# Patient Record
Sex: FEMALE | Race: BLACK OR AFRICAN AMERICAN | Employment: UNEMPLOYED | URBAN - METROPOLITAN AREA
[De-identification: names, ages, dates, MRNs, and addresses within clinical notes are randomized per-mention and may not be internally consistent; named-entity substitution may affect disease eponyms.]

---

## 2017-01-24 DIAGNOSIS — L30.9 DERMATITIS: ICD-10-CM

## 2017-01-24 DIAGNOSIS — E11.00 UNCONTROLLED TYPE 2 DIABETES MELLITUS WITH HYPEROSMOLARITY WITHOUT COMA, WITHOUT LONG-TERM CURRENT USE OF INSULIN (HCC): ICD-10-CM

## 2017-02-13 ENCOUNTER — OFFICE VISIT (OUTPATIENT)
Dept: ENDOCRINOLOGY | Age: 37
End: 2017-02-13

## 2017-02-13 VITALS
BODY MASS INDEX: 41.21 KG/M2 | TEMPERATURE: 97 F | SYSTOLIC BLOOD PRESSURE: 141 MMHG | WEIGHT: 241.4 LBS | RESPIRATION RATE: 16 BRPM | HEIGHT: 64 IN | HEART RATE: 74 BPM | DIASTOLIC BLOOD PRESSURE: 74 MMHG

## 2017-02-13 DIAGNOSIS — E66.01 MORBID OBESITY DUE TO EXCESS CALORIES (HCC): ICD-10-CM

## 2017-02-13 DIAGNOSIS — L68.0 HIRSUTISM: ICD-10-CM

## 2017-02-13 DIAGNOSIS — Z79.4 TYPE 2 DIABETES MELLITUS WITH HYPERGLYCEMIA, WITH LONG-TERM CURRENT USE OF INSULIN (HCC): Primary | ICD-10-CM

## 2017-02-13 DIAGNOSIS — E11.65 TYPE 2 DIABETES MELLITUS WITH HYPERGLYCEMIA, WITH LONG-TERM CURRENT USE OF INSULIN (HCC): Primary | ICD-10-CM

## 2017-02-13 DIAGNOSIS — I10 ESSENTIAL HYPERTENSION: Primary | ICD-10-CM

## 2017-02-13 RX ORDER — LABETALOL 100 MG/1
100 TABLET, FILM COATED ORAL 2 TIMES DAILY
Qty: 60 TAB | Refills: 5 | Status: SHIPPED | OUTPATIENT
Start: 2017-02-13 | End: 2017-05-16 | Stop reason: SDUPTHER

## 2017-02-13 NOTE — MR AVS SNAPSHOT
Visit Information Date & Time Provider Department Dept. Phone Encounter #  
 2/13/2017  1:15 PM Vaishali Patel MD [facility] [phone] [id number] Follow-up Instructions Return in about 3 months (around 5/13/2017). Upcoming Health Maintenance Date Due  
 FOOT EXAM Q1 5/25/1991 EYE EXAM RETINAL OR DILATED Q1 5/25/1991 Pneumococcal 19-64 Medium Risk (1 of 1 - PPSV23) 5/25/2000 DTaP/Tdap/Td series (1 - Tdap) 5/25/2002 PAP AKA CERVICAL CYTOLOGY 5/25/2002 INFLUENZA AGE 9 TO ADULT 8/1/2016 HEMOGLOBIN A1C Q6M 6/30/2017 MICROALBUMIN Q1 12/30/2017 LIPID PANEL Q1 12/30/2017 Allergies as of 2/13/2017  Review Complete On: 2/13/2017 By: Vaishali Patel MD  
 No Known Allergies Current Immunizations  Never Reviewed No immunizations on file. Not reviewed this visit You Were Diagnosed With   
  
 Codes Comments Type 2 diabetes mellitus with hyperglycemia, with long-term current use of insulin (HCC)    -  Primary ICD-10-CM: E11.65, Z79.4 ICD-9-CM: 250.00, 790.29, V58.67 Hirsutism     ICD-10-CM: L68.0 ICD-9-CM: 704.1 Vitals BP Pulse Temp Resp Height(growth percentile) Weight(growth percentile) 141/74 (BP 1 Location: Left arm, BP Patient Position: Sitting) 74 97 °F (36.1 °C) (Oral) 16 5' 4\" (1.626 m) 241 lb 6.4 oz (109.5 kg) LMP BMI OB Status Smoking Status 06/01/2016 41.44 kg/m2 Pregnant Former Smoker BMI and BSA Data Body Mass Index Body Surface Area  
 41.44 kg/m 2 2.22 m 2 Preferred Pharmacy Pharmacy Name Phone [facility] [address] Your Updated Medication List  
  
   
This list is accurate as of: 2/13/17  2:04 PM.  Always use your most recent med list.  
  
  
  
  
 aspirin delayed-release 81 mg tablet Take 81 mg by mouth daily. cholecalciferol 1,000 unit Cap Commonly known as:  VITAMIN D3  
 Take 1,000 Units by mouth three (3) times daily. CLARITIN 10 mg tablet Generic drug:  loratadine Take 10 mg by mouth. clotrimazole-betamethasone topical cream  
Commonly known as:  Rabia Bush Twice daily to hand  
  
 cyanocobalamin 500 mcg tablet Commonly known as:  VITAMIN B12 Take 500 mcg by mouth daily. glucose blood VI test strips strip Commonly known as:  ONETOUCH ULTRA TEST Test blood glucose 6 times daily Dx Code: O24.414  
  
 insulin detemir 100 unit/mL injection Commonly known as:  LEVEMIR  
20 units SC  at HS  
  
 insulin syringe-needle U-100 1 mL 31 gauge x 15/64\" Syrg Commonly known as:  BD INSULIN SYRINGE ULTRA-FINE  
1 Syringe by SubCUTAneous route nightly. Lancets Misc Commonly known as: One Touch Tara Sidney Test blood glucose 6 times daily Dx Code: O24.414  
  
 linagliptin 5 mg tablet Commonly known as:  Aurelia Stands Take 1 Tab by mouth daily. metFORMIN 1,000 mg tablet Commonly known as:  GLUCOPHAGE Take 1 Tab by mouth two (2) times daily (with meals). PRENATAL DHA+COMPLETE PRENATAL -300 mg-mcg-mg Cmpk Generic drug:  PNV no.24-iron-folic acid-dha Take  by mouth.  
  
 pyridoxine (vitamin B6) 100 mg tablet Commonly known as:  VITAMIN B-6 Take 100 mg by mouth daily. VITAMIN C PO Take 1,000 mg by mouth daily. Follow-up Instructions Return in about 3 months (around 5/13/2017). Introducing [facility]! [facility] introduces Codemedia patient portal. Now you can access parts of your medical record, email your doctor's office, and request medication refills online. 1. In your internet browser, go to [email or web address] 2. Click on the First Time User? Click Here link in the Sign In box. You will see the New Member Sign Up page. 3. Enter your Codemedia Access Code exactly as it appears below. You will not need to use this code after youve completed the sign-up process.  If you do not sign up before the expiration date, you must request a new code. · dVisit Access Code: [email or web address] Expires: 5/14/2017  2:02 PM 
 
4. Enter the last four digits of your Social Security Number (xxxx) and Date of Birth (mm/dd/yyyy) as indicated and click Submit. You will be taken to the next sign-up page. 5. Create a dVisit ID. This will be your dVisit login ID and cannot be changed, so think of one that is secure and easy to remember. 6. Create a dVisit password. You can change your password at any time. 7. Enter your Password Reset Question and Answer. This can be used at a later time if you forget your password. 8. Enter your e-mail address. You will receive e-mail notification when new information is available in [address] 9. Click Sign Up. You can now view and download portions of your medical record. 10. Click the Download Summary menu link to download a portable copy of your medical information. If you have questions, please visit the Frequently Asked Questions section of the dVisit website. Remember, dVisit is NOT to be used for urgent needs. For medical emergencies, dial 911. Now available from your iPhone and Android! Please provide this summary of care documentation to your next provider. If you have any questions after today's visit, please call [phone].

## 2017-02-13 NOTE — PROGRESS NOTES
[facility]               Jamey Rolle MD        [address] 78 [phone] Fax [phone]               Patient Information  Date:2/13/2017  Name : Vu Reaves 29 y.o.     YOB: 1980         Referred by: No primary care provider on file. Chief Complaint   Patient presents with    Diabetes     3 mo f/u       History of Present Illness: Vu Reaves is a 29 y.o. female here for fu of  Type 2 Diabetes Mellitus. She is off insulin   Doing well on oral DM meds  Denies missing   Checking glucose at home  Has not noticed significant hypoglycemia     Compliant with medications    No acute issues        She was diagnosed with diabetes in February 2016. She has a history of gestational diabetes with last  pregnancy requiring insulin. Wt Readings from Last 3 Encounters:   02/13/17 241 lb 6.4 oz (109.5 kg)   10/05/16 242 lb (109.8 kg)   08/23/16 244 lb (110.7 kg)       BP Readings from Last 3 Encounters:   02/13/17 141/74   10/05/16 141/84   08/23/16 139/89           Past Medical History   Diagnosis Date    Carpal tunnel syndrome     Diabetes ([facility] [address]      Current Outpatient Prescriptions   Medication Sig    linagliptin (TRADJENTA) 5 mg tablet Take 1 Tab by mouth daily.  metFORMIN (GLUCOPHAGE) 1,000 mg tablet Take 1 Tab by mouth two (2) times daily (with meals).  clotrimazole-betamethasone (LOTRISONE) topical cream Twice daily to hand    insulin syringe-needle U-100 (BD INSULIN SYRINGE ULTRA-FINE) 1 mL 31 gauge x 15/64\" syrg 1 Syringe by SubCUTAneous route nightly.  loratadine (CLARITIN) 10 mg tablet Take 10 mg by mouth.  PNV no.24-iron-folic acid-dha (PRENATAL DHA+COMPLETE PRENATAL) 30975-300 mg-mcg-mg cmpk Take  by mouth.     glucose blood VI test strips (ONETOUCH ULTRA TEST) strip Test blood glucose 6 times daily Dx Code: O24.414    Lancets (ONE TOUCH DELICA) misc Test blood glucose 6 times daily Dx Code: O24.414    cholecalciferol (VITAMIN D3) 1,000 unit cap Take 1,000 Units by mouth three (3) times daily.  pyridoxine, vitamin B6, (VITAMIN B-6) 100 mg tablet Take 100 mg by mouth daily.  cyanocobalamin (VITAMIN B12) 500 mcg tablet Take 500 mcg by mouth daily.  ASCORBATE CALCIUM (VITAMIN C PO) Take 1,000 mg by mouth daily.  insulin detemir (LEVEMIR) 100 unit/mL injection 20 units SC  at HS    aspirin delayed-release 81 mg tablet Take 81 mg by mouth daily. No current facility-administered medications for this visit. No Known Allergies      Review of Systems:  - Constitutional Symptoms: no fevers, no chills, no weight loss  - Eyes: no blurry vision no double vision  - Cardiovascular: no chest pain ,no palpitations  - Respiratory: no cough no shortness of breath  - Gastrointestinal: no dysphagia no  abdominal pain  - Psychiatric: no depression no  anxiety  - Endocrine: no heat or cold intolerance    Physical Examination:   Blood pressure 141/74, pulse 74, temperature 97 °F (36.1 °C), temperature source Oral, resp. rate 16, height 5' 4\" (1.626 m), weight 241 lb 6.4 oz (109.5 kg), last menstrual period 06/01/2016. Estimated body mass index is 41.44 kg/(m^2) as calculated from the following:    Height as of this encounter: 5' 4\" (1.626 m). -   Weight as of this encounter: 241 lb 6.4 oz (109.5 kg). General: pleasant, no distress,   HEENT: no pallor, no periorbital edema, EOMI  Neck: supple,   Cardiovascular: regular, normal rate, normal S1 and S2  Respiratory: clear to auscultation bilaterally  Neurological:alert and oriented  Psychiatric: normal mood and affect  - Skin: color, texture, turgor normal.       Data Reviewed:     [] Glucose records reviewed. [] See glucose records for details (to be scanned). [] A1C  [] Reviewed labs      Assessment/Plan:     1. Type 2 diabetes mellitus with hyperglycemia, with long-term current use of insulin ([address]    2. Hirsutism        1.  Type 2 Diabetes Mellitus with no nephropathy,neuropathy,retinopathy     Metformin   tradjenta   Off insulin   No pregnancy planned , pt to call if she plans to concieve  Discussed strict contraception     FLU annually ,Pneumovax ,aspirin daily,annual eye exam,microalbumin    2. Hirsutism - had oligomenorrhea , metformin has helped  LMP Jan 2017       4. Obesity:Body mass index is 41.44 kg/(m^2). Discussed about the importance of exercise and carbohydrate portion control. There are no Patient Instructions on file for this visit. Follow-up Disposition: Not on File    Thank you for allowing me to participate in the care of this patient.     Cristela Ambriz MD      Patient verbalized understanding

## 2017-02-13 NOTE — PROGRESS NOTES
Miguel Angel Davis is a 29 y.o. female here for   Chief Complaint   Patient presents with    Diabetes     3 mo f/u       Functional glucose monitor and record keeping system? - yes  Eye exam within last year? - yes  Foot exam within last year? - yes    Lab Results   Component Value Date/Time    Hemoglobin A1c 6.5 12/30/2016 01:51 PM    Hemoglobin A1c, External 12.6 02/03/2016       Wt Readings from Last 3 Encounters:   10/05/16 242 lb (109.8 kg)   08/23/16 244 lb (110.7 kg)   04/27/16 254 lb 6.4 oz (115.4 kg)     Temp Readings from Last 3 Encounters:   10/05/16 97.9 °F (36.6 °C) (Oral)   08/23/16 97.1 °F (36.2 °C) (Oral)   04/27/16 98.2 °F (36.8 °C) (Oral)     BP Readings from Last 3 Encounters:   10/05/16 141/84   08/23/16 139/89   04/27/16 130/81     Pulse Readings from Last 3 Encounters:   10/05/16 73   08/23/16 66   04/27/16 63

## 2017-02-13 NOTE — LETTER
NOTIFICATION RETURN TO WORK / SCHOOL 
 
2/13/2017 2:08 PM 
 
Ms. Abimbola Garcia [address] [address] To Whom It May Concern: 
 
Abimbola Garcia is currently under the care of [address] [address] [address]. She will return to work/school on: 2/14/2017. If there are questions or concerns please have the patient contact our office. Sincerely, Josi Richards MD

## 2017-05-09 ENCOUNTER — TELEPHONE (OUTPATIENT)
Dept: ENDOCRINOLOGY | Age: 37
End: 2017-05-09

## 2017-05-16 ENCOUNTER — OFFICE VISIT (OUTPATIENT)
Dept: ENDOCRINOLOGY | Age: 37
End: 2017-05-16

## 2017-05-16 VITALS
DIASTOLIC BLOOD PRESSURE: 64 MMHG | HEIGHT: 64 IN | TEMPERATURE: 97.4 F | BODY MASS INDEX: 41.21 KG/M2 | WEIGHT: 241.4 LBS | RESPIRATION RATE: 16 BRPM | HEART RATE: 76 BPM | SYSTOLIC BLOOD PRESSURE: 148 MMHG

## 2017-05-16 DIAGNOSIS — L68.0 HIRSUTISM: ICD-10-CM

## 2017-05-16 DIAGNOSIS — I10 ESSENTIAL HYPERTENSION: ICD-10-CM

## 2017-05-16 DIAGNOSIS — E11.65 TYPE 2 DIABETES MELLITUS WITH HYPERGLYCEMIA, WITHOUT LONG-TERM CURRENT USE OF INSULIN (HCC): Primary | ICD-10-CM

## 2017-05-16 RX ORDER — LABETALOL 100 MG/1
100 TABLET, FILM COATED ORAL 2 TIMES DAILY
Qty: 60 TAB | Refills: 5 | Status: SHIPPED | OUTPATIENT
Start: 2017-05-16 | End: 2017-09-27 | Stop reason: ALTCHOICE

## 2017-05-16 RX ORDER — BISMUTH SUBSALICYLATE 262 MG
1 TABLET,CHEWABLE ORAL DAILY
COMMUNITY
End: 2020-04-15

## 2017-05-16 NOTE — MR AVS SNAPSHOT
Visit Information Date & Time Provider Department Dept. Phone Encounter #  
 5/16/2017  9:00 AM Hannah Rajan MD [facility] [phone] [id number] Follow-up Instructions Return in about 4 months (around 9/16/2017). Upcoming Health Maintenance Date Due  
 FOOT EXAM Q1 5/25/1991 EYE EXAM RETINAL OR DILATED Q1 5/25/1991 Pneumococcal 19-64 Medium Risk (1 of 1 - PPSV23) 5/25/2000 DTaP/Tdap/Td series (1 - Tdap) 5/25/2002 PAP AKA CERVICAL CYTOLOGY 5/25/2002 INFLUENZA AGE 9 TO ADULT 8/1/2017 HEMOGLOBIN A1C Q6M 11/9/2017 MICROALBUMIN Q1 12/30/2017 LIPID PANEL Q1 12/30/2017 Allergies as of 5/16/2017  Review Complete On: 5/16/2017 By: Simi Huerta LPN No Known Allergies Current Immunizations  Never Reviewed No immunizations on file. Not reviewed this visit You Were Diagnosed With   
  
 Codes Comments Type 2 diabetes mellitus with hyperglycemia, without long-term current use of insulin (HCC)    -  Primary ICD-10-CM: E11.65 ICD-9-CM: 250.00, 790.29 Vitals BP Pulse Temp Resp Height(growth percentile) Weight(growth percentile) 148/64 (BP 1 Location: Right arm, BP Patient Position: Sitting) 76 97.4 °F (36.3 °C) (Oral) 16 5' 4\" (1.626 m) 241 lb 6.4 oz (109.5 kg) LMP BMI OB Status Smoking Status 06/01/2016 41.44 kg/m2 Pregnant Former Smoker Vitals History BMI and BSA Data Body Mass Index Body Surface Area  
 41.44 kg/m 2 2.22 m 2 Preferred Pharmacy Pharmacy Name Phone [facility], [address] [place] [address] Your Updated Medication List  
  
   
This list is accurate as of: 5/16/17  9:52 AM.  Always use your most recent med list.  
  
  
  
  
 cholecalciferol 1,000 unit Cap Commonly known as:  VITAMIN D3 Take 1,000 Units by mouth three (3) times daily. CLARITIN 10 mg tablet Generic drug:  loratadine Take 10 mg by mouth. clotrimazole-betamethasone topical cream  
Commonly known as:  Judye [place] Twice daily to hand  
  
 cyanocobalamin 500 mcg tablet Commonly known as:  VITAMIN B12 Take 500 mcg by mouth daily. glucose blood VI test strips strip Commonly known as:  ONETOUCH ULTRA TEST Test blood glucose 6 times daily Dx Code: O24.414  
  
 insulin syringe-needle U-100 1 mL 31 gauge x 15/64\" Syrg Commonly known as:  BD INSULIN SYRINGE ULTRA-FINE  
1 Syringe by SubCUTAneous route nightly. labetalol 100 mg tablet Commonly known as:  Scherry Francine Take 1 Tab by mouth two (2) times a day. Lancets Misc Commonly known as: One Touch Chrystine Cotta Test blood glucose 6 times daily Dx Code: O24.414  
  
 linagliptin 5 mg tablet Commonly known as:  Rebecca Forest Take 1 Tab by mouth daily. metFORMIN 1,000 mg tablet Commonly known as:  GLUCOPHAGE Take 1 Tab by mouth two (2) times daily (with meals). multivitamin tablet Commonly known as:  ONE A DAY Take 1 Tab by mouth daily. pyridoxine (vitamin B6) 100 mg tablet Commonly known as:  VITAMIN B-6 Take 100 mg by mouth daily. VITAMIN C PO Take 1,000 mg by mouth daily. Follow-up Instructions Return in about 4 months (around 9/16/2017). Introducing [facility]! Jessica Fern[facility] introduces Soapbox patient portal. Now you can access parts of your medical record, email your doctor's office, and request medication refills online. 1. In your internet browser, go to [email or web address] 2. Click on the First Time User? Click Here link in the Sign In box. You will see the New Member Sign Up page. 3. Enter your Soapbox Access Code exactly as it appears below. You will not need to use this code after youve completed the sign-up process. If you do not sign up before the expiration date, you must request a new code. · Soapbox Access Code: [email or web address] Expires: 8/14/2017  9:52 AM 
 
4. Enter the last four digits of your Social Security Number (xxxx) and Date of Birth (mm/dd/yyyy) as indicated and click Submit. You will be taken to the next sign-up page. 5. Create a Litigain ID. This will be your Litigain login ID and cannot be changed, so think of one that is secure and easy to remember. 6. Create a Litigain password. You can change your password at any time. 7. Enter your Password Reset Question and Answer. This can be used at a later time if you forget your password. 8. Enter your e-mail address. You will receive e-mail notification when new information is available in [address] 9. Click Sign Up. You can now view and download portions of your medical record. 10. Click the Download Summary menu link to download a portable copy of your medical information. If you have questions, please visit the Frequently Asked Questions section of the Litigain website. Remember, Litigain is NOT to be used for urgent needs. For medical emergencies, dial 911. Now available from your iPhone and Android! Please provide this summary of care documentation to your next provider. If you have any questions after today's visit, please call [phone].

## 2017-05-16 NOTE — PROGRESS NOTES
[facility]               Geraldo Colon MD        [address] 78 [phone] Fax [phone]               Patient Information  Date:5/16/2017  Name : Jaxson Tolliver 29 y.o.     YOB: 1980         Referred by: No primary care provider on file. Chief Complaint   Patient presents with    Diabetes       History of Present Illness: Jaxson Tolliver is a 29 y.o. female here for fu of  Type 2 Diabetes Mellitus. She is off insulin   Doing well on oral DM meds  Checking glucose at home  Has not noticed significant hypoglycemia     Compliant with medications    No acute issues                She was diagnosed with diabetes in February 2016. She has a history of gestational diabetes with last  pregnancy requiring insulin. Wt Readings from Last 3 Encounters:   05/16/17 241 lb 6.4 oz (109.5 kg)   02/13/17 241 lb 6.4 oz (109.5 kg)   10/05/16 242 lb (109.8 kg)       BP Readings from Last 3 Encounters:   05/16/17 148/64   02/13/17 141/74   10/05/16 141/84           Past Medical History:   Diagnosis Date    Carpal tunnel syndrome     Diabetes (HCC)      Current Outpatient Prescriptions   Medication Sig    multivitamin (ONE A DAY) tablet Take 1 Tab by mouth daily.  linagliptin (TRADJENTA) 5 mg tablet Take 1 Tab by mouth daily.  metFORMIN (GLUCOPHAGE) 1,000 mg tablet Take 1 Tab by mouth two (2) times daily (with meals).  clotrimazole-betamethasone (LOTRISONE) topical cream Twice daily to hand    insulin syringe-needle U-100 (BD INSULIN SYRINGE ULTRA-FINE) 1 mL 31 gauge x 15/64\" syrg 1 Syringe by SubCUTAneous route nightly.  loratadine (CLARITIN) 10 mg tablet Take 10 mg by mouth.     glucose blood VI test strips (ONETOUCH ULTRA TEST) strip Test blood glucose 6 times daily Dx Code: O24.414    Lancets (ONE TOUCH DELICA) misc Test blood glucose 6 times daily Dx Code: O24.414    cholecalciferol (VITAMIN D3) 1,000 unit cap Take 1,000 Units by mouth three (3) times daily.  pyridoxine, vitamin B6, (VITAMIN B-6) 100 mg tablet Take 100 mg by mouth daily.  cyanocobalamin (VITAMIN B12) 500 mcg tablet Take 500 mcg by mouth daily.  ASCORBATE CALCIUM (VITAMIN C PO) Take 1,000 mg by mouth daily.  labetalol (NORMODYNE) 100 mg tablet Take 1 Tab by mouth two (2) times a day.  insulin detemir (LEVEMIR) 100 unit/mL injection 20 units SC  at HS     No current facility-administered medications for this visit. No Known Allergies      Review of Systems:  - Constitutional Symptoms: no fevers, no chills, no weight loss  - Eyes: no blurry vision no double vision  - Cardiovascular: no chest pain ,no palpitations  - Respiratory: no cough no shortness of breath  - Gastrointestinal: no dysphagia no  abdominal pain  - Psychiatric: no depression no  anxiety  - Endocrine: no heat or cold intolerance    Physical Examination:   Blood pressure 148/64, pulse 76, temperature 97.4 °F (36.3 °C), temperature source Oral, resp. rate 16, height 5' 4\" (1.626 m), weight 241 lb 6.4 oz (109.5 kg), last menstrual period 06/01/2016. Estimated body mass index is 41.44 kg/(m^2) as calculated from the following:    Height as of this encounter: 5' 4\" (1.626 m). -   Weight as of this encounter: 241 lb 6.4 oz (109.5 kg). General: pleasant, no distress,   HEENT: no pallor, no periorbital edema, EOMI  Neck: supple,   Cardiovascular: regular, normal rate, normal S1 and S2  Respiratory: clear to auscultation bilaterally  Neurological:alert and oriented  Psychiatric: normal mood and affect  - Skin: color, texture, turgor normal.       Data Reviewed:     [] Glucose records reviewed. [] See glucose records for details (to be scanned). [] A1C  [] Reviewed labs      Assessment/Plan:     1. Type 2 diabetes mellitus with hyperglycemia, without long-term current use of insulin (HCC)        1.  Type 2 Diabetes Mellitus with no nephropathy,neuropathy,retinopathy  Lab Results Component Value Date/Time    Hemoglobin A1c 6.3 05/09/2017 10:05 AM    Hemoglobin A1c, External 12.6 02/03/2016        Metformin   tradjenta   Off insulin   No pregnancy planned , pt to call if she plans to concieve  Discussed strict contraception     FLU annually ,Pneumovax ,aspirin daily,annual eye exam,microalbumin    2. Hirsutism - had oligomenorrhea , metformin has helped _PCOS  LMP Jan 2017   Rule out excess cortisol    3 HTN - no lisinopril, restart labetolol    4. Obesity:Body mass index is 41.44 kg/(m^2). Discussed about the importance of exercise and carbohydrate portion control. There are no Patient Instructions on file for this visit. Follow-up Disposition: Not on File    Thank you for allowing me to participate in the care of this patient.     Joni Mckeon MD      Patient verbalized understanding

## 2017-05-16 NOTE — PROGRESS NOTES
Catalina Goff is a 29 y.o. female here for   Chief Complaint   Patient presents with    Diabetes       Functional glucose monitor and record keeping system? - no  Eye exam within last year? - yes Feb 2017  Foot exam within last year? - yes, last visit    Lab Results   Component Value Date/Time    Hemoglobin A1c 6.3 05/09/2017 10:05 AM    Hemoglobin A1c, External 12.6 02/03/2016       Wt Readings from Last 3 Encounters:   02/13/17 241 lb 6.4 oz (109.5 kg)   10/05/16 242 lb (109.8 kg)   08/23/16 244 lb (110.7 kg)     Temp Readings from Last 3 Encounters:   02/13/17 97 °F (36.1 °C) (Oral)   10/05/16 97.9 °F (36.6 °C) (Oral)   08/23/16 97.1 °F (36.2 °C) (Oral)     BP Readings from Last 3 Encounters:   02/13/17 141/74   10/05/16 141/84   08/23/16 139/89     Pulse Readings from Last 3 Encounters:   02/13/17 74   10/05/16 73   08/23/16 66

## 2017-06-19 ENCOUNTER — TELEPHONE (OUTPATIENT)
Dept: ENDOCRINOLOGY | Age: 37
End: 2017-06-19

## 2017-06-19 NOTE — TELEPHONE ENCOUNTER
Pt states she is currently pregnant and wants to know the dose of Levemir, if she needs to take per physician.

## 2017-06-19 NOTE — TELEPHONE ENCOUNTER
She has an appointment tomorrow    Based on the log insulin will be added and discussed during visit     Ask her to check sugars before breakfast and 2 hours after each meal     Stop Tradjenta     Continue Metformin

## 2017-06-20 ENCOUNTER — OFFICE VISIT (OUTPATIENT)
Dept: ENDOCRINOLOGY | Age: 37
End: 2017-06-20

## 2017-06-20 VITALS
SYSTOLIC BLOOD PRESSURE: 160 MMHG | OXYGEN SATURATION: 100 % | BODY MASS INDEX: 40.8 KG/M2 | TEMPERATURE: 98.2 F | DIASTOLIC BLOOD PRESSURE: 83 MMHG | HEIGHT: 64 IN | WEIGHT: 239 LBS | HEART RATE: 84 BPM | RESPIRATION RATE: 16 BRPM

## 2017-06-20 DIAGNOSIS — I10 ESSENTIAL HYPERTENSION: ICD-10-CM

## 2017-06-20 DIAGNOSIS — E66.01 MORBID OBESITY DUE TO EXCESS CALORIES (HCC): ICD-10-CM

## 2017-06-20 DIAGNOSIS — E11.65 TYPE 2 DIABETES MELLITUS WITH HYPERGLYCEMIA, UNSPECIFIED LONG TERM INSULIN USE STATUS: Primary | ICD-10-CM

## 2017-06-20 DIAGNOSIS — Z79.4 UNCONTROLLED TYPE 2 DIABETES MELLITUS WITH HYPERGLYCEMIA, WITH LONG-TERM CURRENT USE OF INSULIN (HCC): ICD-10-CM

## 2017-06-20 DIAGNOSIS — O24.414 INSULIN CONTROLLED GESTATIONAL DIABETES MELLITUS (GDM) IN FIRST TRIMESTER: ICD-10-CM

## 2017-06-20 DIAGNOSIS — E11.65 UNCONTROLLED TYPE 2 DIABETES MELLITUS WITH HYPERGLYCEMIA, WITH LONG-TERM CURRENT USE OF INSULIN (HCC): ICD-10-CM

## 2017-06-20 DIAGNOSIS — L68.0 HIRSUTISM: ICD-10-CM

## 2017-06-20 DIAGNOSIS — O24.019 PRE-EXISTING TYPE 1 DIABETES MELLITUS DURING PREGNANCY, ANTEPARTUM: ICD-10-CM

## 2017-06-20 RX ORDER — METHYLDOPA 250 MG/1
250 TABLET, FILM COATED ORAL 3 TIMES DAILY
Qty: 90 TAB | Refills: 2 | Status: SHIPPED | OUTPATIENT
Start: 2017-06-20 | End: 2017-06-30 | Stop reason: SDUPTHER

## 2017-06-20 RX ORDER — METFORMIN HYDROCHLORIDE 1000 MG/1
TABLET ORAL
Qty: 60 TAB | Refills: 6 | Status: SHIPPED | OUTPATIENT
Start: 2017-06-20 | End: 2018-01-15 | Stop reason: SDUPTHER

## 2017-06-20 RX ORDER — PEN NEEDLE, DIABETIC 31 GX3/16"
NEEDLE, DISPOSABLE MISCELLANEOUS
Qty: 100 PEN NEEDLE | Refills: 4 | Status: SHIPPED | OUTPATIENT
Start: 2017-06-20

## 2017-06-20 NOTE — PROGRESS NOTES
[facility]               Sandie Motta MD        [address] 78 [phone] Fax [phone]               Patient Information  Date:6/21/2017  Name : Rodrick Brink 40 y.o.     YOB: 1980         Referred by: No primary care provider on file. Chief Complaint   Patient presents with    Diabetes       History of Present Illness: Rodrick Brink is a 40 y.o. female here for fu of  Type 2 Diabetes Mellitus. She was off insulin, doing well on oral medications. Now she is 4 weeks pregnant. Asked her to stop Tradjenta, continue Metformin. She had Levemir which she had from last year, this was not refrigerated and she took 20 units this morning. She is checking blood glucose, fasting's are 120. No hypoglycemia. Checking glucose at home  Has not noticed significant hypoglycemia     Compliant with medications    No acute issues                She was diagnosed with diabetes in February 2016. She has a history of gestational diabetes with last  pregnancy requiring insulin. Wt Readings from Last 3 Encounters:   06/20/17 239 lb (108.4 kg)   05/16/17 241 lb 6.4 oz (109.5 kg)   02/13/17 241 lb 6.4 oz (109.5 kg)       BP Readings from Last 3 Encounters:   06/20/17 160/83   05/16/17 148/64   02/13/17 141/74           Past Medical History:   Diagnosis Date    Carpal tunnel syndrome     Diabetes (HCC)      Current Outpatient Prescriptions   Medication Sig    insulin NPH (HUMULIN N) 100 unit/mL (3 mL) inpn 25 units at bedtime SC    Insulin Needles, Disposable, (JOSE ALBERTO PEN NEEDLE) 32 gauge x 5/32\" ndle With insulin    methyldopa (ALDOMET) 250 mg tablet Take 1 Tab by mouth three (3) times daily.  multivitamin (ONE A DAY) tablet Take 1 Tab by mouth daily.  labetalol (NORMODYNE) 100 mg tablet Take 1 Tab by mouth two (2) times a day.     insulin syringe-needle U-100 (BD INSULIN SYRINGE ULTRA-FINE) 1 mL 31 gauge x 15/64\" syrg 1 Syringe by SubCUTAneous route nightly.  loratadine (CLARITIN) 10 mg tablet Take 10 mg by mouth.  glucose blood VI test strips (ONETOUCH ULTRA TEST) strip Test blood glucose 6 times daily Dx Code: O24.414    Lancets (ONE TOUCH DELICA) misc Test blood glucose 6 times daily Dx Code: O24.414    cholecalciferol (VITAMIN D3) 1,000 unit cap Take 1,000 Units by mouth three (3) times daily.  cyanocobalamin (VITAMIN B12) 500 mcg tablet Take 500 mcg by mouth daily.  metFORMIN (GLUCOPHAGE) 1,000 mg tablet TAKE ONE TABLET BY MOUTH TWICE DAILY WITH MEALS    clotrimazole-betamethasone (LOTRISONE) topical cream Twice daily to hand    pyridoxine, vitamin B6, (VITAMIN B-6) 100 mg tablet Take 100 mg by mouth daily.  ASCORBATE CALCIUM (VITAMIN C PO) Take 1,000 mg by mouth daily. No current facility-administered medications for this visit. No Known Allergies      Review of Systems:  - Constitutional Symptoms: no fevers, no chills, no weight loss  - Eyes: no blurry vision no double vision  - Cardiovascular: no chest pain ,no palpitations  - Respiratory: no cough no shortness of breath  - Gastrointestinal: no dysphagia no  abdominal pain  - Psychiatric: no depression no  anxiety  - Endocrine: no heat or cold intolerance    Physical Examination:   Blood pressure 160/83, pulse 84, temperature 98.2 °F (36.8 °C), temperature source Oral, resp. rate 16, height 5' 4\" (1.626 m), weight 239 lb (108.4 kg), last menstrual period 05/17/2016, SpO2 100 %. Estimated body mass index is 41.02 kg/(m^2) as calculated from the following:    Height as of this encounter: 5' 4\" (1.626 m). -   Weight as of this encounter: 239 lb (108.4 kg).   General: pleasant, no distress,   HEENT: no pallor, no periorbital edema, EOMI  Neck: supple,   Cardiovascular: regular, normal rate, normal S1 and S2  Respiratory: clear to auscultation bilaterally  Neurological:alert and oriented  Psychiatric: normal mood and affect  - Skin: color, texture, turgor normal.       Data Reviewed:     [] Glucose records reviewed. [] See glucose records for details (to be scanned). [] A1C  [] Reviewed labs      Assessment/Plan:     1. Type 2 diabetes mellitus with hyperglycemia, unspecified long term insulin use status    2. Pre-existing type 1 diabetes mellitus during pregnancy, antepartum    3. Essential hypertension    4. Morbid obesity due to excess calories ([address]        1. Type 2 Diabetes Mellitus with no nephropathy,neuropathy,retinopathy  Lab Results   Component Value Date/Time    Hemoglobin A1c 6.3 05/09/2017 10:05 AM    Hemoglobin A1c, External 12.6 02/03/2016     She is now pregnant, discussed the goals of blood glucose, complications of uncontrolled diabetes on pregnancy outcomes. Check blood glucose fasting and two hours after. NPH 25 units at bedtime. Continue Metformin. Has insulin resistance. She has stopped Tradjenta. Blood pressure is still high. She is on Labetalol, start Methyldopa. Follow up with OB. She has to see high risk OB.      FLU annually ,Pneumovax ,aspirin daily,annual eye exam,microalbumin    2. Hirsutism - had oligomenorrhea , metformin has helped _PCOS  LMP Jan 2017       3 HTN - no lisinopril, restart labetolol    4. Obesity:Body mass index is 41.02 kg/(m^2). Discussed about the importance of exercise and carbohydrate portion control. Need close monitoring       Patient Instructions   Goals for blood sugar:  - fasting: less than 90 - 95  - 1 hour after a meal : less than 130  - 2 hours after a meal: less than 120    Check blood sugars immediately before breakfast  , 2 hour after the meals     Levemir  insulin 25 units at bed time            Follow-up Disposition:  Return in about 1 week (around 6/27/2017). Thank you for allowing me to participate in the care of this patient.     Maged Blackman MD      Patient verbalized understanding

## 2017-06-20 NOTE — LETTER
NOTIFICATION RETURN TO WORK / SCHOOL 
 
6/20/2017 4:59 PM 
 
Ms. Suzanne Allen [address] [facility] [address] [address] [address] [address] To Whom It May Concern: 
 
Suzanne Allen is currently under the care of [address] [address]. She will return to work/school on: 6/20/17 If there are questions or concerns please have the patient contact our office. Sincerely, Sandra Hidalgo MD

## 2017-06-20 NOTE — PROGRESS NOTES
Eye exam: 2 months ago  Foot exam: within last year    Lab Results   Component Value Date/Time    Hemoglobin A1c 6.3 05/09/2017 10:05 AM    Hemoglobin A1c, External 12.6 02/03/2016     Wt Readings from Last 3 Encounters:   06/20/17 239 lb (108.4 kg)   05/16/17 241 lb 6.4 oz (109.5 kg)   02/13/17 241 lb 6.4 oz (109.5 kg)     Temp Readings from Last 3 Encounters:   06/20/17 98.2 °F (36.8 °C) (Oral)   05/16/17 97.4 °F (36.3 °C) (Oral)   02/13/17 97 °F (36.1 °C) (Oral)     BP Readings from Last 3 Encounters:   06/20/17 160/83   05/16/17 148/64   02/13/17 141/74     Pulse Readings from Last 3 Encounters:   06/20/17 84   05/16/17 76   02/13/17 74

## 2017-06-20 NOTE — MR AVS SNAPSHOT
Visit Information Date & Time Provider Department Dept. Phone Encounter #  
 6/20/2017  3:45 PM Chikis Castaneda MD [facility] [phone] [id number] Follow-up Instructions Return in about 1 week (around 6/27/2017). Your Appointments 9/18/2017  8:30 AM  
ROUTINE CARE with Chikis Castaneda MD  
[facility]) Appt Note: f/u 4 month  
 [address] [facility] [address]  
[phone]  
  
   
 [address] [place] [address] Upcoming Health Maintenance Date Due  
 FOOT EXAM Q1 5/25/1991 EYE EXAM RETINAL OR DILATED Q1 5/25/1991 Pneumococcal 19-64 Medium Risk (1 of 1 - PPSV23) 5/25/2000 DTaP/Tdap/Td series (1 - Tdap) 5/25/2002 PAP AKA CERVICAL CYTOLOGY 5/25/2002 INFLUENZA AGE 9 TO ADULT 8/1/2017 HEMOGLOBIN A1C Q6M 11/9/2017 MICROALBUMIN Q1 12/30/2017 LIPID PANEL Q1 12/30/2017 Allergies as of 6/20/2017  Review Complete On: 6/20/2017 By: Boneta Shock, LPN No Known Allergies Current Immunizations  Never Reviewed No immunizations on file. Not reviewed this visit Vitals BP Pulse Temp Resp Height(growth percentile) Weight(growth percentile) 160/83 (BP 1 Location: Left arm, BP Patient Position: Sitting) 84 98.2 °F (36.8 °C) (Oral) 16 5' 4\" (1.626 m) 239 lb (108.4 kg) LMP SpO2 BMI OB Status Smoking Status 05/17/2016 100% 41.02 kg/m2 Pregnant Former Smoker Vitals History BMI and BSA Data Body Mass Index Body Surface Area 41.02 kg/m 2 2.21 m 2 Preferred Pharmacy Pharmacy Name Phone [facility], [address] [place] [address] Your Updated Medication List  
  
   
This list is accurate as of: 6/20/17  4:52 PM.  Always use your most recent med list.  
  
  
  
  
 cholecalciferol 1,000 unit Cap Commonly known as:  VITAMIN D3  
 Take 1,000 Units by mouth three (3) times daily. CLARITIN 10 mg tablet Generic drug:  loratadine Take 10 mg by mouth. clotrimazole-betamethasone topical cream  
Commonly known as:  Duncan Ort Twice daily to hand  
  
 cyanocobalamin 500 mcg tablet Commonly known as:  VITAMIN B12 Take 500 mcg by mouth daily. glucose blood VI test strips strip Commonly known as:  ONETOUCH ULTRA TEST Test blood glucose 6 times daily Dx Code: O24.414  
  
 insulin syringe-needle U-100 1 mL 31 gauge x 15/64\" Syrg Commonly known as:  BD INSULIN SYRINGE ULTRA-FINE  
1 Syringe by SubCUTAneous route nightly. labetalol 100 mg tablet Commonly known as:  [place] Take 1 Tab by mouth two (2) times a day. Lancets Misc Commonly known as: One Touch [facility] Test blood glucose 6 times daily Dx Code: W27.070 LEVEMIR FLEXTOUCH 100 unit/mL (3 mL) Inpn Generic drug:  insulin detemir 20 Units by SubCUTAneous route daily. metFORMIN 1,000 mg tablet Commonly known as:  GLUCOPHAGE Take 1 Tab by mouth two (2) times daily (with meals). multivitamin tablet Commonly known as:  ONE A DAY Take 1 Tab by mouth daily. pyridoxine (vitamin B6) 100 mg tablet Commonly known as:  VITAMIN B-6 Take 100 mg by mouth daily. VITAMIN C PO Take 1,000 mg by mouth daily. Follow-up Instructions Return in about 1 week (around 6/27/2017). Patient Instructions Goals for blood sugar: 
- fasting: less than 90 - 95 
- 1 hour after a meal : less than 130 
- 2 hours after a meal: less than 120 Check blood sugars immediately before breakfast  , 2 hour after the meals Levemir  insulin 25 units at bed time Introducing [facility]! Valente Hernandez introduces [employer] patient portal. Now you can access parts of your medical record, email your doctor's office, and request medication refills online.    
 
1. In your internet browser, go to [email or web address] 2. Click on the First Time User? Click Here link in the Sign In box. You will see the New Member Sign Up page. 3. Enter your F[employer] Access Code exactly as it appears below. You will not need to use this code after youve completed the sign-up process. If you do not sign up before the expiration date, you must request a new code. · [employer] Access Code: [email or web address] Expires: 8/14/2017  9:52 AM 
 
4. Enter the last four digits of your Social Security Number (xxxx) and Date of Birth (mm/dd/yyyy) as indicated and click Submit. You will be taken to the next sign-up page. 5. Create a FolderBoy ID. This will be your F[employer]Boy login ID and cannot be changed, so think of one that is secure and easy to remember. 6. Create a [employer] password. You can change your password at any time. 7. Enter your Password Reset Question and Answer. This can be used at a later time if you forget your password. 8. Enter your e-mail address. You will receive e-mail notification when new information is available in [address] 9. Click Sign Up. You can now view and download portions of your medical record. 10. Click the Download Summary menu link to download a portable copy of your medical information. If you have questions, please visit the Frequently Asked Questions section of the [employer] website. Remember, [employer] is NOT to be used for urgent needs. For medical emergencies, dial 911. Now available from your iPhone and Android! Please provide this summary of care documentation to your next provider. If you have any questions after today's visit, please call [phone].

## 2017-06-21 RX ORDER — LANCETS
EACH MISCELLANEOUS
Qty: 200 EACH | Refills: 11 | Status: SHIPPED | OUTPATIENT
Start: 2017-06-21 | End: 2019-12-05 | Stop reason: SDUPTHER

## 2017-06-22 DIAGNOSIS — E11.65 TYPE 2 DIABETES MELLITUS WITH HYPERGLYCEMIA, WITH LONG-TERM CURRENT USE OF INSULIN (HCC): Primary | ICD-10-CM

## 2017-06-22 DIAGNOSIS — Z79.4 TYPE 2 DIABETES MELLITUS WITH HYPERGLYCEMIA, WITH LONG-TERM CURRENT USE OF INSULIN (HCC): Primary | ICD-10-CM

## 2017-06-22 NOTE — TELEPHONE ENCOUNTER
Informed pt that Dr Juanita Calderon is changing her insulin to Novolin N because Levemir and Humulin N are not covered. Asked pt to take 25 units at bedtime. Pt verbalized understanding.

## 2017-06-30 ENCOUNTER — OFFICE VISIT (OUTPATIENT)
Dept: ENDOCRINOLOGY | Age: 37
End: 2017-06-30

## 2017-06-30 VITALS
WEIGHT: 240 LBS | DIASTOLIC BLOOD PRESSURE: 69 MMHG | SYSTOLIC BLOOD PRESSURE: 150 MMHG | RESPIRATION RATE: 16 BRPM | HEART RATE: 70 BPM | TEMPERATURE: 98.3 F | OXYGEN SATURATION: 97 % | HEIGHT: 64 IN | BODY MASS INDEX: 40.97 KG/M2

## 2017-06-30 DIAGNOSIS — Z79.4 TYPE 2 DIABETES MELLITUS WITH HYPERGLYCEMIA, WITH LONG-TERM CURRENT USE OF INSULIN (HCC): Primary | ICD-10-CM

## 2017-06-30 DIAGNOSIS — E11.65 TYPE 2 DIABETES MELLITUS WITH HYPERGLYCEMIA, WITH LONG-TERM CURRENT USE OF INSULIN (HCC): Primary | ICD-10-CM

## 2017-06-30 DIAGNOSIS — O24.311 PRE-EXISTING DIABETES MELLITUS DURING PREGNANCY IN FIRST TRIMESTER: ICD-10-CM

## 2017-06-30 DIAGNOSIS — O24.311 PRE-EXISTING DIABETES MELLITUS IN PREGNANCY IN FIRST TRIMESTER: ICD-10-CM

## 2017-06-30 DIAGNOSIS — I10 ESSENTIAL HYPERTENSION: ICD-10-CM

## 2017-06-30 RX ORDER — INSULIN LISPRO 100 [IU]/ML
INJECTION, SOLUTION INTRAVENOUS; SUBCUTANEOUS
Qty: 30 ML | Refills: 5 | Status: SHIPPED | OUTPATIENT
Start: 2017-06-30 | End: 2017-06-30 | Stop reason: ALTCHOICE

## 2017-06-30 RX ORDER — INSULIN ASPART 100 [IU]/ML
INJECTION, SOLUTION INTRAVENOUS; SUBCUTANEOUS
Qty: 30 ML | Refills: 5 | Status: SHIPPED | OUTPATIENT
Start: 2017-06-30 | End: 2017-08-30 | Stop reason: SDUPTHER

## 2017-06-30 RX ORDER — METHYLDOPA 500 MG/1
500 TABLET, FILM COATED ORAL 3 TIMES DAILY
Qty: 90 TAB | Refills: 2 | Status: SHIPPED | OUTPATIENT
Start: 2017-06-30 | End: 2017-08-30 | Stop reason: SDUPTHER

## 2017-06-30 NOTE — PROGRESS NOTES
[facility]               Nick Smith MD        [address] [phone] Fax [phone]               Patient Information  Date:6/30/2017  Name : Perla Swan 40 y.o.     YOB: 1980           Chief Complaint   Patient presents with    Diabetes       History of Present Illness: Perla Swan is a 40 y.o. female here for fu of  Type 2 Diabetes Mellitus. She is 8 weeks pregnant   She is on NPH at night   fastings have improved and post prandial is high depending on what she eats      Checking glucose at home  Has not noticed significant hypoglycemia       She was diagnosed with diabetes in February 2016. She has a history of gestational diabetes with last  pregnancy requiring insulin. Wt Readings from Last 3 Encounters:   06/30/17 240 lb (108.9 kg)   06/20/17 239 lb (108.4 kg)   05/16/17 241 lb 6.4 oz (109.5 kg)       BP Readings from Last 3 Encounters:   06/30/17 150/69   06/20/17 160/83   05/16/17 148/64           Past Medical History:   Diagnosis Date    Carpal tunnel syndrome     Diabetes ([facility]      Current Outpatient Prescriptions   Medication Sig    methyldopa (ALDOMET) 500 mg tablet Take 1 Tab by mouth three (3) times daily.  insulin NPH (NOVOLIN N) 100 unit/mL injection Inject 25 units q HS Stop Humulin N & Levemir    glucose blood VI test strips (ONETOUCH ULTRA TEST) strip Test blood glucose 6 times daily Dx Code: O24.414    Lancets misc Test blood glucose 6 times daily Dx Code: O24.414    metFORMIN (GLUCOPHAGE) 1,000 mg tablet TAKE ONE TABLET BY MOUTH TWICE DAILY WITH MEALS    Insulin Needles, Disposable, (JOSE ALBERTO PEN NEEDLE) 32 gauge x 5/32\" ndle With insulin    multivitamin (ONE A DAY) tablet Take 1 Tab by mouth daily.  labetalol (NORMODYNE) 100 mg tablet Take 1 Tab by mouth two (2) times a day.     clotrimazole-betamethasone (LOTRISONE) topical cream Twice daily to hand    insulin syringe-needle U-100 (BD INSULIN SYRINGE ULTRA-FINE) 1 mL 31 gauge x 15/64\" syrg 1 Syringe by SubCUTAneous route nightly.  loratadine (CLARITIN) 10 mg tablet Take 10 mg by mouth.  cholecalciferol (VITAMIN D3) 1,000 unit cap Take 1,000 Units by mouth three (3) times daily.  pyridoxine, vitamin B6, (VITAMIN B-6) 100 mg tablet Take 100 mg by mouth daily.  cyanocobalamin (VITAMIN B12) 500 mcg tablet Take 500 mcg by mouth daily.  ASCORBATE CALCIUM (VITAMIN C PO) Take 1,000 mg by mouth daily.  insulin lispro (HUMALOG KWIKPEN) 100 unit/mL kwikpen Inject 8 units before each meal with SSI. Max units daily: 51     No current facility-administered medications for this visit. No Known Allergies      Review of Systems:  - Constitutional Symptoms: no fevers, no chills, no weight loss  - Eyes: no blurry vision no double vision  - Cardiovascular: no chest pain ,no palpitations  - Respiratory: no cough no shortness of breath  - Gastrointestinal: no dysphagia no  abdominal pain  - Psychiatric: no depression no  anxiety  - Endocrine: no heat or cold intolerance    Physical Examination:   Blood pressure 150/69, pulse 70, temperature 98.3 °F (36.8 °C), temperature source Oral, resp. rate 16, height 5' 4\" (1.626 m), weight 240 lb (108.9 kg), last menstrual period 05/17/2016, SpO2 97 %. Estimated body mass index is 41.2 kg/(m^2) as calculated from the following:    Height as of this encounter: 5' 4\" (1.626 m). -   Weight as of this encounter: 240 lb (108.9 kg). General: pleasant, no distress,   HEENT: no pallor, no periorbital edema, EOMI  Neck: supple,   Cardiovascular: regular, normal rate, normal S1 and S2  Respiratory: clear to auscultation bilaterally  Neurological:alert and oriented  Psychiatric: normal mood and affect  - Skin: color, texture, turgor normal.       Data Reviewed:     [] Glucose records reviewed. [] See glucose records for details (to be scanned). [] A1C  [] Reviewed labs      Assessment/Plan:     1.  Type 2 diabetes mellitus with hyperglycemia, with long-term current use of insulin ([address]    2. Essential hypertension    3. Pre-existing diabetes mellitus during pregnancy in first trimester        1. Type 2 Diabetes Mellitus with no nephropathy,neuropathy,retinopathy  Lab Results   Component Value Date/Time    Hemoglobin A1c 6.3 05/09/2017 10:05 AM    Hemoglobin A1c, External 12.6 02/03/2016     Discussed the goals of blood glucose, complications of uncontrolled diabetes on pregnancy outcomes. Check blood glucose fasting and two hours after. NPH 25 units at bedtime. Continue Metformin. Has insulin resistance. Humalog before meals     Blood pressure is still high. She is on Labetalol, increased  Methyldopa. Follow up with OB. She has to see high risk OB.      FLU annually ,Pneumovax ,aspirin daily,annual eye exam,microalbumin    2. Hirsutism - had oligomenorrhea , metformin has helped _PCOS  LMP Jan 2017       3 HTN -    4. Obesity:Body mass index is 41.2 kg/(m^2). Discussed about the importance of exercise and carbohydrate portion control. Need close monitoring       Patient Instructions   Goals for blood sugar:  - fasting: less than 90 - 95  - 1 hour after a meal : less than 130  - 2 hours after a meal: less than 120    Check blood sugars immediately before breakfast  , 2 hour after the meals     Novolin N  ( cloudy )  insulin 28 units at bed time    Humalog 8 units before each meal       Additional Humalog for high sugars    Blood sugar  Fast acting insulin Breakfast/Lunch/Dinner        120-150  Add 3 units       150-200  Add 6 Units       201-250  Add 9 Units                   Follow-up Disposition:  Return in about 4 weeks (around 7/28/2017). Thank you for allowing me to participate in the care of this patient.     Zac Neville MD      Patient verbalized understanding

## 2017-06-30 NOTE — MR AVS SNAPSHOT
Visit Information Date & Time Provider Department Dept. Phone Encounter #  
 6/30/2017  9:15 AM Hannah Rajan MD [facility] [phone] [id number] Follow-up Instructions Return in about 4 weeks (around 7/28/2017). Your Appointments 9/18/2017  8:30 AM  
ROUTINE CARE with Hannah Rajan MD  
[facility] Appt Note: f/u 4 month  
 [address] [facility] [address] [place] [address] [facility] [address]  
[phone]  
  
   
 [address] [place] [address] [address] [facility] [address] Upcoming Health Maintenance Date Due  
 FOOT EXAM Q1 5/25/1991 EYE EXAM RETINAL OR DILATED Q1 5/25/1991 Pneumococcal 19-64 Medium Risk (1 of 1 - PPSV23) 5/25/2000 DTaP/Tdap/Td series (1 - Tdap) 5/25/2002 PAP AKA CERVICAL CYTOLOGY 5/25/2002 INFLUENZA AGE 9 TO ADULT 8/1/2017 HEMOGLOBIN A1C Q6M 11/9/2017 MICROALBUMIN Q1 12/30/2017 LIPID PANEL Q1 12/30/2017 Allergies as of 6/30/2017  Review Complete On: 6/30/2017 By: Hannah Rajan MD  
 No Known Allergies Current Immunizations  Never Reviewed No immunizations on file. Not reviewed this visit You Were Diagnosed With   
  
 Codes Comments Type 2 diabetes mellitus with hyperglycemia, with long-term current use of insulin ([facility])    -  Primary ICD-10-CM: E11.65, Z79.4 ICD-9-CM: 250.00, 790.29, V58.67 Type 2 diabetes mellitus with hyperglycemia, unspecified long term insulin use status     ICD-10-CM: E11.65 ICD-9-CM: 250.00 Pre-existing type 1 diabetes mellitus during pregnancy, antepartum     ICD-10-CM: O24.019 
ICD-9-CM: 648.03, 250.01 Essential hypertension     ICD-10-CM: I10 
ICD-9-CM: 401.9 Vitals BP Pulse Temp Resp Height(growth percentile) Weight(growth percentile) 150/69 (BP 1 Location: Right arm, BP Patient Position: Sitting) 70 98.3 °F (36.8 °C) (Oral) 16 5' 4\" (1.626 m) 240 lb (108.9 kg) LMP SpO2 BMI OB Status Smoking Status 05/17/2016 97% 41.2 kg/m2 Pregnant Former Smoker Vitals History BMI and BSA Data Body Mass Index Body Surface Area  
 41.2 kg/m 2 2.22 m 2 Preferred Pharmacy Pharmacy Name Phone [facility] Geeta Boyle Your Updated Medication List  
  
   
This list is accurate as of: 6/30/17 10:00 AM.  Always use your most recent med list.  
  
  
  
  
 cholecalciferol 1,000 unit Cap Commonly known as:  VITAMIN D3 Take 1,000 Units by mouth three (3) times daily. CLARITIN 10 mg tablet Generic drug:  loratadine Take 10 mg by mouth. clotrimazole-betamethasone topical cream  
Commonly known as:  Brinad Fraction Twice daily to hand  
  
 cyanocobalamin 500 mcg tablet Commonly known as:  VITAMIN B12 Take 500 mcg by mouth daily. glucose blood VI test strips strip Commonly known as:  ONETOUCH ULTRA TEST Test blood glucose 6 times daily Dx Code: H56.843 Insulin Needles (Disposable) 32 gauge x 5/32\" Ndle Commonly known as:  Andree Pen Needle With insulin  
  
 insulin  unit/mL injection Commonly known as:  NovoLIN N Inject 25 units q HS Stop Humulin N & Levemir  
  
 insulin syringe-needle U-100 1 mL 31 gauge x 15/64\" Syrg Commonly known as:  BD INSULIN SYRINGE ULTRA-FINE  
1 Syringe by SubCUTAneous route nightly. labetalol 100 mg tablet Commonly known as:  Lubertha Ramsay Take 1 Tab by mouth two (2) times a day. Lancets Misc Test blood glucose 6 times daily Dx Code: O24.414  
  
 metFORMIN 1,000 mg tablet Commonly known as:  GLUCOPHAGE  
TAKE ONE TABLET BY MOUTH TWICE DAILY WITH MEALS  
  
 methyldopa 500 mg tablet Commonly known as:  ALDOMET Take 1 Tab by mouth three (3) times daily. multivitamin tablet Commonly known as:  ONE A DAY Take 1 Tab by mouth daily. pyridoxine (vitamin B6) 100 mg tablet Commonly known as:  VITAMIN B-6 Take 100 mg by mouth daily. VITAMIN C PO Take 1,000 mg by mouth daily. Prescriptions Sent to Pharmacy Refills  
 methyldopa (ALDOMET) 500 mg tablet 2 Sig: Take 1 Tab by mouth three (3) times daily. Class: Normal  
 Pharmacy: [facility] #: [phone] Route: Oral  
  
Follow-up Instructions Return in about 4 weeks (around 7/28/2017). Patient Instructions Goals for blood sugar: 
- fasting: less than 90 - 95 
- 1 hour after a meal : less than 130 
- 2 hours after a meal: less than 120 Check blood sugars immediately before breakfast  , 2 hour after the meals Novolin N  ( cloudy )  insulin 28 units at bed time Humalog 8 units before each meal  
 
 
Additional Humalog for high sugars Blood sugar  Fast acting insulin Breakfast/Lunch/Dinner 120-150  Add 3 units 150-200  Add 6 Units 201-250  Add 9 Units Introducing [facility] [place] & HEALTH SERVICES! [facility] introduces [employer] patient portal. Now you can access parts of your medical record, email your doctor's office, and request medication refills online. 1. In your internet browser, go to [email or web address] 2. Click on the First Time User? Click Here link in the Sign In box. You will see the New Member Sign Up page. 3. Enter your [employer] Access Code exactly as it appears below. You will not need to use this code after youve completed the sign-up process. If you do not sign up before the expiration date, you must request a new code. · [employer] Access Code: [email or web address] Expires: 8/14/2017  9:52 AM 
 
4. Enter the last four digits of your Social Security Number (xxxx) and Date of Birth (mm/dd/yyyy) as indicated and click Submit. You will be taken to the next sign-up page. 5. Create a [employer] ID.  This will be your [employer] login ID and cannot be changed, so think of one that is secure and easy to remember. 6. Create a [employer] password. You can change your password at any time. 7. Enter your Password Reset Question and Answer. This can be used at a later time if you forget your password. 8. Enter your e-mail address. You will receive e-mail notification when new information is available in [address] 9. Click Sign Up. You can now view and download portions of your medical record. 10. Click the Download Summary menu link to download a portable copy of your medical information. If you have questions, please visit the Frequently Asked Questions section of the [employer] website. Remember, [employer] is NOT to be used for urgent needs. For medical emergencies, dial 911. Now available from your iPhone and Android! Please provide this summary of care documentation to your next provider. Your primary care clinician is listed as Carlos Stringer. If you have any questions after today's visit, please call [phone].

## 2017-06-30 NOTE — PROGRESS NOTES
Lab Results   Component Value Date/Time    Hemoglobin A1c 6.3 05/09/2017 10:05 AM    Hemoglobin A1c, External 12.6 02/03/2016     Wt Readings from Last 3 Encounters:   06/30/17 240 lb (108.9 kg)   06/20/17 239 lb (108.4 kg)   05/16/17 241 lb 6.4 oz (109.5 kg)     Temp Readings from Last 3 Encounters:   06/30/17 98.3 °F (36.8 °C) (Oral)   06/20/17 98.2 °F (36.8 °C) (Oral)   05/16/17 97.4 °F (36.3 °C) (Oral)     BP Readings from Last 3 Encounters:   06/30/17 150/69   06/20/17 160/83   05/16/17 148/64     Pulse Readings from Last 3 Encounters:   06/30/17 70   06/20/17 84   05/16/17 76

## 2017-06-30 NOTE — PATIENT INSTRUCTIONS
Goals for blood sugar:  - fasting: less than 90 - 95  - 1 hour after a meal : less than 130  - 2 hours after a meal: less than 120    Check blood sugars immediately before breakfast  , 2 hour after the meals     Novolin N  ( cloudy )  insulin 28 units at bed time    Humalog 8 units before each meal       Additional Humalog for high sugars    Blood sugar  Fast acting insulin Breakfast/Lunch/Dinner        120-150  Add 3 units       150-200  Add 6 Units       201-250  Add 9 Units

## 2017-07-28 ENCOUNTER — OFFICE VISIT (OUTPATIENT)
Dept: ENDOCRINOLOGY | Age: 37
End: 2017-07-28

## 2017-07-28 VITALS
OXYGEN SATURATION: 100 % | BODY MASS INDEX: 38.36 KG/M2 | DIASTOLIC BLOOD PRESSURE: 70 MMHG | HEIGHT: 64 IN | TEMPERATURE: 97.7 F | HEART RATE: 66 BPM | RESPIRATION RATE: 16 BRPM | WEIGHT: 224.7 LBS | SYSTOLIC BLOOD PRESSURE: 135 MMHG

## 2017-07-28 DIAGNOSIS — I10 ESSENTIAL HYPERTENSION: ICD-10-CM

## 2017-07-28 DIAGNOSIS — O24.311 PRE-EXISTING DIABETES MELLITUS IN PREGNANCY IN FIRST TRIMESTER: ICD-10-CM

## 2017-07-28 DIAGNOSIS — O24.311 PRE-EXISTING DIABETES MELLITUS DURING PREGNANCY IN FIRST TRIMESTER: Primary | ICD-10-CM

## 2017-07-28 DIAGNOSIS — Z79.4 TYPE 2 DIABETES MELLITUS WITH HYPERGLYCEMIA, WITH LONG-TERM CURRENT USE OF INSULIN (HCC): Primary | ICD-10-CM

## 2017-07-28 DIAGNOSIS — E11.65 TYPE 2 DIABETES MELLITUS WITH HYPERGLYCEMIA, WITH LONG-TERM CURRENT USE OF INSULIN (HCC): Primary | ICD-10-CM

## 2017-07-28 DIAGNOSIS — Z79.4 TYPE 2 DIABETES MELLITUS WITH HYPERGLYCEMIA, WITH LONG-TERM CURRENT USE OF INSULIN (HCC): ICD-10-CM

## 2017-07-28 DIAGNOSIS — E11.65 TYPE 2 DIABETES MELLITUS WITH HYPERGLYCEMIA, WITH LONG-TERM CURRENT USE OF INSULIN (HCC): ICD-10-CM

## 2017-07-28 RX ORDER — SYRINGE AND NEEDLE,INSULIN,1ML 31GX15/64"
1 SYRINGE, EMPTY DISPOSABLE MISCELLANEOUS
Qty: 50 PEN NEEDLE | Refills: 11 | Status: SHIPPED | OUTPATIENT
Start: 2017-07-28 | End: 2017-08-16 | Stop reason: SDUPTHER

## 2017-07-28 NOTE — PROGRESS NOTES
[facility]               Ruthie Flores MD        [address] 78 [phone] Fax [phone]               Patient Information  Date:7/29/2017  Name : Paula Strong 40 y.o.     YOB: 1980           Chief Complaint   Patient presents with    Diabetes       History of Present Illness: Paula Strong is a 40 y.o. female here for fu of  Type 2 Diabetes Mellitus. She is on NPH at night   fastings have improved and post prandial is high depending on what she eats      Checking glucose at home  Has not noticed significant hypoglycemia       She was diagnosed with diabetes in February 2016. She has a history of gestational diabetes with last  pregnancy requiring insulin. Wt Readings from Last 3 Encounters:   07/28/17 224 lb 11.2 oz (101.9 kg)   06/30/17 240 lb (108.9 kg)   06/20/17 239 lb (108.4 kg)       BP Readings from Last 3 Encounters:   07/28/17 135/70   06/30/17 150/69   06/20/17 160/83           Past Medical History:   Diagnosis Date    Carpal tunnel syndrome     Diabetes (HCC)      Current Outpatient Prescriptions   Medication Sig    methyldopa (ALDOMET) 500 mg tablet Take 1 Tab by mouth three (3) times daily.  insulin aspart (NOVOLOG FLEXPEN) 100 unit/mL inpn Inject 8 units before each meal with SSI. Max units daily: 50 , stop Humalog    insulin NPH (NOVOLIN N) 100 unit/mL injection Inject 25 units q HS Stop Humulin N & Levemir    glucose blood VI test strips (ONETOUCH ULTRA TEST) strip Test blood glucose 6 times daily Dx Code: O24.414    Lancets misc Test blood glucose 6 times daily Dx Code: O24.414    metFORMIN (GLUCOPHAGE) 1,000 mg tablet TAKE ONE TABLET BY MOUTH TWICE DAILY WITH MEALS    Insulin Needles, Disposable, (JOSE ALBERTO PEN NEEDLE) 32 gauge x 5/32\" ndle With insulin    multivitamin (ONE A DAY) tablet Take 1 Tab by mouth daily.  labetalol (NORMODYNE) 100 mg tablet Take 1 Tab by mouth two (2) times a day.  clotrimazole-betamethasone (LOTRISONE) topical cream Twice daily to hand    loratadine (CLARITIN) 10 mg tablet Take 10 mg by mouth.  cholecalciferol (VITAMIN D3) 1,000 unit cap Take 1,000 Units by mouth three (3) times daily.  pyridoxine, vitamin B6, (VITAMIN B-6) 100 mg tablet Take 100 mg by mouth daily.  cyanocobalamin (VITAMIN B12) 500 mcg tablet Take 500 mcg by mouth daily.  ASCORBATE CALCIUM (VITAMIN C PO) Take 1,000 mg by mouth daily.  insulin syringe-needle U-100 (BD INSULIN SYRINGE ULTRA-FINE) 1 mL 31 gauge x 15/64\" syrg 1 Syringe by SubCUTAneous route nightly. DX code O24.311     No current facility-administered medications for this visit. No Known Allergies      Review of Systems:  - Constitutional Symptoms: no fevers, no chills, no weight loss  - Eyes: no blurry vision no double vision  - Cardiovascular: no chest pain ,no palpitations  - Respiratory: no cough no shortness of breath  - Gastrointestinal: no dysphagia no  abdominal pain  - Psychiatric: no depression no  anxiety  - Endocrine: no heat or cold intolerance    Physical Examination:   Blood pressure 135/70, pulse 66, temperature 97.7 °F (36.5 °C), temperature source Oral, resp. rate 16, height 5' 4\" (1.626 m), weight 224 lb 11.2 oz (101.9 kg), last menstrual period 05/17/2016, SpO2 100 %. Estimated body mass index is 38.57 kg/(m^2) as calculated from the following:    Height as of this encounter: 5' 4\" (1.626 m). -   Weight as of this encounter: 224 lb 11.2 oz (101.9 kg). General: pleasant, no distress,   HEENT: no pallor, no periorbital edema, EOMI  Neck: supple,   Cardiovascular: regular, normal rate, normal S1 and S2  Respiratory: clear to auscultation bilaterally  Neurological:alert and oriented  Psychiatric: normal mood and affect  - Skin: color, texture, turgor normal.       Data Reviewed:     [] Glucose records reviewed. [] See glucose records for details (to be scanned).   [] A1C  [] Reviewed labs      Assessment/Plan:     1. Pre-existing diabetes mellitus during pregnancy in first trimester    2. Type 2 diabetes mellitus with hyperglycemia, with long-term current use of insulin (HCC)    3. Essential hypertension        1. Type 2 Diabetes Mellitus with no nephropathy,neuropathy,retinopathy  Lab Results   Component Value Date/Time    Hemoglobin A1c 6.3 05/09/2017 10:05 AM    Hemoglobin A1c, External 12.6 02/03/2016       NPH 25 units at bedtime. Continue Metformin. Has insulin resistance. Humalog before meals       FLU annually ,Pneumovax ,aspirin daily,annual eye exam,microalbumin    2. Hirsutism - had oligomenorrhea , metformin has helped _PCOS  LMP Jan 2017       3 HTN -    4. Obesity:Body mass index is 38.57 kg/(m^2). Discussed about the importance of exercise and carbohydrate portion control. Patient Instructions   Goals for blood sugar:  - fasting: less than 90 - 95  - 1 hour after a meal : less than 130  - 2 hours after a meal: less than 120    Check blood sugars immediately before breakfast  , 2 hour after the meals     Novolin N  ( cloudy )  insulin 28 units at bed time    Humalog 6 units before each meal       Additional Humalog for high sugars    Blood sugar  insulin        120-150  Add 3 units       150-200  Add 6 Units       201-250  Add 9 Units                   Follow-up Disposition:  Return in about 4 weeks (around 8/25/2017). Thank you for allowing me to participate in the care of this patient.     Star Denney MD      Patient verbalized understanding

## 2017-07-28 NOTE — PROGRESS NOTES
Joi Spence is a 40 y.o. female here for   Chief Complaint   Patient presents with    Diabetes    A1C     Functional glucose monitor and record keeping system? - yes  Eye exam within last year? - yes  Foot exam within last year? - none    1. Have you been to the ER, urgent care clinic since your last visit? Hospitalized since your last visit? - no    2. Have you seen or consulted any other health care providers outside of the [address] [place] [address] since your last visit?   Include any pap smears or colon screening.- Dr Hale Brunner [facility]W on July 15, 2017 Prenatal visit      Lab Results   Component Value Date/Time    Hemoglobin A1c 6.3 05/09/2017 10:05 AM    Hemoglobin A1c, External 12.6 02/03/2016       Wt Readings from Last 3 Encounters:   07/28/17 224 lb 11.2 oz (101.9 kg)   06/30/17 240 lb (108.9 kg)   06/20/17 239 lb (108.4 kg)     Temp Readings from Last 3 Encounters:   07/28/17 97.7 °F (36.5 °C) (Oral)   06/30/17 98.3 °F (36.8 °C) (Oral)   06/20/17 98.2 °F (36.8 °C) (Oral)     BP Readings from Last 3 Encounters:   07/28/17 135/70   06/30/17 150/69   06/20/17 160/83     Pulse Readings from Last 3 Encounters:   07/28/17 66   06/30/17 70   06/20/17 84

## 2017-07-28 NOTE — PATIENT INSTRUCTIONS
Goals for blood sugar:  - fasting: less than 90 - 95  - 1 hour after a meal : less than 130  - 2 hours after a meal: less than 120    Check blood sugars immediately before breakfast  , 2 hour after the meals     Novolin N  ( cloudy )  insulin 28 units at bed time    Humalog 6 units before each meal       Additional Humalog for high sugars    Blood sugar  insulin        120-150  Add 3 units       150-200  Add 6 Units       201-250  Add 9 Units

## 2017-07-28 NOTE — MR AVS SNAPSHOT
Visit Information Date & Time Provider Department Dept. Phone Encounter #  
 7/28/2017  9:15 AM Kianna Bradford MD [facility] [phone] [id number] Follow-up Instructions Return in about 4 weeks (around 8/25/2017). Your Appointments 9/18/2017  8:30 AM  
ROUTINE CARE with Kianna Bradford MD  
[facility] [address]) Appt Note: f/u 4 month  
 [address] [facility] [address]  
[phone]  
  
   
 Natacha Sadler [address] [place] [address] Upcoming Health Maintenance Date Due  
 FOOT EXAM Q1 5/25/1991 EYE EXAM RETINAL OR DILATED Q1 5/25/1991 Pneumococcal 19-64 Medium Risk (1 of 1 - PPSV23) 5/25/2000 DTaP/Tdap/Td series (1 - Tdap) 5/25/2002 PAP AKA CERVICAL CYTOLOGY 5/25/2002 INFLUENZA AGE 9 TO ADULT 8/1/2017 HEMOGLOBIN A1C Q6M 11/9/2017 MICROALBUMIN Q1 12/30/2017 LIPID PANEL Q1 12/30/2017 Allergies as of 7/28/2017  Review Complete On: 7/28/2017 By: Baljeet Hanies LPN No Known Allergies Current Immunizations  Never Reviewed No immunizations on file. Not reviewed this visit You Were Diagnosed With   
  
 Codes Comments Pre-existing diabetes mellitus during pregnancy in first trimester    -  Primary ICD-10-CM: O24.311 ICD-9-CM: 648.03, 250.00 Vitals BP Pulse Temp Resp Height(growth percentile) Weight(growth percentile) 135/70 (BP 1 Location: Left arm, BP Patient Position: Sitting) 66 97.7 °F (36.5 °C) (Oral) 16 5' 4\" (1.626 m) 224 lb 11.2 oz (101.9 kg) LMP SpO2 BMI OB Status Smoking Status 05/17/2016 100% 38.57 kg/m2 Pregnant Former Smoker Vitals History BMI and BSA Data Body Mass Index Body Surface Area 38.57 kg/m 2 2.15 m 2 Preferred Pharmacy Pharmacy Name Phone [facility]", [address] [place] [address] Your Updated Medication List  
  
   
 This list is accurate as of: 7/28/17 10:13 AM.  Always use your most recent med list.  
  
  
  
  
 cholecalciferol 1,000 unit Cap Commonly known as:  VITAMIN D3 Take 1,000 Units by mouth three (3) times daily. CLARITIN 10 mg tablet Generic drug:  loratadine Take 10 mg by mouth. clotrimazole-betamethasone topical cream  
Commonly known as:  Wellington Cheese Twice daily to hand  
  
 cyanocobalamin 500 mcg tablet Commonly known as:  VITAMIN B12 Take 500 mcg by mouth daily. glucose blood VI test strips strip Commonly known as:  ONETOUCH ULTRA TEST Test blood glucose 6 times daily Dx Code: O24.414  
  
 insulin aspart 100 unit/mL Inpn Commonly known as:  Wava Plant Inject 8 units before each meal with SSI. Max units daily: 50 , stop Humalog Insulin Needles (Disposable) 32 gauge x 5/32\" Ndle Commonly known as:  Andree Pen Needle With insulin  
  
 insulin  unit/mL injection Commonly known as:  NovoLIN N Inject 25 units q HS Stop Humulin N & Levemir  
  
 insulin syringe-needle U-100 1 mL 31 gauge x 15/64\" Syrg Commonly known as:  BD INSULIN SYRINGE ULTRA-FINE  
1 Syringe by SubCUTAneous route nightly. labetalol 100 mg tablet Commonly known as:  Aniceto Nila Take 1 Tab by mouth two (2) times a day. Lancets Misc Test blood glucose 6 times daily Dx Code: O24.414  
  
 metFORMIN 1,000 mg tablet Commonly known as:  GLUCOPHAGE  
TAKE ONE TABLET BY MOUTH TWICE DAILY WITH MEALS  
  
 methyldopa 500 mg tablet Commonly known as:  ALDOMET Take 1 Tab by mouth three (3) times daily. multivitamin tablet Commonly known as:  ONE A DAY Take 1 Tab by mouth daily. pyridoxine (vitamin B6) 100 mg tablet Commonly known as:  VITAMIN B-6 Take 100 mg by mouth daily. VITAMIN C PO Take 1,000 mg by mouth daily. Follow-up Instructions Return in about 4 weeks (around 8/25/2017). Patient Instructions Goals for blood sugar: 
- fasting: less than 90 - 95 
- 1 hour after a meal : less than 130 
- 2 hours after a meal: less than 120 Check blood sugars immediately before breakfast  , 2 hour after the meals Novolin N  ( cloudy )  insulin 28 units at bed time Humalog 6 units before each meal  
 
 
Additional Humalog for high sugars Blood sugar  insulin 120-150  Add 3 units 150-200  Add 6 Units 201-250  Add 9 Units Introducing \[facility] [place] [facility]\"" & HEALTH SERVICES! Lu Ibrahim introduces [employer] patient portal. Now you can access parts of your medical record, email your doctor's office, and request medication refills online. 1. In your internet browser, go to [email or web address] 2. Click on the First Time User? Click Here link in the Sign In box. You will see the New Member Sign Up page. 3. Enter your [employer] Access Code exactly as it appears below. You will not need to use this code after youve completed the sign-up process. If you do not sign up before the expiration date, you must request a new code. · [employer] Access Code: [email or web address] Expires: 8/14/2017  9:52 AM 
 
4. Enter the last four digits of your Social Security Number (xxxx) and Date of Birth (mm/dd/yyyy) as indicated and click Submit. You will be taken to the next sign-up page. 5. Create a [employer] ID. This will be your [employer] login ID and cannot be changed, so think of one that is secure and easy to remember. 6. Create a [employer] password. You can change your password at any time. 7. Enter your Password Reset Question and Answer. This can be used at a later time if you forget your password. 8. Enter your e-mail address. You will receive e-mail notification when new information is available in [address] 9. Click Sign Up. You can now view and download portions of your medical record. 10. Click the Download Summary menu link to download a portable copy of your medical information. If you have questions, please visit the Frequently Asked Questions section of the [employer] website. Remember, [employer] is NOT to be used for urgent needs. For medical emergencies, dial 911. Now available from your iPhone and Android! Please provide this summary of care documentation to your next provider. Your primary care clinician is listed as Joice Kocher. If you have any questions after today's visit, please call [phone].

## 2017-08-16 ENCOUNTER — TELEPHONE (OUTPATIENT)
Dept: ENDOCRINOLOGY | Age: 37
End: 2017-08-16

## 2017-08-16 DIAGNOSIS — Z79.4 TYPE 2 DIABETES MELLITUS WITH HYPERGLYCEMIA, WITH LONG-TERM CURRENT USE OF INSULIN (HCC): ICD-10-CM

## 2017-08-16 DIAGNOSIS — E11.65 TYPE 2 DIABETES MELLITUS WITH HYPERGLYCEMIA, WITH LONG-TERM CURRENT USE OF INSULIN (HCC): ICD-10-CM

## 2017-08-16 DIAGNOSIS — O24.311 PRE-EXISTING DIABETES MELLITUS IN PREGNANCY IN FIRST TRIMESTER: ICD-10-CM

## 2017-08-16 RX ORDER — SYRINGE AND NEEDLE,INSULIN,1ML 31GX15/64"
1 SYRINGE, EMPTY DISPOSABLE MISCELLANEOUS 2 TIMES DAILY
Qty: 100 PEN NEEDLE | Refills: 11 | Status: SHIPPED | OUTPATIENT
Start: 2017-08-16 | End: 2017-09-05 | Stop reason: SDUPTHER

## 2017-08-16 NOTE — TELEPHONE ENCOUNTER
Patient says Dr. Lo [place] increase testing times to twice daily. Patient says a new prescription for syringes is needed to [facility].

## 2017-08-30 ENCOUNTER — OFFICE VISIT (OUTPATIENT)
Dept: ENDOCRINOLOGY | Age: 37
End: 2017-08-30

## 2017-08-30 VITALS
HEIGHT: 64 IN | WEIGHT: 248 LBS | HEART RATE: 80 BPM | SYSTOLIC BLOOD PRESSURE: 111 MMHG | DIASTOLIC BLOOD PRESSURE: 51 MMHG | OXYGEN SATURATION: 100 % | RESPIRATION RATE: 14 BRPM | TEMPERATURE: 98 F | BODY MASS INDEX: 42.34 KG/M2

## 2017-08-30 DIAGNOSIS — I10 ESSENTIAL HYPERTENSION: ICD-10-CM

## 2017-08-30 DIAGNOSIS — E11.65 TYPE 2 DIABETES MELLITUS WITH HYPERGLYCEMIA, WITH LONG-TERM CURRENT USE OF INSULIN (HCC): ICD-10-CM

## 2017-08-30 DIAGNOSIS — Z79.4 TYPE 2 DIABETES MELLITUS WITH HYPERGLYCEMIA, WITH LONG-TERM CURRENT USE OF INSULIN (HCC): ICD-10-CM

## 2017-08-30 DIAGNOSIS — O24.311 PRE-EXISTING DIABETES MELLITUS DURING PREGNANCY IN FIRST TRIMESTER: Primary | ICD-10-CM

## 2017-08-30 DIAGNOSIS — O24.311 PRE-EXISTING DIABETES MELLITUS IN PREGNANCY IN FIRST TRIMESTER: ICD-10-CM

## 2017-08-30 LAB
GLUCOSE POC: 188 MG/DL
HBA1C MFR BLD HPLC: 5.4 %

## 2017-08-30 RX ORDER — INSULIN ASPART 100 [IU]/ML
INJECTION, SOLUTION INTRAVENOUS; SUBCUTANEOUS
Qty: 30 ML | Refills: 5 | Status: SHIPPED | OUTPATIENT
Start: 2017-08-30 | End: 2017-09-05 | Stop reason: ALTCHOICE

## 2017-08-30 RX ORDER — METHYLDOPA 500 MG/1
500 TABLET, FILM COATED ORAL 3 TIMES DAILY
Qty: 90 TAB | Refills: 2 | Status: SHIPPED | OUTPATIENT
Start: 2017-08-30 | End: 2017-09-27 | Stop reason: SDUPTHER

## 2017-08-30 RX ORDER — GUAIFENESIN 100 MG/5ML
81 LIQUID (ML) ORAL DAILY
COMMUNITY
End: 2020-04-15

## 2017-08-30 NOTE — PATIENT INSTRUCTIONS
Goals for blood sugar:  - fasting: less than 90 - 95  - 1 hour after a meal : less than 130  - 2 hours after a meal: less than 120    Check blood sugars immediately before breakfast  , 2 hour after the meals     Novolin N  ( cloudy )  insulin 30 units at bed time    Humalog 10 units before each meal       Additional Humalog for high sugars    Blood sugar  insulin        120-150  Add 3 units       150-200  Add 6 Units       201-250  Add 9 Units

## 2017-08-30 NOTE — PROGRESS NOTES
Samuel Tanner is a 40 y.o. female here for   Chief Complaint   Patient presents with    Diabetes       Functional glucose monitor and record keeping system? - yes  Eye exam within last year? - Feb 2017  Foot exam within last year? - Feb 2017    1. Have you been to the ER, urgent care clinic since your last visit? Hospitalized since your last visit? -no    2. Have you seen or consulted any other health care providers outside of the [address] [place] [address] since your last visit? Include any pap smears or colon screening. -PCP      Lab Results   Component Value Date/Time    Hemoglobin A1c 6.3 05/09/2017 10:05 AM    Hemoglobin A1c, External 12.6 02/03/2016       Wt Readings from Last 3 Encounters:   07/28/17 224 lb 11.2 oz (101.9 kg)   06/30/17 240 lb (108.9 kg)   06/20/17 239 lb (108.4 kg)     Temp Readings from Last 3 Encounters:   07/28/17 97.7 °F (36.5 °C) (Oral)   06/30/17 98.3 °F (36.8 °C) (Oral)   06/20/17 98.2 °F (36.8 °C) (Oral)     BP Readings from Last 3 Encounters:   07/28/17 135/70   06/30/17 150/69   06/20/17 160/83     Pulse Readings from Last 3 Encounters:   07/28/17 66   06/30/17 70   06/20/17 84

## 2017-08-30 NOTE — PROGRESS NOTES
[facility] DIABETES AND ENDOCRINOLOGY               Richa Pizarro MD        [address] 78 [phone] Fax [phone]               Patient Information  Date:8/30/2017  Name : Yvrose Sandoval 40 y.o.     YOB: 1980           Chief Complaint   Patient presents with    Diabetes       History of Present Illness: Yvrose Sandoval is a 40 y.o. female here for fu of  Type 2 Diabetes Mellitus. She is on NPH at night   Did not send the logs weekly   Most of the BG are  At goal , few higher BG is due to more carbs       Checking glucose at home  Has not noticed significant hypoglycemia       She was diagnosed with diabetes in February 2016. She has a history of gestational diabetes with last  pregnancy requiring insulin. Wt Readings from Last 3 Encounters:   08/30/17 248 lb (112.5 kg)   07/28/17 224 lb 11.2 oz (101.9 kg)   06/30/17 240 lb (108.9 kg)       BP Readings from Last 3 Encounters:   08/30/17 111/51   07/28/17 135/70   06/30/17 150/69           Past Medical History:   Diagnosis Date    Carpal tunnel syndrome     Diabetes ([facility] [address]      Current Outpatient Prescriptions   Medication Sig    PNV38-Iron Cbn&Gluc-FA-DSS-DHA 35-1- mg cmpk Take  by mouth.  aspirin 81 mg chewable tablet Take 81 mg by mouth daily.  insulin syringe-needle U-100 (BD INSULIN SYRINGE ULTRA-FINE) 1 mL 31 gauge x 15/64\" syrg 1 Syringe by SubCUTAneous route two (2) times a day. DX code O24.311    methyldopa (ALDOMET) 500 mg tablet Take 1 Tab by mouth three (3) times daily.  insulin aspart (NOVOLOG FLEXPEN) 100 unit/mL inpn Inject 8 units before each meal with SSI.  Max units daily: 50 , stop Humalog    insulin NPH (NOVOLIN N) 100 unit/mL injection Inject 25 units q HS Stop Humulin N & Levemir    glucose blood VI test strips (ONETOUCH ULTRA TEST) strip Test blood glucose 6 times daily Dx Code: O24.414    Lancets misc Test blood glucose 6 times daily Dx Code: C98.402    metFORMIN (GLUCOPHAGE) 1,000 mg tablet TAKE ONE TABLET BY MOUTH TWICE DAILY WITH MEALS    Insulin Needles, Disposable, (JOSE ALBERTO PEN NEEDLE) 32 gauge x 5/32\" ndle With insulin    labetalol (NORMODYNE) 100 mg tablet Take 1 Tab by mouth two (2) times a day.  clotrimazole-betamethasone (LOTRISONE) topical cream Twice daily to hand    loratadine (CLARITIN) 10 mg tablet Take 10 mg by mouth.  multivitamin (ONE A DAY) tablet Take 1 Tab by mouth daily.  cholecalciferol (VITAMIN D3) 1,000 unit cap Take 1,000 Units by mouth three (3) times daily.  pyridoxine, vitamin B6, (VITAMIN B-6) 100 mg tablet Take 100 mg by mouth daily.  cyanocobalamin (VITAMIN B12) 500 mcg tablet Take 500 mcg by mouth daily.  ASCORBATE CALCIUM (VITAMIN C PO) Take 1,000 mg by mouth daily. No current facility-administered medications for this visit. No Known Allergies      Review of Systems:  - Constitutional Symptoms: no fevers, no chills, no weight loss  - Eyes: no blurry vision no double vision  - Cardiovascular: no chest pain ,no palpitations  - Respiratory: no cough no shortness of breath  - Gastrointestinal: no dysphagia no  abdominal pain  - Psychiatric: no depression no  anxiety  - Endocrine: no heat or cold intolerance    Physical Examination:   Blood pressure 111/51, pulse 80, temperature 98 °F (36.7 °C), temperature source Oral, resp. rate 14, height 5' 4\" (1.626 m), weight 248 lb (112.5 kg), last menstrual period 05/17/2016, SpO2 100 %. Estimated body mass index is 42.57 kg/(m^2) as calculated from the following:    Height as of this encounter: 5' 4\" (1.626 m). -   Weight as of this encounter: 248 lb (112.5 kg).   General: pleasant, no distress,   HEENT: no pallor, no periorbital edema, EOMI  Neck: supple,   Cardiovascular: regular, normal rate, normal S1 and S2  Respiratory: clear to auscultation bilaterally  Neurological:alert and oriented  Psychiatric: normal mood and affect  - Skin: color, texture, turgor normal. Data Reviewed:     [] Glucose records reviewed. [] See glucose records for details (to be scanned). [] A1C  [] Reviewed labs      Assessment/Plan:     1. Pre-existing diabetes mellitus during pregnancy in first trimester        1. Type 2 Diabetes Mellitus with no nephropathy,neuropathy,retinopathy  Lab Results   Component Value Date/Time    Hemoglobin A1c 6.3 05/09/2017 10:05 AM    Hemoglobin A1c (POC) 5.4 08/30/2017 09:05 AM    Hemoglobin A1c, External 12.6 02/03/2016       NPH 30 units at bedtime. Continue Metformin. Has insulin resistance. Humalog 10 units before meals       FLU annually ,Pneumovax ,aspirin daily,annual eye exam,microalbumin    2. Hirsutism - had oligomenorrhea , metformin has helped _PCOS  LMP Jan 2017       3 HTN -    4. Obesity:Body mass index is 42.57 kg/(m^2). Discussed about the importance of exercise and carbohydrate portion control. There are no Patient Instructions on file for this visit. Follow-up Disposition: Not on File    Thank you for allowing me to participate in the care of this patient.     Randall Stiles MD      Patient verbalized understanding

## 2017-08-30 NOTE — MR AVS SNAPSHOT
Visit Information Date & Time Provider Department Dept. Phone Encounter #  
 8/30/2017  8:45 AM Rosio Love MD [facility] [phone] [id number] Follow-up Instructions Return in about 4 weeks (around 9/27/2017). Your Appointments 9/18/2017  8:30 AM  
ROUTINE CARE with Rosio Love MD  
[facility] Diabetes & Endocrinology [place]) Appt Note: f/u 4 month  
 [address] [facility] [address]  
[phone]  
  
   
 [address] [place] [address] Upcoming Health Maintenance Date Due  
 FOOT EXAM Q1 5/25/1991 EYE EXAM RETINAL OR DILATED Q1 5/25/1991 Pneumococcal 19-64 Medium Risk (1 of 1 - PPSV23) 5/25/2000 DTaP/Tdap/Td series (1 - Tdap) 5/25/2002 PAP AKA CERVICAL CYTOLOGY 5/25/2002 INFLUENZA AGE 9 TO ADULT 8/1/2017 HEMOGLOBIN A1C Q6M 11/9/2017 MICROALBUMIN Q1 12/30/2017 LIPID PANEL Q1 12/30/2017 Allergies as of 8/30/2017  Review Complete On: 8/30/2017 By: Anisha Feliciano LPN No Known Allergies Current Immunizations  Never Reviewed No immunizations on file. Not reviewed this visit You Were Diagnosed With   
  
 Codes Comments Pre-existing diabetes mellitus during pregnancy in first trimester    -  Primary ICD-10-CM: O24.311 ICD-9-CM: 648.03, 250.00 Vitals BP Pulse Temp Resp Height(growth percentile) Weight(growth percentile) 111/51 (BP 1 Location: Left arm, BP Patient Position: Sitting) 80 98 °F (36.7 °C) (Oral) 14 5' 4\" (1.626 m) 248 lb (112.5 kg) LMP SpO2 BMI OB Status Smoking Status 05/17/2016 100% 42.57 kg/m2 Pregnant Former Smoker BMI and BSA Data Body Mass Index Body Surface Area 42.57 kg/m 2 2.25 m 2 Preferred Pharmacy Pharmacy Name Phone [facility] [address] Your Updated Medication List  
  
   
 This list is accurate as of: 8/30/17  9:32 AM.  Always use your most recent med list.  
  
  
  
  
 aspirin 81 mg chewable tablet Take 81 mg by mouth daily. cholecalciferol 1,000 unit Cap Commonly known as:  VITAMIN D3 Take 1,000 Units by mouth three (3) times daily. CLARITIN 10 mg tablet Generic drug:  loratadine Take 10 mg by mouth. clotrimazole-betamethasone topical cream  
Commonly known as:  Cyndra [place] Twice daily to hand  
  
 cyanocobalamin 500 mcg tablet Commonly known as:  VITAMIN B12 Take 500 mcg by mouth daily. glucose blood VI test strips strip Commonly known as:  ONETOUCH ULTRA TEST Test blood glucose 6 times daily Dx Code: O24.414  
  
 insulin aspart 100 unit/mL Inpn Commonly known as:  Luis Fernando Rower Inject 8 units before each meal with SSI. Max units daily: 50 , stop Humalog Insulin Needles (Disposable) 32 gauge x 5/32\" Ndle Commonly known as:  Andree Pen Needle With insulin  
  
 insulin  unit/mL injection Commonly known as:  NovoLIN N Inject 25 units q HS Stop Humulin N & Levemir  
  
 insulin syringe-needle U-100 1 mL 31 gauge x 15/64\" Syrg Commonly known as:  BD INSULIN SYRINGE ULTRA-FINE  
1 Syringe by SubCUTAneous route two (2) times a day. DX code O24.311  
  
 labetalol 100 mg tablet Commonly known as:  Senthil Eliana Take 1 Tab by mouth two (2) times a day. Lancets Misc Test blood glucose 6 times daily Dx Code: O24.414  
  
 metFORMIN 1,000 mg tablet Commonly known as:  GLUCOPHAGE  
TAKE ONE TABLET BY MOUTH TWICE DAILY WITH MEALS  
  
 methyldopa 500 mg tablet Commonly known as:  ALDOMET Take 1 Tab by mouth three (3) times daily. multivitamin tablet Commonly known as:  ONE A DAY Take 1 Tab by mouth daily. PNV38-Iron Cbn&Gluc-FA-DSS-DHA 35-1- mg Cmpk Take  by mouth.  
  
 pyridoxine (vitamin B6) 100 mg tablet Commonly known as:  VITAMIN B-6 Take 100 mg by mouth daily. VITAMIN C PO Take 1,000 mg by mouth daily. We Performed the Following AMB POC GLUCOSE, QUANTITATIVE, BLOOD [53563 CPT(R)] AMB POC HEMOGLOBIN A1C [39165 CPT(R)] Follow-up Instructions Return in about 4 weeks (around 9/27/2017). Patient Instructions Goals for blood sugar: 
- fasting: less than 90 - 95 
- 1 hour after a meal : less than 130 
- 2 hours after a meal: less than 120 Check blood sugars immediately before breakfast  , 2 hour after the meals Novolin N  ( cloudy )  insulin 30 units at bed time Humalog 10 units before each meal  
 
 
Additional Humalog for high sugars Blood sugar  insulin 120-150  Add 3 units 150-200  Add 6 Units 201-250  Add 9 Units Introducing [facility] & HEALTH SERVICES! Avinash Granado introduces [employer] patient portal. Now you can access parts of your medical record, email your doctor's office, and request medication refills online. 1. In your internet browser, go to [email or web address] 2. Click on the First Time User? Click Here link in the Sign In box. You will see the New Member Sign Up page. 3. Enter your [employer] Access Code exactly as it appears below. You will not need to use this code after youve completed the sign-up process. If you do not sign up before the expiration date, you must request a new code. · [employer] Access Code: [email or web address] Expires: 11/28/2017  9:32 AM 
 
4. Enter the last four digits of your Social Security Number (xxxx) and Date of Birth (mm/dd/yyyy) as indicated and click Submit. You will be taken to the next sign-up page. 5. Create a [facility] ID. This will be your [employer] login ID and cannot be changed, so think of one that is secure and easy to remember. 6. Create a [facility] password. You can change your password at any time. 7. Enter your Password Reset Question and Answer. This can be used at a later time if you forget your password. 8. Enter your e-mail address. You will receive e-mail notification when new information is available in [address] 9. Click Sign Up. You can now view and download portions of your medical record. 10. Click the Download Summary menu link to download a portable copy of your medical information. If you have questions, please visit the Frequently Asked Questions section of the [employer] website. Remember, [employer] is NOT to be used for urgent needs. For medical emergencies, dial 911. Now available from your iPhone and Android! Please provide this summary of care documentation to your next provider. Your primary care clinician is listed as Ángel Gifford. If you have any questions after today's visit, please call [phone].

## 2017-08-31 ENCOUNTER — ED HISTORICAL/CONVERTED ENCOUNTER (OUTPATIENT)
Dept: OTHER | Age: 37
End: 2017-08-31

## 2017-09-05 DIAGNOSIS — Z79.4 TYPE 2 DIABETES MELLITUS WITH HYPERGLYCEMIA, WITH LONG-TERM CURRENT USE OF INSULIN (HCC): ICD-10-CM

## 2017-09-05 DIAGNOSIS — E11.65 TYPE 2 DIABETES MELLITUS WITH HYPERGLYCEMIA, WITH LONG-TERM CURRENT USE OF INSULIN (HCC): ICD-10-CM

## 2017-09-05 DIAGNOSIS — O24.311 PRE-EXISTING DIABETES MELLITUS IN PREGNANCY IN FIRST TRIMESTER: ICD-10-CM

## 2017-09-05 RX ORDER — SYRINGE AND NEEDLE,INSULIN,1ML 31GX15/64"
1 SYRINGE, EMPTY DISPOSABLE MISCELLANEOUS 4 TIMES DAILY
Qty: 200 PEN NEEDLE | Refills: 11 | Status: SHIPPED | OUTPATIENT
Start: 2017-09-05 | End: 2018-05-17

## 2017-09-27 ENCOUNTER — OFFICE VISIT (OUTPATIENT)
Dept: ENDOCRINOLOGY | Age: 37
End: 2017-09-27

## 2017-09-27 VITALS
BODY MASS INDEX: 42.94 KG/M2 | RESPIRATION RATE: 14 BRPM | WEIGHT: 251.5 LBS | HEART RATE: 78 BPM | OXYGEN SATURATION: 100 % | TEMPERATURE: 97.7 F | HEIGHT: 64 IN | DIASTOLIC BLOOD PRESSURE: 66 MMHG | SYSTOLIC BLOOD PRESSURE: 124 MMHG

## 2017-09-27 DIAGNOSIS — I10 ESSENTIAL HYPERTENSION: ICD-10-CM

## 2017-09-27 DIAGNOSIS — E11.65 TYPE 2 DIABETES MELLITUS WITH HYPERGLYCEMIA, WITH LONG-TERM CURRENT USE OF INSULIN (HCC): Primary | ICD-10-CM

## 2017-09-27 DIAGNOSIS — Z79.4 TYPE 2 DIABETES MELLITUS WITH HYPERGLYCEMIA, WITH LONG-TERM CURRENT USE OF INSULIN (HCC): Primary | ICD-10-CM

## 2017-09-27 DIAGNOSIS — O24.311 PRE-EXISTING DIABETES MELLITUS DURING PREGNANCY IN FIRST TRIMESTER: ICD-10-CM

## 2017-09-27 LAB
GLUCOSE POC: 112 MG/DL
HBA1C MFR BLD HPLC: 5.1 %

## 2017-09-27 RX ORDER — METHYLDOPA 500 MG/1
500 TABLET, FILM COATED ORAL 3 TIMES DAILY
Qty: 90 TAB | Refills: 5 | Status: SHIPPED | OUTPATIENT
Start: 2017-09-27 | End: 2017-12-19 | Stop reason: SDUPTHER

## 2017-09-27 NOTE — MR AVS SNAPSHOT
Visit Information Date & Time Provider Department Dept. Phone Encounter #  
 9/27/2017 10:15 AM Rosio Love MD [facility] Diabetes & Endocrinology [phone] [id number] Follow-up Instructions Return in about 4 weeks (around 10/25/2017). Upcoming Health Maintenance Date Due  
 FOOT EXAM Q1 5/25/1991 EYE EXAM RETINAL OR DILATED Q1 5/25/1991 Pneumococcal 19-64 Medium Risk (1 of 1 - PPSV23) 5/25/2000 DTaP/Tdap/Td series (1 - Tdap) 5/25/2002 PAP AKA CERVICAL CYTOLOGY 5/25/2002 INFLUENZA AGE 9 TO ADULT 8/1/2017 MICROALBUMIN Q1 12/30/2017 LIPID PANEL Q1 12/30/2017 HEMOGLOBIN A1C Q6M 2/28/2018 Allergies as of 9/27/2017  Review Complete On: 9/27/2017 By: Anisha Feliciano LPN No Known Allergies Current Immunizations  Never Reviewed No immunizations on file. Not reviewed this visit You Were Diagnosed With   
  
 Codes Comments Type 2 diabetes mellitus with hyperglycemia, with long-term current use of insulin (HCC)    -  Primary ICD-10-CM: E11.65, Z79.4 ICD-9-CM: 250.00, 790.29, V58.67 Pre-existing diabetes mellitus during pregnancy in first trimester     ICD-10-CM: O24.311 ICD-9-CM: 648.03, 250.00 Vitals BP Pulse Temp Resp Height(growth percentile) Weight(growth percentile) 124/66 (BP 1 Location: Right arm, BP Patient Position: Sitting) 78 97.7 °F (36.5 °C) (Oral) 14 5' 4\" (1.626 m) 251 lb 8 oz (114.1 kg) LMP SpO2 BMI OB Status Smoking Status 05/17/2016 100% 43.17 kg/m2 Pregnant Former Smoker Vitals History BMI and BSA Data Body Mass Index Body Surface Area  
 43.17 kg/m 2 2.27 m 2 Preferred Pharmacy Pharmacy Name Phone [facility] [address] Your Updated Medication List  
  
   
This list is accurate as of: 9/27/17 10:45 AM.  Always use your most recent med list.  
  
  
  
  
 aspirin 81 mg chewable tablet Take 81 mg by mouth daily. cholecalciferol 1,000 unit Cap Commonly known as:  VITAMIN D3 Take 1,000 Units by mouth three (3) times daily. CLARITIN 10 mg tablet Generic drug:  loratadine Take 10 mg by mouth. clotrimazole-betamethasone topical cream  
Commonly known as:  Andrés Leeroy Twice daily to hand  
  
 cyanocobalamin 500 mcg tablet Commonly known as:  VITAMIN B12 Take 500 mcg by mouth daily. glucose blood VI test strips strip Commonly known as:  ONETOUCH ULTRA TEST Test blood glucose 6 times daily Dx Code: V88.432 Insulin Needles (Disposable) 32 gauge x 5/32\" Ndle Commonly known as:  Andree Pen Needle With insulin  
  
 insulin  unit/mL injection Commonly known as:  NovoLIN N Inject 30 units q HS Stop Humulin N & Levemir  
  
 insulin regular 100 unit/mL injection Commonly known as:  NovoLIN R Inject 10 units before each meal with SSI. Max units daily: 50  
  
 insulin syringe-needle U-100 1 mL 31 gauge x 15/64\" Syrg Commonly known as:  BD INSULIN SYRINGE ULTRA-FINE  
1 Syringe by SubCUTAneous route four (4) times daily. DX code O24.311  
  
 labetalol 100 mg tablet Commonly known as:  Sintia Anibal Take 1 Tab by mouth two (2) times a day. Lancets Misc Test blood glucose 6 times daily Dx Code: O24.414  
  
 metFORMIN 1,000 mg tablet Commonly known as:  GLUCOPHAGE  
TAKE ONE TABLET BY MOUTH TWICE DAILY WITH MEALS  
  
 methyldopa 500 mg tablet Commonly known as:  ALDOMET Take 1 Tab by mouth three (3) times daily. multivitamin tablet Commonly known as:  ONE A DAY Take 1 Tab by mouth daily. PNV38-Iron Cbn&Gluc-FA-DSS-DHA 35-1- mg Cmpk Take  by mouth.  
  
 pyridoxine (vitamin B6) 100 mg tablet Commonly known as:  VITAMIN B-6 Take 100 mg by mouth daily. VITAMIN C PO Take 1,000 mg by mouth daily. We Performed the Following AMB POC GLUCOSE, QUANTITATIVE, BLOOD [85863 CPT(R)] AMB POC HEMOGLOBIN A1C [98863 CPT(R)] Follow-up Instructions Return in about 4 weeks (around 10/25/2017). Patient Instructions Goals for blood sugar: 
- fasting: less than 90 - 95 
- 1 hour after a meal : less than 130 
- 2 hours after a meal: less than 120 Check blood sugars immediately before breakfast  , 2 hour after the meals Novolin N  ( cloudy )  insulin 30 units at bed time Humalog 10 units before each meal  
 
 
Additional Humalog for high sugars Blood sugar  insulin 120-150  Add 3 units 150-200  Add 6 Units 201-250  Add 9 Units Introducing [facility] & HEALTH SERVICES! [facility] introduces [employer] patient portal. Now you can access parts of your medical record, email your doctor's office, and request medication refills online. 1. In your internet browser, go to [email or web address] 2. Click on the First Time User? Click Here link in the Sign In box. You will see the New Member Sign Up page. 3. Enter your [employer] Access Code exactly as it appears below. You will not need to use this code after youve completed the sign-up process. If you do not sign up before the expiration date, you must request a new code. · [employer] Access Code: [email or web address] Expires: 11/28/2017  9:32 AM 
 
4. Enter the last four digits of your Social Security Number (xxxx) and Date of Birth (mm/dd/yyyy) as indicated and click Submit. You will be taken to the next sign-up page. 5. Create a [employer] ID. This will be your [employer] login ID and cannot be changed, so think of one that is secure and easy to remember. 6. Create a [employer] password. You can change your password at any time. 7. Enter your Password Reset Question and Answer. This can be used at a later time if you forget your password. 8. Enter your e-mail address. You will receive e-mail notification when new information is available in [address] 9. Click Sign Up. You can now view and download portions of your medical record. 10. Click the Download Summary menu link to download a portable copy of your medical information. If you have questions, please visit the Frequently Asked Questions section of the [facility] website. Remember, [facility] is NOT to be used for urgent needs. For medical emergencies, dial 911. Now available from your iPhone and Android! Please provide this summary of care documentation to your next provider. Your primary care clinician is listed as Taz Norris. If you have any questions after today's visit, please call [phone].

## 2017-09-27 NOTE — PROGRESS NOTES
[facility] AND ENDOCRINOLOGY               George Macedo MD        [address] 78 [phone] Fax [phone]               Patient Information  Date:2017  Name : Rhett Barillas 40 y.o.     YOB: 1980           Chief Complaint   Patient presents with    Diabetes     GDM       History of Present Illness: Rhett Barillas is a 40 y.o. female here for fu of  Type 2 Diabetes Mellitus.  - Dr Vandana Lundberg, [facility]    She is on NPH at night     Most of the BG are  At goal , few higher BG is due to more carbs       Checking glucose 2- 4 /day not as recommended     Today morning BG was 58 , first episode       She was diagnosed with diabetes in 2016. She has a history of gestational diabetes with last  pregnancy requiring insulin. Wt Readings from Last 3 Encounters:   17 251 lb 8 oz (114.1 kg)   17 248 lb (112.5 kg)   17 224 lb 11.2 oz (101.9 kg)       BP Readings from Last 3 Encounters:   17 124/66   17 111/51   17 135/70           Past Medical History:   Diagnosis Date    Carpal tunnel syndrome     Diabetes ([address]      Current Outpatient Prescriptions   Medication Sig    insulin regular (NOVOLIN R) 100 unit/mL injection Inject 10 units before each meal with SSI. Max units daily: 50    insulin syringe-needle U-100 (BD INSULIN SYRINGE ULTRA-FINE) 1 mL 31 gauge x 15/64\" syrg 1 Syringe by SubCUTAneous route four (4) times daily. DX code O24.311    PNV38-Iron Cbn&Gluc-FA-DSS-DHA 35-1- mg cmpk Take  by mouth.  aspirin 81 mg chewable tablet Take 81 mg by mouth daily.     insulin NPH (NOVOLIN N) 100 unit/mL injection Inject 30 units q HS Stop Humulin N & Levemir    glucose blood VI test strips (ONETOUCH ULTRA TEST) strip Test blood glucose 6 times daily Dx Code: O24.414    Lancets misc Test blood glucose 6 times daily Dx Code: O24.414    metFORMIN (GLUCOPHAGE) 1,000 mg tablet TAKE ONE TABLET BY MOUTH TWICE DAILY WITH MEALS    Insulin Needles, Disposable, (JOSE ALBERTO PEN NEEDLE) 32 gauge x 5/32\" ndle With insulin    clotrimazole-betamethasone (LOTRISONE) topical cream Twice daily to hand    loratadine (CLARITIN) 10 mg tablet Take 10 mg by mouth.  methyldopa (ALDOMET) 500 mg tablet Take 1 Tab by mouth three (3) times daily.  multivitamin (ONE A DAY) tablet Take 1 Tab by mouth daily.  cholecalciferol (VITAMIN D3) 1,000 unit cap Take 1,000 Units by mouth three (3) times daily.  pyridoxine, vitamin B6, (VITAMIN B-6) 100 mg tablet Take 100 mg by mouth daily.  cyanocobalamin (VITAMIN B12) 500 mcg tablet Take 500 mcg by mouth daily.  ASCORBATE CALCIUM (VITAMIN C PO) Take 1,000 mg by mouth daily. No current facility-administered medications for this visit. No Known Allergies      Review of Systems:  - Constitutional Symptoms: no fevers, no chills, no weight loss  - Eyes: no blurry vision no double vision  - Cardiovascular: no chest pain ,no palpitations  - Respiratory: no cough no shortness of breath  - Gastrointestinal: no dysphagia no  abdominal pain  - Psychiatric: no depression no  anxiety  - Endocrine: no heat or cold intolerance    Physical Examination:   Blood pressure 124/66, pulse 78, temperature 97.7 °F (36.5 °C), temperature source Oral, resp. rate 14, height 5' 4\" (1.626 m), weight 251 lb 8 oz (114.1 kg), last menstrual period 05/17/2016, SpO2 100 %. Estimated body mass index is 43.17 kg/(m^2) as calculated from the following:    Height as of this encounter: 5' 4\" (1.626 m). -   Weight as of this encounter: 251 lb 8 oz (114.1 kg).   General: pleasant, no distress,   HEENT: no pallor, no periorbital edema, EOMI  Neck: supple,   Cardiovascular: regular, normal rate, normal S1 and S2  Respiratory: clear to auscultation bilaterally  Neurological:alert and oriented  Psychiatric: normal mood and affect  - Skin: color, texture, turgor normal.       Data Reviewed:     [] Glucose records reviewed. [] See glucose records for details (to be scanned). [] A1C  [] Reviewed labs      Assessment/Plan:     1. Type 2 diabetes mellitus with hyperglycemia, with long-term current use of insulin ([facility] [address]    2. Pre-existing diabetes mellitus during pregnancy in first trimester        1. Type 2 Diabetes Mellitus with no nephropathy,neuropathy,retinopathy  Lab Results   Component Value Date/Time    Hemoglobin A1c 6.3 05/09/2017 10:05 AM    Hemoglobin A1c (POC) 5.1 09/27/2017 10:31 AM    Hemoglobin A1c, External 12.6 02/03/2016     NPH 30 units at bedtime. Continue Metformin. Has insulin resistance. Humalog 10 units before meals   Eye exam     FLU annually ,Pneumovax ,aspirin daily,annual eye exam,microalbumin    2. Hirsutism - had oligomenorrhea , metformin has helped _PCOS  LMP Jan 2017       3 HTN -Methyldopa    4. Obesity:Body mass index is 43.17 kg/(m^2). Discussed about the importance of exercise and carbohydrate portion control. Patient Instructions   Goals for blood sugar:  - fasting: less than 90 - 95  - 1 hour after a meal : less than 130  - 2 hours after a meal: less than 120    Check blood sugars immediately before breakfast  , 2 hour after the meals     Novolin N  ( cloudy )  insulin 30 units at bed time    Humalog 10 units before each meal       Additional Humalog for high sugars    Blood sugar  insulin        120-150  Add 3 units       150-200  Add 6 Units       201-250  Add 9 Units                   Follow-up Disposition:  Return in about 4 weeks (around 10/25/2017). Thank you for allowing me to participate in the care of this patient.     Leroy Ruiz MD      Patient verbalized understanding

## 2017-09-27 NOTE — PROGRESS NOTES
Idania Salcido is a 40 y.o. female here for   Chief Complaint   Patient presents with    Diabetes     GDM       1. Have you been to the ER, urgent care clinic since your last visit? Hospitalized since your last visit? [facility] for L ankle about 3 weeks ago    2. Have you seen or consulted any other health care providers outside of the [address] [place] [address] since your last visit? Include any pap smears or colon screening. -PCP    Wt Readings from Last 3 Encounters:   08/30/17 248 lb (112.5 kg)   07/28/17 224 lb 11.2 oz (101.9 kg)   06/30/17 240 lb (108.9 kg)     Temp Readings from Last 3 Encounters:   08/30/17 98 °F (36.7 °C) (Oral)   07/28/17 97.7 °F (36.5 °C) (Oral)   06/30/17 98.3 °F (36.8 °C) (Oral)     BP Readings from Last 3 Encounters:   08/30/17 111/51   07/28/17 135/70   06/30/17 150/69     Pulse Readings from Last 3 Encounters:   08/30/17 80   07/28/17 66   06/30/17 70

## 2017-09-28 PROBLEM — I10 ESSENTIAL HYPERTENSION: Status: ACTIVE | Noted: 2017-09-28

## 2017-10-24 ENCOUNTER — OFFICE VISIT (OUTPATIENT)
Dept: ENDOCRINOLOGY | Age: 37
End: 2017-10-24

## 2017-10-24 VITALS
BODY MASS INDEX: 43.65 KG/M2 | SYSTOLIC BLOOD PRESSURE: 122 MMHG | TEMPERATURE: 97.2 F | HEIGHT: 64 IN | WEIGHT: 255.7 LBS | DIASTOLIC BLOOD PRESSURE: 63 MMHG | HEART RATE: 82 BPM | OXYGEN SATURATION: 98 % | RESPIRATION RATE: 16 BRPM

## 2017-10-24 DIAGNOSIS — Z79.4 TYPE 2 DIABETES MELLITUS WITH HYPERGLYCEMIA, WITH LONG-TERM CURRENT USE OF INSULIN (HCC): ICD-10-CM

## 2017-10-24 DIAGNOSIS — O24.312 PRE-EXISTING DIABETES MELLITUS AFFECTING PREGNANCY IN SECOND TRIMESTER, ANTEPARTUM: ICD-10-CM

## 2017-10-24 DIAGNOSIS — O24.311 PRE-EXISTING DIABETES MELLITUS IN PREGNANCY IN FIRST TRIMESTER: ICD-10-CM

## 2017-10-24 DIAGNOSIS — E11.65 TYPE 2 DIABETES MELLITUS WITH HYPERGLYCEMIA, WITH LONG-TERM CURRENT USE OF INSULIN (HCC): ICD-10-CM

## 2017-10-24 DIAGNOSIS — I10 ESSENTIAL HYPERTENSION: ICD-10-CM

## 2017-10-24 DIAGNOSIS — E11.65 TYPE 2 DIABETES MELLITUS WITH HYPERGLYCEMIA, WITH LONG-TERM CURRENT USE OF INSULIN (HCC): Primary | ICD-10-CM

## 2017-10-24 DIAGNOSIS — Z79.4 TYPE 2 DIABETES MELLITUS WITH HYPERGLYCEMIA, WITH LONG-TERM CURRENT USE OF INSULIN (HCC): Primary | ICD-10-CM

## 2017-10-24 LAB
GLUCOSE POC: 122 MG/DL
HBA1C MFR BLD HPLC: 5.2 %

## 2017-10-24 NOTE — PATIENT INSTRUCTIONS
Goals for blood sugar:  - fasting: less than 90 - 95  - 2 hours after a meal: less than 120    Check blood sugars immediately before breakfast  , 2 hour after the meals     Novolin N  ( cloudy )  insulin 32 units at bed time    Humalog 12 units before each meal       Additional Humalog for high sugars    Blood sugar  insulin        120-150  Add 3 units       150-200  Add 6 Units       201-250  Add 9 Units

## 2017-10-24 NOTE — LETTER
10/24/2017 9:21 AM 
 
Ms. Anastacio Shone [address] [address] [address] To Whom it may concern, 
 
Ms Anastacio Shone is currently under my care at [facility]. Ms Benny Ramirez has diabetes mellitus and is currently pregnant. She is on multiple insulin injections and hence needs breaks for snacks and to check her blood glucose levels every two (2) hours. If there are any questions, please contact the office at [phone]. Sincerely, Jorge Branham MD

## 2017-10-24 NOTE — PROGRESS NOTES
Olivia Lynn is a 40 y.o. female here for   Chief Complaint   Patient presents with    Diabetes       Functional glucose monitor and record keeping system? - yes  Eye exam within last year? - yes  Foot exam within last year? - none    1. Have you been to the ER, urgent care clinic since your last visit? Hospitalized since your last visit? - no  2. Have you seen or consulted any other health care providers outside of the [address] [place] [address] since your last visit?   Include any pap smears or colon screening.- prenatal care      Lab Results   Component Value Date/Time    Hemoglobin A1c 6.3 05/09/2017 10:05 AM    Hemoglobin A1c (POC) 5.1 09/27/2017 10:31 AM    Hemoglobin A1c, External 12.6 02/03/2016       Wt Readings from Last 3 Encounters:   10/24/17 255 lb 11.2 oz (116 kg)   09/27/17 251 lb 8 oz (114.1 kg)   08/30/17 248 lb (112.5 kg)     Temp Readings from Last 3 Encounters:   10/24/17 97.2 °F (36.2 °C) (Oral)   09/27/17 97.7 °F (36.5 °C) (Oral)   08/30/17 98 °F (36.7 °C) (Oral)     BP Readings from Last 3 Encounters:   10/24/17 122/63   09/27/17 124/66   08/30/17 111/51     Pulse Readings from Last 3 Encounters:   10/24/17 82   09/27/17 78   08/30/17 80

## 2017-10-24 NOTE — PROGRESS NOTES
[facility]               Lionel Castro MD        [address] 78 [phone] Fax [phone]               Patient Information  Date:10/24/2017  Name : Rigo Mobley 40 y.o.     YOB: 1980           Chief Complaint   Patient presents with    Diabetes       History of Present Illness: Rigo Mobley is a 40 y.o. female here for fu of  Type 2 Diabetes Mellitus.  - Dr Riley Wolff, VPW    She is on NPH at night     Reviewed the log - BG at goal       Checking glucose 2- 4 /day not as recommended     Had one episode of hypo since she forgot to eat       She was diagnosed with diabetes in 2016. She has a history of gestational diabetes with last  pregnancy requiring insulin. Wt Readings from Last 3 Encounters:   10/24/17 255 lb 11.2 oz (116 kg)   17 251 lb 8 oz (114.1 kg)   17 248 lb (112.5 kg)       BP Readings from Last 3 Encounters:   10/24/17 122/63   17 124/66   17 111/51           Past Medical History:   Diagnosis Date    Carpal tunnel syndrome     Diabetes ([address]      Current Outpatient Prescriptions   Medication Sig    methyldopa (ALDOMET) 500 mg tablet Take 1 Tab by mouth three (3) times daily.  insulin regular (NOVOLIN R) 100 unit/mL injection Inject 10 units before each meal with SSI. Max units daily: 50 (Patient taking differently: Inject 12 units before each meal with SSI. Max units daily: 50)    insulin syringe-needle U-100 (BD INSULIN SYRINGE ULTRA-FINE) 1 mL 31 gauge x 15/64\" syrg 1 Syringe by SubCUTAneous route four (4) times daily. DX code O24.311    PNV38-Iron Cbn&Gluc-FA-DSS-DHA 35-1- mg cmpk Take  by mouth.  aspirin 81 mg chewable tablet Take 81 mg by mouth daily.  insulin NPH (NOVOLIN N) 100 unit/mL injection Inject 30 units q HS Stop Humulin N & Levemir (Patient taking differently: 32 Units.  Inject 30 units q HS Stop Humulin N & Levemir)    glucose blood VI test strips (ONETOUCH ULTRA TEST) strip Test blood glucose 6 times daily Dx Code: O24.414    Lancets misc Test blood glucose 6 times daily Dx Code: O24.414    metFORMIN (GLUCOPHAGE) 1,000 mg tablet TAKE ONE TABLET BY MOUTH TWICE DAILY WITH MEALS    Insulin Needles, Disposable, (JOSE ALBERTO PEN NEEDLE) 32 gauge x 5/32\" ndle With insulin    clotrimazole-betamethasone (LOTRISONE) topical cream Twice daily to hand    loratadine (CLARITIN) 10 mg tablet Take 10 mg by mouth.  multivitamin (ONE A DAY) tablet Take 1 Tab by mouth daily.  cholecalciferol (VITAMIN D3) 1,000 unit cap Take 1,000 Units by mouth three (3) times daily.  pyridoxine, vitamin B6, (VITAMIN B-6) 100 mg tablet Take 100 mg by mouth daily.  cyanocobalamin (VITAMIN B12) 500 mcg tablet Take 500 mcg by mouth daily.  ASCORBATE CALCIUM (VITAMIN C PO) Take 1,000 mg by mouth daily. No current facility-administered medications for this visit. No Known Allergies      Review of Systems:  - Constitutional Symptoms: no fevers, no chills, no weight loss  - Eyes: no blurry vision no double vision  - Cardiovascular: no chest pain ,no palpitations  - Respiratory: no cough no shortness of breath  - Gastrointestinal: no dysphagia no  abdominal pain  - Psychiatric: no depression no  anxiety  - Endocrine: no heat or cold intolerance    Physical Examination:   Blood pressure 122/63, pulse 82, temperature 97.2 °F (36.2 °C), temperature source Oral, resp. rate 16, height 5' 4\" (1.626 m), weight 255 lb 11.2 oz (116 kg), last menstrual period 05/17/2016, SpO2 98 %. Estimated body mass index is 43.89 kg/(m^2) as calculated from the following:    Height as of this encounter: 5' 4\" (1.626 m). -   Weight as of this encounter: 255 lb 11.2 oz (116 kg).   General: pleasant, no distress,   HEENT: no pallor, no periorbital edema, EOMI  Neck: supple,   Cardiovascular: regular, normal rate, normal S1 and S2  Respiratory: clear to auscultation bilaterally  Neurological:alert and oriented  Psychiatric: normal mood and affect  - Skin: color, texture, turgor normal.       Data Reviewed:     [] Glucose records reviewed. [] See glucose records for details (to be scanned). [] A1C  [] Reviewed labs      Assessment/Plan:     1. Type 2 diabetes mellitus with hyperglycemia, with long-term current use of insulin ([facility]    2. Pre-existing diabetes mellitus affecting pregnancy in second trimester, antepartum        1. Type 2 Diabetes Mellitus with no nephropathy,neuropathy,retinopathy  Lab Results   Component Value Date/Time    Hemoglobin A1c 6.3 05/09/2017 10:05 AM    Hemoglobin A1c (POC) 5.2 10/24/2017 09:00 AM    Hemoglobin A1c, External 12.6 02/03/2016     NPH 32 units at bedtime. Continue Metformin. Has insulin resistance. Humalog 12 units before meals   Eye exam     FLU annually ,Pneumovax ,aspirin daily,annual eye exam,microalbumin    2. Hirsutism - had oligomenorrhea , metformin has helped _PCOS  LMP Jan 2017       3 HTN -Methyldopa    4. Obesity:Body mass index is 43.89 kg/(m^2). Discussed about the importance of exercise and carbohydrate portion control. There are no Patient Instructions on file for this visit. Follow-up Disposition: Not on File    Thank you for allowing me to participate in the care of this patient.     Aureliano Browne MD      Patient verbalized understanding

## 2017-10-24 NOTE — MR AVS SNAPSHOT
Visit Information Date & Time Provider Department Dept. Phone Encounter #  
 10/24/2017  8:45 AM Hayley Verdugo MD [facility] [phone] [id number] Follow-up Instructions Return in about 4 weeks (around 11/21/2017). Upcoming Health Maintenance Date Due  
 FOOT EXAM Q1 5/25/1991 EYE EXAM RETINAL OR DILATED Q1 5/25/1991 Pneumococcal 19-64 Medium Risk (1 of 1 - PPSV23) 5/25/2000 DTaP/Tdap/Td series (1 - Tdap) 5/25/2002 PAP AKA CERVICAL CYTOLOGY 5/25/2002 INFLUENZA AGE 9 TO ADULT 8/1/2017 MICROALBUMIN Q1 12/30/2017 LIPID PANEL Q1 12/30/2017 HEMOGLOBIN A1C Q6M 3/27/2018 Allergies as of 10/24/2017  Review Complete On: 10/24/2017 By: Hayley Verdugo MD  
 No Known Allergies Current Immunizations  Never Reviewed No immunizations on file. Not reviewed this visit You Were Diagnosed With   
  
 Codes Comments Type 2 diabetes mellitus with hyperglycemia, with long-term current use of insulin (HCC)    -  Primary ICD-10-CM: E11.65, Z79.4 ICD-9-CM: 250.00, 790.29, V58.67 Pre-existing diabetes mellitus affecting pregnancy in second trimester, antepartum     ICD-10-CM: O24.312 ICD-9-CM: 648.03, 250.00 Vitals BP Pulse Temp Resp Height(growth percentile) Weight(growth percentile) 122/63 (BP 1 Location: Right arm, BP Patient Position: Sitting) 82 97.2 °F (36.2 °C) (Oral) 16 5' 4\" (1.626 m) 255 lb 11.2 oz (116 kg) LMP SpO2 BMI OB Status Smoking Status 05/17/2016 98% 43.89 kg/m2 Pregnant Former Smoker Vitals History BMI and BSA Data Body Mass Index Body Surface Area  
 43.89 kg/m 2 2.29 m 2 Preferred Pharmacy Pharmacy Name Phone [facility] [place], [address] [place] [address] Your Updated Medication List  
  
   
This list is accurate as of: 10/24/17  9:24 AM.  Always use your most recent med list.  
  
  
  
  
 aspirin 81 mg chewable tablet Take 81 mg by mouth daily. cholecalciferol 1,000 unit Cap Commonly known as:  VITAMIN D3 Take 1,000 Units by mouth three (3) times daily. CLARITIN 10 mg tablet Generic drug:  loratadine Take 10 mg by mouth. clotrimazole-betamethasone topical cream  
Commonly known as:  Haig  Twice daily to hand  
  
 cyanocobalamin 500 mcg tablet Commonly known as:  VITAMIN B12 Take 500 mcg by mouth daily. glucose blood VI test strips strip Commonly known as:  ONETOUCH ULTRA TEST Test blood glucose 6 times daily Dx Code: X66.900 Insulin Needles (Disposable) 32 gauge x 5/32\" Ndle Commonly known as:  Andree Pen Needle With insulin  
  
 insulin  unit/mL injection Commonly known as:  NovoLIN N Inject 30 units q HS Stop Humulin N & Levemir  
  
 insulin regular 100 unit/mL injection Commonly known as:  NovoLIN R Inject 10 units before each meal with SSI. Max units daily: 50  
  
 insulin syringe-needle U-100 1 mL 31 gauge x 15/64\" Syrg Commonly known as:  BD INSULIN SYRINGE ULTRA-FINE  
1 Syringe by SubCUTAneous route four (4) times daily. DX code S90.007 Lancets Misc Test blood glucose 6 times daily Dx Code: O24.414  
  
 metFORMIN 1,000 mg tablet Commonly known as:  GLUCOPHAGE  
TAKE ONE TABLET BY MOUTH TWICE DAILY WITH MEALS  
  
 methyldopa 500 mg tablet Commonly known as:  ALDOMET Take 1 Tab by mouth three (3) times daily. multivitamin tablet Commonly known as:  ONE A DAY Take 1 Tab by mouth daily. PNV38-Iron Cbn&Gluc-FA-DSS-DHA 35-1- mg Cmpk Take  by mouth.  
  
 pyridoxine (vitamin B6) 100 mg tablet Commonly known as:  VITAMIN B-6 Take 100 mg by mouth daily. VITAMIN C PO Take 1,000 mg by mouth daily. We Performed the Following AMB POC GLUCOSE BLOOD, BY GLUCOSE MONITORING DEVICE [98808 CPT(R)] AMB POC HEMOGLOBIN A1C [25549 CPT(R)] Follow-up Instructions Return in about 4 weeks (around 11/21/2017). Patient Instructions Goals for blood sugar: 
- fasting: less than 90 - 95 
- 2 hours after a meal: less than 120 Check blood sugars immediately before breakfast  , 2 hour after the meals Novolin N  ( cloudy )  insulin 32 units at bed time Humalog 12 units before each meal  
 
 
Additional Humalog for high sugars Blood sugar  insulin 120-150  Add 3 units 150-200  Add 6 Units 201-250  Add 9 Units Introducing [facility]! Jamesjosesito Delaney introduces [employer] patient portal. Now you can access parts of your medical record, email your doctor's office, and request medication refills online. 1. In your internet browser, go to [email or web address] 2. Click on the First Time User? Click Here link in the Sign In box. You will see the New Member Sign Up page. 3. Enter your [employer] Access Code exactly as it appears below. You will not need to use this code after youve completed the sign-up process. If you do not sign up before the expiration date, you must request a new code. · [employer] Access Code: [email or web address] Expires: 11/28/2017  9:32 AM 
 
4. Enter the last four digits of your Social Security Number (xxxx) and Date of Birth (mm/dd/yyyy) as indicated and click Submit. You will be taken to the next sign-up page. 5. Create a [employer] ID. This will be your [employer] login ID and cannot be changed, so think of one that is secure and easy to remember. 6. Create a [employer] password. You can change your password at any time. 7. Enter your Password Reset Question and Answer. This can be used at a later time if you forget your password. 8. Enter your e-mail address. You will receive e-mail notification when new information is available in [address] 9. Click Sign Up. You can now view and download portions of your medical record. 10. Click the Download Summary menu link to download a portable copy of your medical information. If you have questions, please visit the Frequently Asked Questions section of the [employer] website. Remember, [employer] is NOT to be used for urgent needs. For medical emergencies, dial 911. Now available from your iPhone and Android! Please provide this summary of care documentation to your next provider. Your primary care clinician is listed as Siena Chirinos. If you have any questions after today's visit, please call [phone].

## 2017-10-28 ENCOUNTER — ED HISTORICAL/CONVERTED ENCOUNTER (OUTPATIENT)
Dept: OTHER | Age: 37
End: 2017-10-28

## 2017-11-21 ENCOUNTER — OFFICE VISIT (OUTPATIENT)
Dept: ENDOCRINOLOGY | Age: 37
End: 2017-11-21

## 2017-11-21 VITALS
HEART RATE: 79 BPM | WEIGHT: 262.7 LBS | RESPIRATION RATE: 18 BRPM | HEIGHT: 64 IN | BODY MASS INDEX: 44.85 KG/M2 | DIASTOLIC BLOOD PRESSURE: 49 MMHG | TEMPERATURE: 96 F | OXYGEN SATURATION: 99 % | SYSTOLIC BLOOD PRESSURE: 113 MMHG

## 2017-11-21 DIAGNOSIS — I10 ESSENTIAL HYPERTENSION: ICD-10-CM

## 2017-11-21 DIAGNOSIS — E11.65 TYPE 2 DIABETES MELLITUS WITH HYPERGLYCEMIA, WITH LONG-TERM CURRENT USE OF INSULIN (HCC): ICD-10-CM

## 2017-11-21 DIAGNOSIS — Z79.4 TYPE 2 DIABETES MELLITUS WITH HYPERGLYCEMIA, WITH LONG-TERM CURRENT USE OF INSULIN (HCC): ICD-10-CM

## 2017-11-21 DIAGNOSIS — O24.312 PRE-EXISTING DIABETES MELLITUS AFFECTING PREGNANCY IN SECOND TRIMESTER, ANTEPARTUM: Primary | ICD-10-CM

## 2017-11-21 LAB
GLUCOSE POC: 164 MG/DL
HBA1C MFR BLD HPLC: 5.2 %

## 2017-11-21 NOTE — PROGRESS NOTES
Karla Navarro is a 40 y.o. female here for   Chief Complaint   Patient presents with    Diabetes       Functional glucose monitor and record keeping system? - yes  Eye exam within last year? - Feb 2017   Foot exam within last year? - Feb 2017    1. Have you been to the ER, urgent care clinic since your last visit? Hospitalized since your last visit? - no    2. Have you seen or consulted any other health care providers outside of the [address] [place] [address] since your last visit? Include any pap smears or colon screening. -OB/GYN      Lab Results   Component Value Date/Time    Hemoglobin A1c 6.3 05/09/2017 10:05 AM    Hemoglobin A1c (POC) 5.2 10/24/2017 09:00 AM    Hemoglobin A1c, External 12.6 02/03/2016       Wt Readings from Last 3 Encounters:   10/24/17 255 lb 11.2 oz (116 kg)   09/27/17 251 lb 8 oz (114.1 kg)   08/30/17 248 lb (112.5 kg)     Temp Readings from Last 3 Encounters:   10/24/17 97.2 °F (36.2 °C) (Oral)   09/27/17 97.7 °F (36.5 °C) (Oral)   08/30/17 98 °F (36.7 °C) (Oral)     BP Readings from Last 3 Encounters:   10/24/17 122/63   09/27/17 124/66   08/30/17 111/51     Pulse Readings from Last 3 Encounters:   10/24/17 82   09/27/17 78   08/30/17 80

## 2017-11-21 NOTE — MR AVS SNAPSHOT
Visit Information Date & Time Provider Department Dept. Phone Encounter #  
 11/21/2017  8:45 AM Tianna Amaya MD [facility] [phone] [id number] Follow-up Instructions Return in about 4 weeks (around 12/19/2017). Upcoming Health Maintenance Date Due  
 FOOT EXAM Q1 5/25/1991 EYE EXAM RETINAL OR DILATED Q1 5/25/1991 Pneumococcal 19-64 Medium Risk (1 of 1 - PPSV23) 5/25/2000 DTaP/Tdap/Td series (1 - Tdap) 5/25/2002 PAP AKA CERVICAL CYTOLOGY 5/25/2002 Influenza Age 5 to Adult 8/1/2017 MICROALBUMIN Q1 12/30/2017 LIPID PANEL Q1 12/30/2017 HEMOGLOBIN A1C Q6M 4/24/2018 Allergies as of 11/21/2017  Review Complete On: 11/21/2017 By: Tianna Amaya MD  
 No Known Allergies Current Immunizations  Never Reviewed No immunizations on file. Not reviewed this visit You Were Diagnosed With   
  
 Codes Comments Pre-existing diabetes mellitus affecting pregnancy in second trimester, antepartum    -  Primary ICD-10-CM: O24.312 ICD-9-CM: 648.03, 250.00 Type 2 diabetes mellitus with hyperglycemia, with long-term current use of insulin (HCC)     ICD-10-CM: E11.65, Z79.4 ICD-9-CM: 250.00, 790.29, V58.67 Vitals BP Pulse Temp Resp Height(growth percentile) Weight(growth percentile) 113/49 (BP 1 Location: Right arm, BP Patient Position: Sitting) 79 96 °F (35.6 °C) (Oral) 18 5' 4\" (1.626 m) 262 lb 11.2 oz (119.2 kg) LMP SpO2 BMI OB Status Smoking Status 05/17/2016 99% 45.09 kg/m2 Pregnant Former Smoker Vitals History BMI and BSA Data Body Mass Index Body Surface Area 45.09 kg/m 2 2.32 m 2 Preferred Pharmacy Pharmacy Name Phone [facility], [address] [place] [address] Your Updated Medication List  
  
   
This list is accurate as of: 11/21/17  9:23 AM.  Always use your most recent med list.  
  
  
  
  
 aspirin 81 mg chewable tablet Take 81 mg by mouth daily. cholecalciferol 1,000 unit Cap Commonly known as:  VITAMIN D3 Take 1,000 Units by mouth three (3) times daily. CLARITIN 10 mg tablet Generic drug:  loratadine Take 10 mg by mouth. clotrimazole-betamethasone topical cream  
Commonly known as:  Karron Dev Twice daily to hand  
  
 cyanocobalamin 500 mcg tablet Commonly known as:  VITAMIN B12 Take 500 mcg by mouth daily. glucose blood VI test strips strip Commonly known as:  ONETOUCH ULTRA TEST Test blood glucose 6 times daily Dx Code: K95.519 Insulin Needles (Disposable) 32 gauge x 5/32\" Ndle Commonly known as:  Andree Pen Needle With insulin  
  
 insulin  unit/mL injection Commonly known as:  NovoLIN N Inject 34 units q HS. Stop Humulin N & Levemir  
  
 insulin regular 100 unit/mL injection Commonly known as:  NovoLIN R Inject 14 units before each meal with SSI. Max units daily: 69  
  
 insulin syringe-needle U-100 1 mL 31 gauge x 15/64\" Syrg Commonly known as:  BD INSULIN SYRINGE ULTRA-FINE  
1 Syringe by SubCUTAneous route four (4) times daily. DX code B02.275 Lancets Misc Test blood glucose 6 times daily Dx Code: O24.414  
  
 metFORMIN 1,000 mg tablet Commonly known as:  GLUCOPHAGE  
TAKE ONE TABLET BY MOUTH TWICE DAILY WITH MEALS  
  
 methyldopa 500 mg tablet Commonly known as:  ALDOMET Take 1 Tab by mouth three (3) times daily. multivitamin tablet Commonly known as:  ONE A DAY Take 1 Tab by mouth daily. PNV38-Iron Cbn&Gluc-FA-DSS-DHA 35-1- mg Cmpk Take  by mouth.  
  
 pyridoxine (vitamin B6) 100 mg tablet Commonly known as:  VITAMIN B-6 Take 100 mg by mouth daily. VITAMIN C PO Take 1,000 mg by mouth daily. We Performed the Following AMB POC GLUCOSE, QUANTITATIVE, BLOOD [90728 CPT(R)] AMB POC HEMOGLOBIN A1C [75302 CPT(R)] Follow-up Instructions Return in about 4 weeks (around 12/19/2017). Introducing [facility]! Thalia Wiley introduces [employer] patient portal. Now you can access parts of your medical record, email your doctor's office, and request medication refills online. 1. In your internet browser, go to [email or web address] 2. Click on the First Time User? Click Here link in the Sign In box. You will see the New Member Sign Up page. 3. Enter your [employer] Access Code exactly as it appears below. You will not need to use this code after youve completed the sign-up process. If you do not sign up before the expiration date, you must request a new code. · [employer] Access Code: [email or web address] Expires: 11/28/2017  8:32 AM 
 
4. Enter the last four digits of your Social Security Number (xxxx) and Date of Birth (mm/dd/yyyy) as indicated and click Submit. You will be taken to the next sign-up page. 5. Create a [employer] ID. This will be your [employer] login ID and cannot be changed, so think of one that is secure and easy to remember. 6. Create a [employer] password. You can change your password at any time. 7. Enter your Password Reset Question and Answer. This can be used at a later time if you forget your password. 8. Enter your e-mail address. You will receive e-mail notification when new information is available in [address] 9. Click Sign Up. You can now view and download portions of your medical record. 10. Click the Download Summary menu link to download a portable copy of your medical information. If you have questions, please visit the Frequently Asked Questions section of the [employer] website. Remember, [employer] is NOT to be used for urgent needs. For medical emergencies, dial 911. Now available from your iPhone and Android! Please provide this summary of care documentation to your next provider. Your primary care clinician is listed as John Pierre.  If you have any questions after today's visit, please call [phone].

## 2017-11-21 NOTE — PROGRESS NOTES
[facility]               Adonay uCrry MD        [address] 78 [phone] Fax [phone]               Patient Information  Date:2017  Name : Komal Escalona 40 y.o.     YOB: 1980           Chief Complaint   Patient presents with    Diabetes       History of Present Illness: Komal Escalona is a 40 y.o. female here for fu of  Type 2 Diabetes Mellitus.  - Dr Joshua Rivera, VPW    She is on NPH at night . Reviewed the log - BG are mostly at goal   Few hypoglycemic episodes when she eats late    She was diagnosed with diabetes in 2016. She has a history of gestational diabetes with last  pregnancy requiring insulin. Wt Readings from Last 3 Encounters:   17 262 lb 11.2 oz (119.2 kg)   10/24/17 255 lb 11.2 oz (116 kg)   17 251 lb 8 oz (114.1 kg)       BP Readings from Last 3 Encounters:   17 113/49   10/24/17 122/63   17 124/66           Past Medical History:   Diagnosis Date    Carpal tunnel syndrome     Diabetes ([facility] [address]      Current Outpatient Prescriptions   Medication Sig    insulin NPH (NOVOLIN N) 100 unit/mL injection Inject 34 units q HS. Stop Humulin N & Levemir (Patient taking differently: Inject 36 units q HS. Stop Humulin N & Levemir)    insulin regular (NOVOLIN R) 100 unit/mL injection Inject 14 units before each meal with SSI. Max units daily: 69 (Patient taking differently: Inject 16 units before each meal with SSI. Max units daily: 69)    methyldopa (ALDOMET) 500 mg tablet Take 1 Tab by mouth three (3) times daily.  insulin syringe-needle U-100 (BD INSULIN SYRINGE ULTRA-FINE) 1 mL 31 gauge x 15/64\" syrg 1 Syringe by SubCUTAneous route four (4) times daily. DX code O24.311    PNV38-Iron Cbn&Gluc-FA-DSS-DHA 35-1- mg cmpk Take  by mouth.  aspirin 81 mg chewable tablet Take 81 mg by mouth daily.     glucose blood VI test strips (ONETOUCH ULTRA TEST) strip Test blood glucose 6 times daily Dx Code: O24.414    Lancets misc Test blood glucose 6 times daily Dx Code: O24.414    metFORMIN (GLUCOPHAGE) 1,000 mg tablet TAKE ONE TABLET BY MOUTH TWICE DAILY WITH MEALS    Insulin Needles, Disposable, (JOSE LABERTO PEN NEEDLE) 32 gauge x 5/32\" ndle With insulin    loratadine (CLARITIN) 10 mg tablet Take 10 mg by mouth.  multivitamin (ONE A DAY) tablet Take 1 Tab by mouth daily.  clotrimazole-betamethasone (LOTRISONE) topical cream Twice daily to hand    cholecalciferol (VITAMIN D3) 1,000 unit cap Take 1,000 Units by mouth three (3) times daily.  pyridoxine, vitamin B6, (VITAMIN B-6) 100 mg tablet Take 100 mg by mouth daily.  cyanocobalamin (VITAMIN B12) 500 mcg tablet Take 500 mcg by mouth daily.  ASCORBATE CALCIUM (VITAMIN C PO) Take 1,000 mg by mouth daily. No current facility-administered medications for this visit. No Known Allergies      Review of Systems:  - Constitutional Symptoms: no fevers, no chills,   - Eyes: no blurry vision no double vision  - Cardiovascular: no chest pain ,no palpitations  - Respiratory: no cough no shortness of breath  - Gastrointestinal: no dysphagia no  abdominal pain  - Psychiatric: no depression no  anxiety  - Endocrine: no heat or cold intolerance    Physical Examination:   Blood pressure 113/49, pulse 79, temperature 96 °F (35.6 °C), temperature source Oral, resp. rate 18, height 5' 4\" (1.626 m), weight 262 lb 11.2 oz (119.2 kg), last menstrual period 05/17/2016, SpO2 99 %. Estimated body mass index is 45.09 kg/(m^2) as calculated from the following:    Height as of this encounter: 5' 4\" (1.626 m). -   Weight as of this encounter: 262 lb 11.2 oz (119.2 kg).   General: pleasant, no distress,   HEENT: no pallor, no periorbital edema, EOMI  Neck: supple,   Cardiovascular: regular, normal rate, normal S1 and S2  Respiratory: clear to auscultation bilaterally  Neurological:alert and oriented  Psychiatric: normal mood and affect  - Skin: color, texture, turgor normal.       Data Reviewed:     [] Glucose records reviewed. [] See glucose records for details (to be scanned). [] A1C  [] Reviewed labs      Assessment/Plan:     1. Pre-existing diabetes mellitus affecting pregnancy in second trimester, antepartum        1. Type 2 Diabetes Mellitus   Lab Results   Component Value Date/Time    Hemoglobin A1c 6.3 05/09/2017 10:05 AM    Hemoglobin A1c (POC) 5.2 10/24/2017 09:00 AM    Hemoglobin A1c, External 12.6 02/03/2016     NPH 36 units at bedtime. Continue Metformin. Has insulin resistance. Humalog 16 units before meals   Eye exam - October 2017 ,     FLU annually ,Pneumovax ,aspirin daily,annual eye exam,microalbumin    2. Hirsutism - had oligomenorrhea , metformin has helped _PCOS  LMP Jan 2017       3 HTN -Methyldopa    4. Obesity:Body mass index is 45.09 kg/(m^2). Discussed about the importance of exercise and carbohydrate portion control. There are no Patient Instructions on file for this visit. Follow-up Disposition: Not on File    Thank you for allowing me to participate in the care of this patient.     Shraddha Park MD      Patient verbalized understanding

## 2017-12-19 ENCOUNTER — OFFICE VISIT (OUTPATIENT)
Dept: ENDOCRINOLOGY | Age: 37
End: 2017-12-19

## 2017-12-19 VITALS
WEIGHT: 269.2 LBS | RESPIRATION RATE: 16 BRPM | OXYGEN SATURATION: 99 % | DIASTOLIC BLOOD PRESSURE: 54 MMHG | HEART RATE: 85 BPM | TEMPERATURE: 96.7 F | BODY MASS INDEX: 45.96 KG/M2 | HEIGHT: 64 IN | SYSTOLIC BLOOD PRESSURE: 104 MMHG

## 2017-12-19 DIAGNOSIS — I10 ESSENTIAL HYPERTENSION: ICD-10-CM

## 2017-12-19 DIAGNOSIS — E11.65 TYPE 2 DIABETES MELLITUS WITH HYPERGLYCEMIA, WITH LONG-TERM CURRENT USE OF INSULIN (HCC): ICD-10-CM

## 2017-12-19 DIAGNOSIS — E11.65 TYPE 2 DIABETES MELLITUS WITH HYPERGLYCEMIA, WITH LONG-TERM CURRENT USE OF INSULIN (HCC): Primary | ICD-10-CM

## 2017-12-19 DIAGNOSIS — O24.311 PRE-EXISTING DIABETES MELLITUS IN PREGNANCY IN FIRST TRIMESTER: ICD-10-CM

## 2017-12-19 DIAGNOSIS — Z79.4 TYPE 2 DIABETES MELLITUS WITH HYPERGLYCEMIA, WITH LONG-TERM CURRENT USE OF INSULIN (HCC): Primary | ICD-10-CM

## 2017-12-19 DIAGNOSIS — Z79.4 TYPE 2 DIABETES MELLITUS WITH HYPERGLYCEMIA, WITH LONG-TERM CURRENT USE OF INSULIN (HCC): ICD-10-CM

## 2017-12-19 DIAGNOSIS — O24.313 PREEXISTING DIABETES COMPLICATING PREGNANCY IN THIRD TRIMESTER, ANTEPARTUM: ICD-10-CM

## 2017-12-19 LAB
GLUCOSE POC: 110 MG/DL
HBA1C MFR BLD HPLC: 5.3 %

## 2017-12-19 RX ORDER — METHYLDOPA 500 MG/1
500 TABLET, FILM COATED ORAL 3 TIMES DAILY
Qty: 90 TAB | Refills: 5 | Status: SHIPPED | OUTPATIENT
Start: 2017-12-19 | End: 2018-05-17 | Stop reason: ALTCHOICE

## 2017-12-19 RX ORDER — LABETALOL 200 MG/1
200 TABLET, FILM COATED ORAL 2 TIMES DAILY
COMMUNITY
End: 2018-05-17 | Stop reason: SDUPTHER

## 2017-12-19 NOTE — PROGRESS NOTES
Ezequiel Lynn is a 40 y.o. female here for   Chief Complaint   Patient presents with    Diabetes       Functional glucose monitor and record keeping system? - yes  Eye exam within last year? - Feb 2017  Foot exam within last year? - Feb 2017    1. Have you been to the ER, urgent care clinic since your last visit? Hospitalized since your last visit? -no    2. Have you seen or consulted any other health care providers outside of the [address] [place] [address] since your last visit?   Include any pap smears or colon screening.-no      Lab Results   Component Value Date/Time    Hemoglobin A1c 6.3 05/09/2017 10:05 AM    Hemoglobin A1c (POC) 5.2 11/21/2017 09:10 AM    Hemoglobin A1c, External 12.6 02/03/2016       Wt Readings from Last 3 Encounters:   11/21/17 262 lb 11.2 oz (119.2 kg)   10/24/17 255 lb 11.2 oz (116 kg)   09/27/17 251 lb 8 oz (114.1 kg)     Temp Readings from Last 3 Encounters:   11/21/17 96 °F (35.6 °C) (Oral)   10/24/17 97.2 °F (36.2 °C) (Oral)   09/27/17 97.7 °F (36.5 °C) (Oral)     BP Readings from Last 3 Encounters:   11/21/17 113/49   10/24/17 122/63   09/27/17 124/66     Pulse Readings from Last 3 Encounters:   11/21/17 79   10/24/17 82   09/27/17 78

## 2017-12-19 NOTE — PROGRESS NOTES
[facility]               Shraddha Park MD        [address] 78 [phone] Fax [phone]               Patient Information  Date:12/19/2017  Name : Abimbola Garcia 40 y.o.     YOB: 1980           Chief Complaint   Patient presents with    Diabetes       History of Present Illness: Abimbola Garcia is a 40 y.o. female here for fu of  Type 2 Diabetes Mellitus. Perinatalogist- Dr Nuria Owen, W    She is on NPH at night . Reviewed the log - not checking as recommended  fastings 80 - 100    She was diagnosed with diabetes in February 2016. She has a history of gestational diabetes with last  pregnancy requiring insulin. Wt Readings from Last 3 Encounters:   12/19/17 269 lb 3.2 oz (122.1 kg)   11/21/17 262 lb 11.2 oz (119.2 kg)   10/24/17 255 lb 11.2 oz (116 kg)       BP Readings from Last 3 Encounters:   12/19/17 104/54   11/21/17 113/49   10/24/17 122/63           Past Medical History:   Diagnosis Date    Carpal tunnel syndrome     Diabetes (HCC)      Current Outpatient Prescriptions   Medication Sig    labetalol (NORMODYNE) 200 mg tablet Take 200 mg by mouth two (2) times a day.  insulin NPH (NOVOLIN N) 100 unit/mL injection Inject 34 units q HS. Stop Humulin N & Levemir (Patient taking differently: Inject 36 units q HS. Stop Humulin N & Levemir)    insulin regular (NOVOLIN R) 100 unit/mL injection Inject 14 units before each meal with SSI. Max units daily: 69 (Patient taking differently: Inject 16 units before each meal with SSI. Max units daily: 69)    methyldopa (ALDOMET) 500 mg tablet Take 1 Tab by mouth three (3) times daily.  insulin syringe-needle U-100 (BD INSULIN SYRINGE ULTRA-FINE) 1 mL 31 gauge x 15/64\" syrg 1 Syringe by SubCUTAneous route four (4) times daily. DX code O24.311    PNV38-Iron Cbn&Gluc-FA-DSS-DHA 35-1- mg cmpk Take  by mouth.     aspirin 81 mg chewable tablet Take 81 mg by mouth daily.  glucose blood VI test strips (ONETOUCH ULTRA TEST) strip Test blood glucose 6 times daily Dx Code: O24.414    Lancets misc Test blood glucose 6 times daily Dx Code: O24.414    metFORMIN (GLUCOPHAGE) 1,000 mg tablet TAKE ONE TABLET BY MOUTH TWICE DAILY WITH MEALS    Insulin Needles, Disposable, (JOSE ALBERTO PEN NEEDLE) 32 gauge x 5/32\" ndle With insulin    multivitamin (ONE A DAY) tablet Take 1 Tab by mouth daily.  loratadine (CLARITIN) 10 mg tablet Take 10 mg by mouth.  clotrimazole-betamethasone (LOTRISONE) topical cream Twice daily to hand    cholecalciferol (VITAMIN D3) 1,000 unit cap Take 1,000 Units by mouth three (3) times daily.  pyridoxine, vitamin B6, (VITAMIN B-6) 100 mg tablet Take 100 mg by mouth daily.  cyanocobalamin (VITAMIN B12) 500 mcg tablet Take 500 mcg by mouth daily.  ASCORBATE CALCIUM (VITAMIN C PO) Take 1,000 mg by mouth daily. No current facility-administered medications for this visit. No Known Allergies      Review of Systems:  - Constitutional Symptoms: no fevers, no chills,   - Eyes: no blurry vision no double vision  - Cardiovascular: no chest pain ,no palpitations  - Respiratory: no cough no shortness of breath  - Gastrointestinal: no dysphagia no  abdominal pain  - Psychiatric: no depression no  anxiety  - Endocrine: no heat or cold intolerance    Physical Examination:   Blood pressure 104/54, pulse 85, temperature 96.7 °F (35.9 °C), temperature source Oral, resp. rate 16, height 5' 4\" (1.626 m), weight 269 lb 3.2 oz (122.1 kg), last menstrual period 05/17/2016, SpO2 99 %. Estimated body mass index is 46.21 kg/(m^2) as calculated from the following:    Height as of this encounter: 5' 4\" (1.626 m). -   Weight as of this encounter: 269 lb 3.2 oz (122.1 kg).   General: pleasant, no distress,   HEENT: no pallor, no periorbital edema, EOMI  Neck: supple,   Cardiovascular: regular, normal rate, normal S1 and S2  Respiratory: clear to auscultation bilaterally  Neurological:alert and oriented  Psychiatric: normal mood and affect  - Skin: color, texture, turgor normal.       Data Reviewed:     [] Glucose records reviewed. [] See glucose records for details (to be scanned). [] A1C  [] Reviewed labs      Assessment/Plan:     1. Pre-existing diabetes mellitus affecting pregnancy in second trimester, antepartum    2. Type 2 diabetes mellitus with hyperglycemia, with long-term current use of insulin (HCC)    3. Essential hypertension    4. Hirsutism        1. Type 2 Diabetes Mellitus   Lab Results   Component Value Date/Time    Hemoglobin A1c 6.3 05/09/2017 10:05 AM    Hemoglobin A1c (POC) 5.2 11/21/2017 09:10 AM    Hemoglobin A1c, External 12.6 02/03/2016     NPH 40 units at bedtime. Continue Metformin. Has insulin resistance. Humalog 18 units before meals   Eye exam - October 2017 ,     FLU annually ,Pneumovax ,aspirin daily,annual eye exam,microalbumin    2. Hirsutism - had oligomenorrhea , metformin has helped _PCOS  LMP Jan 2017       3 HTN -Methyldopa    4. Obesity:Body mass index is 46.21 kg/(m^2). Discussed about the importance of exercise and carbohydrate portion control. There are no Patient Instructions on file for this visit. Follow-up Disposition: Not on File    Thank you for allowing me to participate in the care of this patient.     Medadro Rosas MD      Patient verbalized understanding

## 2017-12-19 NOTE — PATIENT INSTRUCTIONS
Goals for blood sugar:  - fasting: less than 90 - 95  - 2 hours after a meal: less than 120    Check blood sugars immediately before breakfast  , 2 hour after the meals     Novolin N  ( cloudy )  insulin 40 units at bed time    Humalog or Novolin R  18 units before each meal       Additional Humalog or Novolin R  for high sugars    Blood sugar  insulin        120-150  Add 3 units       150-200  Add 6 Units       201-250  Add 9 Units             The night before delivery - take the full dose of Novolin N even if you are not eating after midnight     After delivery continue metformin     Follow up in 6 weeks

## 2017-12-19 NOTE — MR AVS SNAPSHOT
Visit Information Date & Time Provider Department Dept. Phone Encounter #  
 12/19/2017  8:45 AM Hayley Verdugo MD [facility] [phone] [id number] Follow-up Instructions Return in about 4 weeks (around 1/16/2018). Upcoming Health Maintenance Date Due  
 FOOT EXAM Q1 5/25/1991 EYE EXAM RETINAL OR DILATED Q1 5/25/1991 Pneumococcal 19-64 Medium Risk (1 of 1 - PPSV23) 5/25/2000 DTaP/Tdap/Td series (1 - Tdap) 5/25/2002 PAP AKA CERVICAL CYTOLOGY 5/25/2002 Influenza Age 5 to Adult 8/1/2017 MICROALBUMIN Q1 12/30/2017 LIPID PANEL Q1 12/30/2017 HEMOGLOBIN A1C Q6M 5/21/2018 Allergies as of 12/19/2017  Review Complete On: 12/19/2017 By: Hayley Verdugo MD  
 No Known Allergies Current Immunizations  Never Reviewed No immunizations on file. Not reviewed this visit You Were Diagnosed With   
  
 Codes Comments Pre-existing diabetes mellitus affecting pregnancy in second trimester, antepartum    -  Primary ICD-10-CM: O24.312 ICD-9-CM: 648.03, 250.00 Type 2 diabetes mellitus with hyperglycemia, with long-term current use of insulin (HCC)     ICD-10-CM: E11.65, Z79.4 ICD-9-CM: 250.00, 790.29, V58.67 Essential hypertension     ICD-10-CM: I10 
ICD-9-CM: 401.9 Hirsutism     ICD-10-CM: L68.0 ICD-9-CM: 704.1 Vitals BP Pulse Temp Resp Height(growth percentile) Weight(growth percentile) 104/54 (BP 1 Location: Right arm, BP Patient Position: Sitting) 85 96.7 °F (35.9 °C) (Oral) 16 5' 4\" (1.626 m) 269 lb 3.2 oz (122.1 kg) LMP SpO2 BMI OB Status Smoking Status 05/17/2016 99% 46.21 kg/m2 Pregnant Former Smoker Vitals History BMI and BSA Data Body Mass Index Body Surface Area  
 46.21 kg/m 2 2.35 m 2 Preferred Pharmacy Pharmacy Name Phone [facility] [address] Your Updated Medication List  
  
   
 This list is accurate as of: 12/19/17  9:19 AM.  Always use your most recent med list.  
  
  
  
  
 aspirin 81 mg chewable tablet Take 81 mg by mouth daily. cholecalciferol 1,000 unit Cap Commonly known as:  VITAMIN D3 Take 1,000 Units by mouth three (3) times daily. CLARITIN 10 mg tablet Generic drug:  loratadine Take 10 mg by mouth. clotrimazole-betamethasone topical cream  
Commonly known as:  Monet Mole Twice daily to hand  
  
 cyanocobalamin 500 mcg tablet Commonly known as:  VITAMIN B12 Take 500 mcg by mouth daily. glucose blood VI test strips strip Commonly known as:  ONETOUCH ULTRA TEST Test blood glucose 6 times daily Dx Code: Z26.021 Insulin Needles (Disposable) 32 gauge x 5/32\" Ndle Commonly known as:  Andree Pen Needle With insulin  
  
 insulin  unit/mL injection Commonly known as:  NovoLIN N Inject 34 units q HS. Stop Humulin N & Levemir  
  
 insulin regular 100 unit/mL injection Commonly known as:  NovoLIN R Inject 14 units before each meal with SSI. Max units daily: 69  
  
 insulin syringe-needle U-100 1 mL 31 gauge x 15/64\" Syrg Commonly known as:  BD INSULIN SYRINGE ULTRA-FINE  
1 Syringe by SubCUTAneous route four (4) times daily. DX code O24.311  
  
 labetalol 200 mg tablet Commonly known as:  Keyana Bump Take 200 mg by mouth two (2) times a day. Lancets Misc Test blood glucose 6 times daily Dx Code: O24.414  
  
 metFORMIN 1,000 mg tablet Commonly known as:  GLUCOPHAGE  
TAKE ONE TABLET BY MOUTH TWICE DAILY WITH MEALS  
  
 methyldopa 500 mg tablet Commonly known as:  ALDOMET Take 1 Tab by mouth three (3) times daily. multivitamin tablet Commonly known as:  ONE A DAY Take 1 Tab by mouth daily. PNV38-Iron Cbn&Gluc-FA-DSS-DHA 35-1- mg Cmpk Take  by mouth.  
  
 pyridoxine (vitamin B6) 100 mg tablet Commonly known as:  VITAMIN B-6 Take 100 mg by mouth daily.   
  
 VITAMIN C PO  
 Take 1,000 mg by mouth daily. Follow-up Instructions Return in about 4 weeks (around 1/16/2018). Patient Instructions Goals for blood sugar: 
- fasting: less than 90 - 95 
- 2 hours after a meal: less than 120 Check blood sugars immediately before breakfast  , 2 hour after the meals Novolin N  ( cloudy )  insulin 40 units at bed time Humalog or Novolin R  18 units before each meal  
 
 
Additional Humalog or Novolin R  for high sugars Blood sugar  insulin 120-150  Add 3 units 150-200  Add 6 Units 201-250  Add 9 Units The night before delivery - take the full dose of Novolin N even if you are not eating after midnight After delivery continue metformin Follow up in 6 weeks Introducing [facility] SERVICES! [facility] introduces [employer] patient portal. Now you can access parts of your medical record, email your doctor's office, and request medication refills online. 1. In your internet browser, go to [email or web address] 2. Click on the First Time User? Click Here link in the Sign In box. You will see the New Member Sign Up page. 3. Enter your Gigantt Access Code exactly as it appears below. You will not need to use this code after youve completed the sign-up process. If you do not sign up before the expiration date, you must request a new code. · Gigantt Access Code: [email or web address] Expires: 3/19/2018  9:18 AM 
 
4. Enter the last four digits of your Social Security Number (xxxx) and Date of Birth (mm/dd/yyyy) as indicated and click Submit. You will be taken to the next sign-up page. 5. Create a LoopUpt ID. This will be your Gigantt login ID and cannot be changed, so think of one that is secure and easy to remember. 6. Create a Gigantt password. You can change your password at any time. 7. Enter your Password Reset Question and Answer. This can be used at a later time if you forget your password. 8. Enter your e-mail address. You will receive e-mail notification when new information is available in [address] 9. Click Sign Up. You can now view and download portions of your medical record. 10. Click the Download Summary menu link to download a portable copy of your medical information. If you have questions, please visit the Frequently Asked Questions section of the [employer] website. Remember, [employer] is NOT to be used for urgent needs. For medical emergencies, dial 911. Now available from your iPhone and Android! Please provide this summary of care documentation to your next provider. Your primary care clinician is listed as Walsh Peer. If you have any questions after today's visit, please call [phone].

## 2018-01-15 DIAGNOSIS — Z79.4 UNCONTROLLED TYPE 2 DIABETES MELLITUS WITH HYPERGLYCEMIA, WITH LONG-TERM CURRENT USE OF INSULIN (HCC): ICD-10-CM

## 2018-01-15 DIAGNOSIS — E11.65 UNCONTROLLED TYPE 2 DIABETES MELLITUS WITH HYPERGLYCEMIA, WITH LONG-TERM CURRENT USE OF INSULIN (HCC): ICD-10-CM

## 2018-01-15 DIAGNOSIS — L68.0 HIRSUTISM: ICD-10-CM

## 2018-01-15 RX ORDER — METFORMIN HYDROCHLORIDE 1000 MG/1
TABLET ORAL
Qty: 60 TAB | Refills: 6 | Status: SHIPPED | OUTPATIENT
Start: 2018-01-15 | End: 2018-05-09 | Stop reason: SDUPTHER

## 2018-01-16 ENCOUNTER — OFFICE VISIT (OUTPATIENT)
Dept: ENDOCRINOLOGY | Age: 38
End: 2018-01-16

## 2018-01-16 VITALS
HEART RATE: 75 BPM | RESPIRATION RATE: 16 BRPM | HEIGHT: 64 IN | BODY MASS INDEX: 48.4 KG/M2 | SYSTOLIC BLOOD PRESSURE: 113 MMHG | DIASTOLIC BLOOD PRESSURE: 49 MMHG | WEIGHT: 283.5 LBS | OXYGEN SATURATION: 98 % | TEMPERATURE: 97.7 F

## 2018-01-16 DIAGNOSIS — I10 ESSENTIAL HYPERTENSION: ICD-10-CM

## 2018-01-16 DIAGNOSIS — O24.313 PRE-EXISTING DIABETES MELLITUS AFFECTING PREGNANCY IN THIRD TRIMESTER, ANTEPARTUM: Primary | ICD-10-CM

## 2018-01-16 DIAGNOSIS — Z79.4 TYPE 2 DIABETES MELLITUS WITH HYPERGLYCEMIA, WITH LONG-TERM CURRENT USE OF INSULIN (HCC): ICD-10-CM

## 2018-01-16 DIAGNOSIS — E11.65 TYPE 2 DIABETES MELLITUS WITH HYPERGLYCEMIA, WITH LONG-TERM CURRENT USE OF INSULIN (HCC): ICD-10-CM

## 2018-01-16 LAB
GLUCOSE POC: 121 MG/DL
HBA1C MFR BLD HPLC: 5.2 %

## 2018-01-16 NOTE — PROGRESS NOTES
Dilcia Mathew is a 40 y.o. female here for   Chief Complaint   Patient presents with    Diabetes     pregnant       Functional glucose monitor and record keeping system? - yes  Eye exam within last year? - yes, on file    1. Have you been to the ER, urgent care clinic since your last visit? Hospitalized since your last visit? -no    2. Have you seen or consulted any other health care providers outside of the [address] [place] [address] since your last visit? Include any pap smears or colon screening. -OB GYN      Lab Results   Component Value Date/Time    Hemoglobin A1c 6.3 05/09/2017 10:05 AM    Hemoglobin A1c (POC) 5.3 12/19/2017 09:22 AM    Hemoglobin A1c, External 12.6 02/03/2016       Wt Readings from Last 3 Encounters:   12/19/17 269 lb 3.2 oz (122.1 kg)   11/21/17 262 lb 11.2 oz (119.2 kg)   10/24/17 255 lb 11.2 oz (116 kg)     Temp Readings from Last 3 Encounters:   12/19/17 96.7 °F (35.9 °C) (Oral)   11/21/17 96 °F (35.6 °C) (Oral)   10/24/17 97.2 °F (36.2 °C) (Oral)     BP Readings from Last 3 Encounters:   12/19/17 104/54   11/21/17 113/49   10/24/17 122/63     Pulse Readings from Last 3 Encounters:   12/19/17 85   11/21/17 79   10/24/17 82

## 2018-01-16 NOTE — PATIENT INSTRUCTIONS
Goals for blood sugar:  - fasting: less than 90 - 95  - 2 hours after a meal: less than 120    Check blood sugars immediately before breakfast  , 2 hour after the meals     Novolin N  ( cloudy )  insulin 40 units at bed time    Humalog or Novolin R  26  units before each meal       Additional Humalog or Novolin R  for high sugars    Blood sugar  insulin        120-150  Add 3 units       150-200  Add 6 Units       201-250  Add 9 Units             The night before delivery - take the full dose of Novolin N even if you are not eating after midnight     After delivery continue metformin     Follow up in 6 weeks

## 2018-01-16 NOTE — MR AVS SNAPSHOT
[address] [facility] [address] 
[phone] Patient: Kathi Roldan MRN: [id number] PGQ:3/45/4345 Visit Information Date & Time Provider Department Dept. Phone Encounter #  
 1/16/2018  8:45 AM Leslie Castro MD [facility] [phone] [id number] Follow-up Instructions Return in about 3 weeks (around 2/6/2018). Upcoming Health Maintenance Date Due  
 FOOT EXAM Q1 5/25/1991 Pneumococcal 19-64 Medium Risk (1 of 1 - PPSV23) 5/25/2000 DTaP/Tdap/Td series (1 - Tdap) 5/25/2002 PAP AKA CERVICAL CYTOLOGY 5/25/2002 Influenza Age 5 to Adult 8/1/2017 MICROALBUMIN Q1 12/30/2017 LIPID PANEL Q1 12/30/2017 HEMOGLOBIN A1C Q6M 6/19/2018 EYE EXAM RETINAL OR DILATED Q1 11/2/2018 Allergies as of 1/16/2018  Review Complete On: 1/16/2018 By: Leslie Castro MD  
 No Known Allergies Current Immunizations  Never Reviewed No immunizations on file. Not reviewed this visit You Were Diagnosed With   
  
 Codes Comments Pre-existing diabetes mellitus affecting pregnancy in third trimester, antepartum    -  Primary ICD-10-CM: O24.313 ICD-9-CM: 648.03, 250.00 Type 2 diabetes mellitus with hyperglycemia, with long-term current use of insulin (HCC)     ICD-10-CM: E11.65, Z79.4 ICD-9-CM: 250.00, 790.29, V58.67 Essential hypertension     ICD-10-CM: I10 
ICD-9-CM: 401.9 Vitals BP Pulse Temp Resp Height(growth percentile) Weight(growth percentile) 113/49 (BP 1 Location: Right arm, BP Patient Position: Sitting) 75 97.7 °F (36.5 °C) (Oral) 16 5' 4\" (1.626 m) 283 lb 8 oz (128.6 kg) SpO2 BMI OB Status Smoking Status 98% 48.66 kg/m2 Pregnant Former Smoker Vitals History BMI and BSA Data Body Mass Index Body Surface Area  
 48.66 kg/m 2 2.41 m 2 Preferred Pharmacy Pharmacy Name Phone [facility] [place], [address] [place] [address] [phone] Your Updated Medication List  
  
   
This list is accurate as of: 1/16/18  9:23 AM.  Always use your most recent med list.  
  
  
  
  
 aspirin 81 mg chewable tablet Take 81 mg by mouth daily. cholecalciferol 1,000 unit Cap Commonly known as:  VITAMIN D3 Take 1,000 Units by mouth three (3) times daily. CLARITIN 10 mg tablet Generic drug:  loratadine Take 10 mg by mouth. clotrimazole-betamethasone topical cream  
Commonly known as:  Ruiz Stake Twice daily to hand  
  
 cyanocobalamin 500 mcg tablet Commonly known as:  VITAMIN B12 Take 500 mcg by mouth daily. glucose blood VI test strips strip Commonly known as:  ONETOUCH ULTRA TEST Test blood glucose 6 times daily Dx Code: Y79.759 Insulin Needles (Disposable) 32 gauge x 5/32\" Ndle Commonly known as:  Andree Pen Needle With insulin  
  
 insulin  unit/mL injection Commonly known as:  NovoLIN N Inject 40 units q HS. Stop Humulin N & Levemir  
  
 insulin regular 100 unit/mL injection Commonly known as:  NovoLIN R Inject 18 units before each meal with SSI. Max units daily: 69  
  
 insulin syringe-needle U-100 1 mL 31 gauge x 15/64\" Syrg Commonly known as:  BD INSULIN SYRINGE ULTRA-FINE  
1 Syringe by SubCUTAneous route four (4) times daily. DX code O24.311  
  
 labetalol 200 mg tablet Commonly known as:  Willma Downy Take 200 mg by mouth two (2) times a day. Lancets Misc Test blood glucose 6 times daily Dx Code: O24.414  
  
 metFORMIN 1,000 mg tablet Commonly known as:  GLUCOPHAGE  
TAKE ONE TABLET BY MOUTH TWICE DAILY WITH MEALS  
  
 methyldopa 500 mg tablet Commonly known as:  ALDOMET Take 1 Tab by mouth three (3) times daily. multivitamin tablet Commonly known as:  ONE A DAY Take 1 Tab by mouth daily. PNV38-Iron Cbn&Gluc-FA-DSS-DHA 35-1- mg Cmpk Take  by mouth. pyridoxine (vitamin B6) 100 mg tablet Commonly known as:  VITAMIN B-6 Take 100 mg by mouth daily. VITAMIN C PO Take 1,000 mg by mouth daily. We Performed the Following AMB POC GLUCOSE, QUANTITATIVE, BLOOD [46203 CPT(R)] AMB POC HEMOGLOBIN A1C [51347 CPT(R)] Follow-up Instructions Return in about 3 weeks (around 2/6/2018). Patient Instructions Goals for blood sugar: 
- fasting: less than 90 - 95 
- 2 hours after a meal: less than 120 Check blood sugars immediately before breakfast  , 2 hour after the meals Novolin N  ( cloudy )  insulin 40 units at bed time Humalog or Novolin R  26  units before each meal  
 
 
Additional Humalog or Novolin R  for high sugars Blood sugar  insulin 120-150  Add 3 units 150-200  Add 6 Units 201-250  Add 9 Units The night before delivery - take the full dose of Novolin N even if you are not eating after midnight After delivery continue metformin Follow up in 6 weeks Introducing [facility]! [facility] introduces [employer] patient portal. Now you can access parts of your medical record, email your doctor's office, and request medication refills online. 1. In your internet browser, go to [email or web address] 2. Click on the First Time User? Click Here link in the Sign In box. You will see the New Member Sign Up page. 3. Enter your [employer] Access Code exactly as it appears below. You will not need to use this code after youve completed the sign-up process. If you do not sign up before the expiration date, you must request a new code. · [employer] Access Code: [email or web address] Expires: 3/19/2018  9:18 AM 
 
4. Enter the last four digits of your Social Security Number (xxxx) and Date of Birth (mm/dd/yyyy) as indicated and click Submit. You will be taken to the next sign-up page. 5. Create a PostBeyond ID. This will be your PostBeyond login ID and cannot be changed, so think of one that is secure and easy to remember. 6. Create a PostBeyond password. You can change your password at any time. 7. Enter your Password Reset Question and Answer. This can be used at a later time if you forget your password. 8. Enter your e-mail address. You will receive e-mail notification when new information is available in [address] 9. Click Sign Up. You can now view and download portions of your medical record. 10. Click the Download Summary menu link to download a portable copy of your medical information. If you have questions, please visit the Frequently Asked Questions section of the [employer] website. Remember, [employer] is NOT to be used for urgent needs. For medical emergencies, dial 911. Now available from your iPhone and Android! Please provide this summary of care documentation to your next provider. Your primary care clinician is listed as Dejan Frederick. If you have any questions after today's visit, please call [phone].

## 2018-01-16 NOTE — PROGRESS NOTES
[facility]               Karl Simpson MD        [address] 78 [phone] Fax [phone]               Patient Information  Date:1/16/2018  Name : Sundeep Sharp 40 y.o.     YOB: 1980           Chief Complaint   Patient presents with    Diabetes     pregnant       History of Present Illness: Sundeep Sharp is a 40 y.o. female here for fu of  Type 2 Diabetes Mellitus. Perinatalogist- Dr Shimon Tellez, VPW    Reviewed the log, she is having hypoglycemia. She titrated insulin from 18 units to 30 units of Humalog. Hypoglycemia mostly when she is working, not on nonworking days  Twin pregnancy , C -section scheduled on Feb 15 th   She is on NPH at night . Reviewed the log - not checking as recommended  fastings 80 - 100, last 1-2 weeks a few of the morning blood glucose in the 60s    She was diagnosed with diabetes in February 2016. Wt Readings from Last 3 Encounters:   12/19/17 269 lb 3.2 oz (122.1 kg)   11/21/17 262 lb 11.2 oz (119.2 kg)   10/24/17 255 lb 11.2 oz (116 kg)       BP Readings from Last 3 Encounters:   12/19/17 104/54   11/21/17 113/49   10/24/17 122/63           Past Medical History:   Diagnosis Date    Carpal tunnel syndrome     Diabetes (HCC)      Current Outpatient Prescriptions   Medication Sig    metFORMIN (GLUCOPHAGE) 1,000 mg tablet TAKE ONE TABLET BY MOUTH TWICE DAILY WITH MEALS    labetalol (NORMODYNE) 200 mg tablet Take 200 mg by mouth two (2) times a day.  methyldopa (ALDOMET) 500 mg tablet Take 1 Tab by mouth three (3) times daily.  insulin NPH (NOVOLIN N) 100 unit/mL injection Inject 40 units q HS. Stop Humulin N & Levemir    insulin regular (NOVOLIN R) 100 unit/mL injection Inject 18 units before each meal with SSI.  Max units daily: 69    insulin syringe-needle U-100 (BD INSULIN SYRINGE ULTRA-FINE) 1 mL 31 gauge x 15/64\" syrg 1 Syringe by SubCUTAneous route four (4) times daily. DX code O24.311    PNV38-Iron Cbn&Gluc-FA-DSS-DHA 35-1- mg cmpk Take  by mouth.  aspirin 81 mg chewable tablet Take 81 mg by mouth daily.  glucose blood VI test strips (ONETOUCH ULTRA TEST) strip Test blood glucose 6 times daily Dx Code: O24.414    Lancets misc Test blood glucose 6 times daily Dx Code: O24.414    Insulin Needles, Disposable, (JOSE ALBERTO PEN NEEDLE) 32 gauge x 5/32\" ndle With insulin    multivitamin (ONE A DAY) tablet Take 1 Tab by mouth daily.  clotrimazole-betamethasone (LOTRISONE) topical cream Twice daily to hand    loratadine (CLARITIN) 10 mg tablet Take 10 mg by mouth.  cholecalciferol (VITAMIN D3) 1,000 unit cap Take 1,000 Units by mouth three (3) times daily.  pyridoxine, vitamin B6, (VITAMIN B-6) 100 mg tablet Take 100 mg by mouth daily.  cyanocobalamin (VITAMIN B12) 500 mcg tablet Take 500 mcg by mouth daily.  ASCORBATE CALCIUM (VITAMIN C PO) Take 1,000 mg by mouth daily. No current facility-administered medications for this visit. No Known Allergies      Review of Systems:  - Constitutional Symptoms: no fevers, no chills,   - Eyes: no blurry vision no double vision  - Cardiovascular: no chest pain ,no palpitations  - Respiratory: no cough no shortness of breath  - Gastrointestinal: no dysphagia no  abdominal pain  - Psychiatric: no depression no  anxiety  - Endocrine: no heat or cold intolerance    Physical Examination:   Height 5' 4\" (1.626 m). Estimated body mass index is 46.21 kg/(m^2) as calculated from the following:    Height as of 12/19/17: 5' 4\" (1.626 m). -   Weight as of 12/19/17: 269 lb 3.2 oz (122.1 kg).   General: pleasant, no distress,   HEENT: no pallor, no periorbital edema, EOMI  Neck: supple,   Cardiovascular: regular, normal rate, normal S1 and S2  Respiratory: clear to auscultation bilaterally  Neurological:alert and oriented  Psychiatric: normal mood and affect  - Skin: color, texture, turgor normal.       Data Reviewed:     [] Glucose records reviewed. [] See glucose records for details (to be scanned). [] A1C  [] Reviewed labs      Assessment/Plan:     1. Pre-existing diabetes mellitus affecting pregnancy in third trimester, antepartum    2. Type 2 diabetes mellitus with hyperglycemia, with long-term current use of insulin (HCC)    3. Essential hypertension    4. Hirsutism        1. Type 2 Diabetes Mellitus   Lab Results   Component Value Date/Time    Hemoglobin A1c 6.3 05/09/2017 10:05 AM    Hemoglobin A1c (POC) 5.3 12/19/2017 09:22 AM    Hemoglobin A1c, External 12.6 02/03/2016     NPH 40 units at bedtime. Continue Metformin. Has insulin resistance. Humalog decreased to 26 units before meals   Eye exam - October 2017 ,   Weekly log to be sent, to call if she has more than 2 blood glucose less than 70    FLU annually ,Pneumovax ,aspirin daily,annual eye exam,microalbumin    2. Hirsutism - had oligomenorrhea , metformin has helped _PCOS  LMP Jan 2017       3 HTN -Methyldopa    4. Obesity:There is no height or weight on file to calculate BMI. Discussed about the importance of exercise and carbohydrate portion control. There are no Patient Instructions on file for this visit. Follow-up Disposition: Not on File    Thank you for allowing me to participate in the care of this patient.     Yg Cabello MD      Patient verbalized understanding

## 2018-01-17 DIAGNOSIS — E11.65 TYPE 2 DIABETES MELLITUS WITH HYPERGLYCEMIA, WITH LONG-TERM CURRENT USE OF INSULIN (HCC): ICD-10-CM

## 2018-01-17 DIAGNOSIS — O24.311 PRE-EXISTING DIABETES MELLITUS IN PREGNANCY IN FIRST TRIMESTER: ICD-10-CM

## 2018-01-17 DIAGNOSIS — Z79.4 TYPE 2 DIABETES MELLITUS WITH HYPERGLYCEMIA, WITH LONG-TERM CURRENT USE OF INSULIN (HCC): ICD-10-CM

## 2018-01-17 NOTE — TELEPHONE ENCOUNTER
----- Message from Vicky Gonsalves sent at 1/17/2018 12:30 PM EST -----  Regarding: Dr. Pedro Lucas is calling for clarification for pt's insulin. The best contact is [phone].

## 2018-01-25 ENCOUNTER — ED HISTORICAL/CONVERTED ENCOUNTER (OUTPATIENT)
Dept: OTHER | Age: 38
End: 2018-01-25

## 2018-02-01 ENCOUNTER — TELEPHONE (OUTPATIENT)
Dept: ENDOCRINOLOGY | Age: 38
End: 2018-02-01

## 2018-02-01 NOTE — TELEPHONE ENCOUNTER
Called the number - mother answered , Ms Alan Collins went to work after glucose was back to normal.   No ER visit     Will call Ms Alan Collins

## 2018-02-01 NOTE — TELEPHONE ENCOUNTER
Per Miri Jon LPN, pt's mother called stating pt's BG is dropping. Nurse advised pt's mother to call ambulance and have pt taken to the ER. Mother verbalized understanding.

## 2018-02-06 ENCOUNTER — OFFICE VISIT (OUTPATIENT)
Dept: ENDOCRINOLOGY | Age: 38
End: 2018-02-06

## 2018-02-06 VITALS
RESPIRATION RATE: 18 BRPM | DIASTOLIC BLOOD PRESSURE: 73 MMHG | BODY MASS INDEX: 49.32 KG/M2 | TEMPERATURE: 97 F | SYSTOLIC BLOOD PRESSURE: 142 MMHG | HEIGHT: 64 IN | HEART RATE: 73 BPM | OXYGEN SATURATION: 99 % | WEIGHT: 288.9 LBS

## 2018-02-06 DIAGNOSIS — E16.2 HYPOGLYCEMIA: ICD-10-CM

## 2018-02-06 DIAGNOSIS — O24.313 PRE-EXISTING DIABETES MELLITUS DURING PREGNANCY IN THIRD TRIMESTER: ICD-10-CM

## 2018-02-06 DIAGNOSIS — Z79.4 TYPE 2 DIABETES MELLITUS WITH HYPERGLYCEMIA, WITH LONG-TERM CURRENT USE OF INSULIN (HCC): Primary | ICD-10-CM

## 2018-02-06 DIAGNOSIS — E11.65 TYPE 2 DIABETES MELLITUS WITH HYPERGLYCEMIA, WITH LONG-TERM CURRENT USE OF INSULIN (HCC): Primary | ICD-10-CM

## 2018-02-06 NOTE — PROGRESS NOTES
Wt Readings from Last 3 Encounters:   02/06/18 288 lb 14.4 oz (131 kg)   01/16/18 283 lb 8 oz (128.6 kg)   12/19/17 269 lb 3.2 oz (122.1 kg)     Temp Readings from Last 3 Encounters:   02/06/18 97 °F (36.1 °C) (Oral)   01/16/18 97.7 °F (36.5 °C) (Oral)   12/19/17 96.7 °F (35.9 °C) (Oral)     BP Readings from Last 3 Encounters:   02/06/18 142/73   01/16/18 113/49   12/19/17 104/54     Pulse Readings from Last 3 Encounters:   02/06/18 73   01/16/18 75   12/19/17 85     Lab Results   Component Value Date/Time    Hemoglobin A1c 6.3 05/09/2017 10:05 AM    Hemoglobin A1c (POC) 5.2 01/16/2018 09:19 AM    Hemoglobin A1c, External 12.6 02/03/2016   1. Have you been to the ER, urgent care clinic since your last visit? Hospitalized since your last visit? No    2. Have you seen or consulted any other health care providers outside of the [address] [place] [address] since your last visit? Include any pap smears or colon screening.  No

## 2018-02-06 NOTE — PATIENT INSTRUCTIONS
Goals for blood sugar:  - fasting: less than 90 - 95  - 2 hours after a meal: less than 120    Check blood sugars immediately before breakfast  , 2 hour after the meals     Novolin N  ( cloudy )  insulin 30 units at bed time, if you have low sugars in the middle of the night then decrease Novolin N to 26 units     Humalog or Novolin R  18  units before each meal       Additional Humalog or Novolin R  for high sugars    Blood sugar  insulin        120-150  Add 2 units       150-200  Add 4 Units       201-250  Add 6 Units             The night before delivery - take the full dose of Novolin N even if you are not eating after midnight     After delivery continue metformin , the goals ate before meals < 120 , 2 hours after meals < 160    Follow up in 6 weeks

## 2018-02-06 NOTE — PROGRESS NOTES
[facility]               Aislinn Taylor MD        [address] 78 [phone] Fax [phone]               Patient Information  Date:2018  Name : Nj Mcgrath 40 y.o.     YOB: 1980           Chief Complaint   Patient presents with    Gestational Diabetes       History of Present Illness: Nj Mcgrath is a 40 y.o. female here for fu of  Type 2 Diabetes Mellitus. Perinatalogist- Dr Triston Delaney, VPW    Reviewed the log,   She is scheduled to have  next week. She is off work. 2 episodes of hypoglycemia on weekends. Eating less  Twin pregnancy , C -section scheduled on Feb 15 th   She is on NPH at night . She was diagnosed with diabetes in 2016. Wt Readings from Last 3 Encounters:   18 288 lb 14.4 oz (131 kg)   18 283 lb 8 oz (128.6 kg)   17 269 lb 3.2 oz (122.1 kg)       BP Readings from Last 3 Encounters:   18 142/73   18 113/49   17 104/54           Past Medical History:   Diagnosis Date    Carpal tunnel syndrome     Diabetes (HCC)      Current Outpatient Prescriptions   Medication Sig    insulin regular (NOVOLIN R) 100 unit/mL injection Inject 18 units before each meal with SSI. Max units daily: 105    metFORMIN (GLUCOPHAGE) 1,000 mg tablet TAKE ONE TABLET BY MOUTH TWICE DAILY WITH MEALS    labetalol (NORMODYNE) 200 mg tablet Take 200 mg by mouth two (2) times a day.  methyldopa (ALDOMET) 500 mg tablet Take 1 Tab by mouth three (3) times daily.  insulin NPH (NOVOLIN N) 100 unit/mL injection Inject 40 units q HS. Stop Humulin N & Levemir (Patient taking differently: Inject 32 units q HS. Stop Humulin N & Levemir)    insulin syringe-needle U-100 (BD INSULIN SYRINGE ULTRA-FINE) 1 mL 31 gauge x 15/64\" syrg 1 Syringe by SubCUTAneous route four (4) times daily.  DX code O24.311    PNV38-Iron Cbn&Gluc-FA-DSS-DHA 35-1- mg cmpk Take  by mouth.  aspirin 81 mg chewable tablet Take 81 mg by mouth daily.  glucose blood VI test strips (ONETOUCH ULTRA TEST) strip Test blood glucose 6 times daily Dx Code: O24.414    Lancets misc Test blood glucose 6 times daily Dx Code: O24.414    Insulin Needles, Disposable, (JOSE ALBERTO PEN NEEDLE) 32 gauge x 5/32\" ndle With insulin    multivitamin (ONE A DAY) tablet Take 1 Tab by mouth daily.  clotrimazole-betamethasone (LOTRISONE) topical cream Twice daily to hand    loratadine (CLARITIN) 10 mg tablet Take 10 mg by mouth.  cholecalciferol (VITAMIN D3) 1,000 unit cap Take 1,000 Units by mouth three (3) times daily.  pyridoxine, vitamin B6, (VITAMIN B-6) 100 mg tablet Take 100 mg by mouth daily.  cyanocobalamin (VITAMIN B12) 500 mcg tablet Take 500 mcg by mouth daily.  ASCORBATE CALCIUM (VITAMIN C PO) Take 1,000 mg by mouth daily. No current facility-administered medications for this visit. No Known Allergies      Review of Systems:  - Constitutional Symptoms: no fevers, no chills,   - Eyes: no blurry vision no double vision  - Cardiovascular: no chest pain ,no palpitations  - Respiratory: no cough no shortness of breath  - Gastrointestinal: no dysphagia no  abdominal pain  - Psychiatric: no depression no  anxiety  - Endocrine: no heat or cold intolerance    Physical Examination:   Blood pressure 142/73, pulse 73, temperature 97 °F (36.1 °C), temperature source Oral, resp. rate 18, height 5' 4\" (1.626 m), weight 288 lb 14.4 oz (131 kg), SpO2 99 %. Estimated body mass index is 49.59 kg/(m^2) as calculated from the following:    Height as of this encounter: 5' 4\" (1.626 m). -   Weight as of this encounter: 288 lb 14.4 oz (131 kg).   General: pleasant, no distress,   HEENT: no pallor, no periorbital edema, EOMI  Neck: supple,   Cardiovascular: regular, normal rate, normal S1 and S2  Respiratory: clear to auscultation bilaterally  Neurological:alert and oriented  Psychiatric: normal mood and affect  - Skin: color, texture, turgor normal.     Diabetic foot exam: February 2018    Left:     Vibratory sensation normal    Filament test normal sensation with micro filament   Pulse DP: 1+    Deformities: None  Right:    Vibratory sensation normal   Filament test normal sensation with micro filament   Pulse DP: 1+   Deformities: None      Data Reviewed:     [] Glucose records reviewed. [] See glucose records for details (to be scanned). [] A1C  [] Reviewed labs      Assessment/Plan:     1. Type 2 diabetes mellitus with hyperglycemia, with long-term current use of insulin ([address]    2. Pre-existing diabetes mellitus affecting pregnancy in second trimester, antepartum    3. Pre-existing diabetes mellitus in pregnancy in first trimester        1. Type 2 Diabetes Mellitus   Lab Results   Component Value Date/Time    Hemoglobin A1c 6.3 (H) 05/09/2017 10:05 AM    Hemoglobin A1c (POC) 5.2 01/16/2018 09:19 AM    Hemoglobin A1c, External 12.6 02/03/2016     NPH 30 units at bedtime. Continue Metformin. Has insulin resistance. Humalog decreased to 18 units before meals   Eye exam - October 2017 ,   Goals of the blood glucose during pregnancy discussed    FLU annually ,Pneumovax ,aspirin daily,annual eye exam,microalbumin    2. Hirsutism - had oligomenorrhea , metformin has helped _PCOS  LMP Jan 2017       3 HTN -Methyldopa    4. Obesity:Body mass index is 49.59 kg/(m^2). Discussed about the importance of exercise and carbohydrate portion control. Follow-up Disposition:  Return in about 3 months (around 5/6/2018). Thank you for allowing me to participate in the care of this patient.     Beto Truong MD      Patient verbalized understanding

## 2018-02-09 DIAGNOSIS — E11.65 TYPE 2 DIABETES MELLITUS WITH HYPERGLYCEMIA, WITH LONG-TERM CURRENT USE OF INSULIN (HCC): ICD-10-CM

## 2018-02-09 DIAGNOSIS — Z79.4 TYPE 2 DIABETES MELLITUS WITH HYPERGLYCEMIA, WITH LONG-TERM CURRENT USE OF INSULIN (HCC): ICD-10-CM

## 2018-02-09 RX ORDER — HUMAN INSULIN 100 [IU]/ML
INJECTION, SUSPENSION SUBCUTANEOUS
Qty: 10 ML | Refills: 1 | Status: SHIPPED | OUTPATIENT
Start: 2018-02-09 | End: 2018-05-17

## 2018-02-25 DIAGNOSIS — L30.9 DERMATITIS: ICD-10-CM

## 2018-02-25 DIAGNOSIS — E11.00 UNCONTROLLED TYPE 2 DIABETES MELLITUS WITH HYPEROSMOLARITY WITHOUT COMA, WITHOUT LONG-TERM CURRENT USE OF INSULIN (HCC): ICD-10-CM

## 2018-02-27 RX ORDER — LINAGLIPTIN 5 MG/1
TABLET, FILM COATED ORAL
Qty: 30 TAB | Refills: 11 | Status: SHIPPED | OUTPATIENT
Start: 2018-02-27 | End: 2018-05-17 | Stop reason: SDUPTHER

## 2018-04-12 ENCOUNTER — TELEPHONE (OUTPATIENT)
Dept: ENDOCRINOLOGY | Age: 38
End: 2018-04-12

## 2018-04-12 NOTE — TELEPHONE ENCOUNTER
Attempted to call patient to inform her PA was completed and approved on [employer]. Voice mail stated name. Left message and call back number with any further questions.

## 2018-05-09 DIAGNOSIS — E11.65 UNCONTROLLED TYPE 2 DIABETES MELLITUS WITH HYPERGLYCEMIA, WITH LONG-TERM CURRENT USE OF INSULIN (HCC): ICD-10-CM

## 2018-05-09 DIAGNOSIS — Z79.4 UNCONTROLLED TYPE 2 DIABETES MELLITUS WITH HYPERGLYCEMIA, WITH LONG-TERM CURRENT USE OF INSULIN (HCC): ICD-10-CM

## 2018-05-09 DIAGNOSIS — L68.0 HIRSUTISM: ICD-10-CM

## 2018-05-09 RX ORDER — METFORMIN HYDROCHLORIDE 1000 MG/1
TABLET ORAL
Qty: 180 TAB | Refills: 3 | Status: SHIPPED | OUTPATIENT
Start: 2018-05-09 | End: 2018-05-17 | Stop reason: SDUPTHER

## 2018-05-09 NOTE — TELEPHONE ENCOUNTER
----- Message from [address] [place] [address] sent at 5/9/2018 12:24 PM EDT -----  Regarding: Dr. Huertas Portal / refill   Patient is requesting refill for Rx\" Metformin\". Patient is completely out of medication. Send to [facility],  [phone].    Best contact [phone]

## 2018-05-17 ENCOUNTER — OFFICE VISIT (OUTPATIENT)
Dept: ENDOCRINOLOGY | Age: 38
End: 2018-05-17

## 2018-05-17 VITALS
DIASTOLIC BLOOD PRESSURE: 84 MMHG | TEMPERATURE: 97.1 F | SYSTOLIC BLOOD PRESSURE: 155 MMHG | WEIGHT: 261.1 LBS | HEIGHT: 64 IN | BODY MASS INDEX: 44.57 KG/M2 | OXYGEN SATURATION: 99 % | RESPIRATION RATE: 16 BRPM | HEART RATE: 70 BPM

## 2018-05-17 DIAGNOSIS — I10 ESSENTIAL HYPERTENSION: ICD-10-CM

## 2018-05-17 DIAGNOSIS — L30.9 DERMATITIS: ICD-10-CM

## 2018-05-17 DIAGNOSIS — L68.0 HIRSUTISM: ICD-10-CM

## 2018-05-17 DIAGNOSIS — E11.65 UNCONTROLLED TYPE 2 DIABETES MELLITUS WITH HYPERGLYCEMIA, WITH LONG-TERM CURRENT USE OF INSULIN (HCC): ICD-10-CM

## 2018-05-17 DIAGNOSIS — E11.00 UNCONTROLLED TYPE 2 DIABETES MELLITUS WITH HYPEROSMOLARITY WITHOUT COMA, WITHOUT LONG-TERM CURRENT USE OF INSULIN (HCC): ICD-10-CM

## 2018-05-17 DIAGNOSIS — E11.65 TYPE 2 DIABETES MELLITUS WITH HYPERGLYCEMIA, WITHOUT LONG-TERM CURRENT USE OF INSULIN (HCC): Primary | ICD-10-CM

## 2018-05-17 DIAGNOSIS — Z79.4 UNCONTROLLED TYPE 2 DIABETES MELLITUS WITH HYPERGLYCEMIA, WITH LONG-TERM CURRENT USE OF INSULIN (HCC): ICD-10-CM

## 2018-05-17 RX ORDER — METFORMIN HYDROCHLORIDE 1000 MG/1
TABLET ORAL
Qty: 180 TAB | Refills: 3 | Status: SHIPPED | OUTPATIENT
Start: 2018-05-17 | End: 2019-08-29 | Stop reason: SDUPTHER

## 2018-05-17 RX ORDER — LABETALOL 200 MG/1
200 TABLET, FILM COATED ORAL 2 TIMES DAILY
Qty: 180 TAB | Refills: 3 | Status: SHIPPED | OUTPATIENT
Start: 2018-05-17 | End: 2019-06-02 | Stop reason: SDUPTHER

## 2018-05-17 NOTE — LETTER
5/19/2018 11:29 AM 
 
Patient:  Anastasiia Pate YOB: 1980 Date of Visit: 5/17/2018 Dear HALEIGH [address] [address] VIA Facsimile: [phone] 
 : Thank you for referring Ms. Anastasiia Pate to me for evaluation/treatment. Below are the relevant portions of my assessment and plan of care. If you have questions, please do not hesitate to call me. I look forward to following Ms. Elida Gomez along with you. Sincerely, Sophia Camarillo MD

## 2018-05-17 NOTE — PROGRESS NOTES
[facility]               Casper Colvin MD        [address] 78 [phone] Fax [phone]               Patient Information  Date:5/19/2018  Name : Nikki Motley 40 y.o.     YOB: 1980           Chief Complaint   Patient presents with    Diabetes       History of Present Illness: Nikki Motley is a 40 y.o. female here for fu of  Type 2 Diabetes Mellitus. She delivered twins, babies are healthy  Off insulin  Not checking the blood glucose  No hypoglycemia  On oral medications      She was diagnosed with diabetes in February 2016. Wt Readings from Last 3 Encounters:   05/17/18 261 lb 1.6 oz (118.4 kg)   02/06/18 288 lb 14.4 oz (131 kg)   01/16/18 283 lb 8 oz (128.6 kg)       BP Readings from Last 3 Encounters:   05/17/18 155/84   02/06/18 142/73   01/16/18 113/49           Past Medical History:   Diagnosis Date    Carpal tunnel syndrome     Diabetes (HCC)      Current Outpatient Prescriptions   Medication Sig    aspirin 81 mg chewable tablet Take 81 mg by mouth daily.  glucose blood VI test strips (ONETOUCH ULTRA TEST) strip Test blood glucose 6 times daily Dx Code: O24.414    Lancets misc Test blood glucose 6 times daily Dx Code: O24.414    Insulin Needles, Disposable, (JOSE ALBERTO PEN NEEDLE) 32 gauge x 5/32\" ndle With insulin    multivitamin (ONE A DAY) tablet Take 1 Tab by mouth daily.  loratadine (CLARITIN) 10 mg tablet Take 10 mg by mouth.  metFORMIN (GLUCOPHAGE) 1,000 mg tablet TAKE ONE TABLET BY MOUTH TWICE DAILY WITH MEALS    linagliptin (TRADJENTA) 5 mg tablet TAKE ONE TABLET BY MOUTH ONCE DAILY    labetalol (NORMODYNE) 200 mg tablet Take 1 Tab by mouth two (2) times a day.  clotrimazole-betamethasone (LOTRISONE) topical cream Twice daily to hand    cholecalciferol (VITAMIN D3) 1,000 unit cap Take 1,000 Units by mouth three (3) times daily.     pyridoxine, vitamin B6, (VITAMIN B-6) 100 mg tablet Take 100 mg by mouth daily.  cyanocobalamin (VITAMIN B12) 500 mcg tablet Take 500 mcg by mouth daily.  ASCORBATE CALCIUM (VITAMIN C PO) Take 1,000 mg by mouth daily. No current facility-administered medications for this visit. No Known Allergies      Review of Systems:  - Constitutional Symptoms: no fevers, no chills,   - Eyes: no blurry vision no double vision  - Cardiovascular: no chest pain ,no palpitations  - Respiratory: no cough no shortness of breath  - Gastrointestinal: no dysphagia no  abdominal pain  - Psychiatric: no depression no  anxiety  - Endocrine: no heat or cold intolerance    Physical Examination:   Blood pressure 155/84, pulse 70, temperature 97.1 °F (36.2 °C), temperature source Oral, resp. rate 16, height 5' 4\" (1.626 m), weight 261 lb 1.6 oz (118.4 kg), SpO2 99 %, unknown if currently breastfeeding. Estimated body mass index is 44.82 kg/(m^2) as calculated from the following:    Height as of this encounter: 5' 4\" (1.626 m). -   Weight as of this encounter: 261 lb 1.6 oz (118.4 kg). General: pleasant, no distress,   HEENT: no pallor, no periorbital edema, EOMI  Neck: supple,   Cardiovascular: regular, normal rate, normal S1 and S2  Respiratory: clear to auscultation bilaterally  Neurological:alert and oriented  Psychiatric: normal mood and affect  - Skin: color, texture, turgor normal.     Diabetic foot exam: February 2018    Left:     Vibratory sensation normal    Filament test normal sensation with micro filament   Pulse DP: 1+    Deformities: None  Right:    Vibratory sensation normal   Filament test normal sensation with micro filament   Pulse DP: 1+   Deformities: None      Data Reviewed:     [] Glucose records reviewed. [] See glucose records for details (to be scanned). [] A1C  [] Reviewed labs      Assessment/Plan:     1. Type 2 diabetes mellitus with hyperglycemia, without long-term current use of insulin (HCC)        1.  Type 2 Diabetes Mellitus   Lab Results   Component Value Date/Time    Hemoglobin A1c 5.8 (H) 05/08/2018 09:53 AM    Hemoglobin A1c (POC) 5.2 01/16/2018 09:19 AM    Hemoglobin A1c, External 12.6 02/03/2016     Metformin, Tradjenta  Not breast-feeding    FLU annually ,Pneumovax ,aspirin daily,annual eye exam,microalbumin    2. Hirsutism - had oligomenorrhea , metformin has helped _PCOS  LMP Jan 2017       3 HTN -labetalol    4. Obesity:Body mass index is 44.82 kg/(m^2). Discussed about the importance of exercise and carbohydrate portion control. Follow-up Disposition:  Return in about 5 months (around 10/17/2018). Thank you for allowing me to participate in the care of this patient.     Tianna Kelly MD      Patient verbalized understanding

## 2018-05-17 NOTE — MR AVS SNAPSHOT
[address] [facility] [address] [place] [address] 
[phone] Patient: Samuel Tanner MRN: [id number] LPY:[birth year] Visit Information Date & Time Provider Department Dept. Phone Encounter #  
 5/17/2018 11:15 AM Brian Avalos MD [facility] [phone] [id number] Follow-up Instructions Return in about 5 months (around 10/17/2018). Upcoming Health Maintenance Date Due Pneumococcal 19-64 Medium Risk (1 of 1 - PPSV23) 5/25/2000 DTaP/Tdap/Td series (1 - Tdap) 5/25/2002 PAP AKA CERVICAL CYTOLOGY 5/25/2002 Influenza Age 5 to Adult 8/1/2018 EYE EXAM RETINAL OR DILATED Q1 11/2/2018 HEMOGLOBIN A1C Q6M 11/8/2018 FOOT EXAM Q1 2/6/2019 MICROALBUMIN Q1 5/8/2019 LIPID PANEL Q1 5/8/2019 Allergies as of 5/17/2018  Review Complete On: 2/6/2018 By: Brian Avalos MD  
 No Known Allergies Current Immunizations  Never Reviewed No immunizations on file. Not reviewed this visit You Were Diagnosed With   
  
 Codes Comments Type 2 diabetes mellitus with hyperglycemia, without long-term current use of insulin (HCC)    -  Primary ICD-10-CM: E11.65 ICD-9-CM: 250.00, 790.29 Vitals BP Pulse Temp Resp Height(growth percentile) Weight(growth percentile) 155/84 (BP 1 Location: Left arm, BP Patient Position: Sitting) 70 97.1 °F (36.2 °C) (Oral) 16 5' 4\" (1.626 m) 261 lb 1.6 oz (118.4 kg) SpO2 Breastfeeding? BMI OB Status Smoking Status 99% Unknown 44.82 kg/m2 Recent pregnancy Former Smoker BMI and BSA Data Body Mass Index Body Surface Area 44.82 kg/m 2 2.31 m 2 Preferred Pharmacy Pharmacy Name Phone [facility], [address] [place] [address] [phone] Your Updated Medication List  
  
   
This list is accurate as of 5/17/18 11:41 AM.  Always use your most recent med list.  
  
  
  
  
 aspirin 81 mg chewable tablet Take 81 mg by mouth daily. cholecalciferol 1,000 unit Cap Commonly known as:  VITAMIN D3 Take 1,000 Units by mouth three (3) times daily. CLARITIN 10 mg tablet Generic drug:  loratadine Take 10 mg by mouth. clotrimazole-betamethasone topical cream  
Commonly known as:  Leonardo Sand Twice daily to hand  
  
 cyanocobalamin 500 mcg tablet Commonly known as:  VITAMIN B12 Take 500 mcg by mouth daily. glucose blood VI test strips strip Commonly known as:  ONETOUCH ULTRA TEST Test blood glucose 6 times daily Dx Code: V88.912 Insulin Needles (Disposable) 32 gauge x 5/32\" Ndle Commonly known as:  Andree Pen Needle With insulin  
  
 labetalol 200 mg tablet Commonly known as:  Nomi Theron Take 200 mg by mouth two (2) times a day. Lancets Misc Test blood glucose 6 times daily Dx Code: O24.414  
  
 metFORMIN 1,000 mg tablet Commonly known as:  GLUCOPHAGE  
TAKE ONE TABLET BY MOUTH TWICE DAILY WITH MEALS  
  
 methyldopa 500 mg tablet Commonly known as:  ALDOMET Take 1 Tab by mouth three (3) times daily. multivitamin tablet Commonly known as:  ONE A DAY Take 1 Tab by mouth daily. pyridoxine (vitamin B6) 100 mg tablet Commonly known as:  VITAMIN B-6 Take 100 mg by mouth daily. TRADJENTA 5 mg tablet Generic drug:  linagliptin TAKE ONE TABLET BY MOUTH ONCE DAILY  
  
 VITAMIN C PO Take 1,000 mg by mouth daily. Follow-up Instructions Return in about 5 months (around 10/17/2018). Introducing [facility]! [facility] introduces [employer] patient portal. Now you can access parts of your medical record, email your doctor's office, and request medication refills online. 1. In your internet browser, go to [email or web address] "[email or web address] 2. Click on the First Time User? Click Here link in the Sign In box. You will see the New Member Sign Up page. 3. Enter your Familiar Access Code exactly as it appears below. You will not need to use this code after youve completed the sign-up process. If you do not sign up before the expiration date, you must request a new code. · Familiar Access Code: [email or web address] Expires: 8/15/2018 11:41 AM 
 
4. Enter the last four digits of your Social Security Number (xxxx) and Date of Birth (mm/dd/yyyy) as indicated and click Submit. You will be taken to the next sign-up page. 5. Create a Familiar ID. This will be your Familiar login ID and cannot be changed, so think of one that is secure and easy to remember. 6. Create a Familiar password. You can change your password at any time. 7. Enter your Password Reset Question and Answer. This can be used at a later time if you forget your password. 8. Enter your e-mail address. You will receive e-mail notification when new information is available in [address] 9. Click Sign Up. You can now view and download portions of your medical record. 10. Click the Download Summary menu link to download a portable copy of your medical information. If you have questions, please visit the Frequently Asked Questions section of the [employer]amiliar website. Remember, Familiar is NOT to be used for urgent needs. For medical emergencies, dial 911. Now available from your iPhone and Android! Please provide this summary of care documentation to your next provider. Your primary care clinician is listed as Onofre Jones. If you have any questions after today's visit, please call [phone].

## 2018-05-17 NOTE — PROGRESS NOTES
Idania Salcido is a 40 y.o. female here for   Chief Complaint   Patient presents with    Diabetes         Lab Results   Component Value Date/Time    Hemoglobin A1c 5.8 (H) 05/08/2018 09:53 AM    Hemoglobin A1c (POC) 5.2 01/16/2018 09:19 AM    Hemoglobin A1c, External 12.6 02/03/2016       Wt Readings from Last 3 Encounters:   05/17/18 261 lb 1.6 oz (118.4 kg)   02/06/18 288 lb 14.4 oz (131 kg)   01/16/18 283 lb 8 oz (128.6 kg)     Temp Readings from Last 3 Encounters:   05/17/18 97.1 °F (36.2 °C) (Oral)   02/06/18 97 °F (36.1 °C) (Oral)   01/16/18 97.7 °F (36.5 °C) (Oral)     BP Readings from Last 3 Encounters:   05/17/18 155/84   02/06/18 142/73   01/16/18 113/49     Pulse Readings from Last 3 Encounters:   05/17/18 70   02/06/18 73   01/16/18 75

## 2018-05-19 PROBLEM — O24.311 PRE-EXISTING DIABETES MELLITUS DURING PREGNANCY IN FIRST TRIMESTER: Status: RESOLVED | Noted: 2017-06-30 | Resolved: 2018-05-19

## 2018-05-19 PROBLEM — O24.312 PRE-EXISTING DIABETES MELLITUS AFFECTING PREGNANCY IN SECOND TRIMESTER, ANTEPARTUM: Status: RESOLVED | Noted: 2017-10-24 | Resolved: 2018-05-19

## 2018-05-19 PROBLEM — O24.313 PRE-EXISTING DIABETES MELLITUS DURING PREGNANCY IN THIRD TRIMESTER: Status: RESOLVED | Noted: 2018-02-06 | Resolved: 2018-05-19

## 2018-05-19 RX ORDER — LOSARTAN POTASSIUM 50 MG/1
50 TABLET ORAL DAILY
Qty: 30 TAB | Refills: 4 | Status: SHIPPED | OUTPATIENT
Start: 2018-05-19 | End: 2018-10-17 | Stop reason: SDUPTHER

## 2018-06-09 ENCOUNTER — ED HISTORICAL/CONVERTED ENCOUNTER (OUTPATIENT)
Dept: OTHER | Age: 38
End: 2018-06-09

## 2018-10-17 ENCOUNTER — OFFICE VISIT (OUTPATIENT)
Dept: ENDOCRINOLOGY | Age: 38
End: 2018-10-17

## 2018-10-17 VITALS
TEMPERATURE: 97.6 F | BODY MASS INDEX: 43.42 KG/M2 | RESPIRATION RATE: 18 BRPM | WEIGHT: 254.3 LBS | HEART RATE: 58 BPM | OXYGEN SATURATION: 99 % | DIASTOLIC BLOOD PRESSURE: 86 MMHG | HEIGHT: 64 IN | SYSTOLIC BLOOD PRESSURE: 147 MMHG

## 2018-10-17 DIAGNOSIS — E11.65 TYPE 2 DIABETES MELLITUS WITH HYPERGLYCEMIA, WITHOUT LONG-TERM CURRENT USE OF INSULIN (HCC): ICD-10-CM

## 2018-10-17 DIAGNOSIS — L68.0 HIRSUTISM: ICD-10-CM

## 2018-10-17 DIAGNOSIS — E11.65 TYPE 2 DIABETES MELLITUS WITH HYPERGLYCEMIA, WITHOUT LONG-TERM CURRENT USE OF INSULIN (HCC): Primary | ICD-10-CM

## 2018-10-17 DIAGNOSIS — I10 ESSENTIAL HYPERTENSION: ICD-10-CM

## 2018-10-17 RX ORDER — LOSARTAN POTASSIUM 100 MG/1
100 TABLET ORAL DAILY
Qty: 90 TAB | Refills: 3 | Status: SHIPPED | OUTPATIENT
Start: 2018-10-17 | End: 2019-04-16 | Stop reason: ALTCHOICE

## 2018-10-17 NOTE — PROGRESS NOTES
[facility] Dulce Cabrera MD 
 
    [address] [phone] Fax [phone] Patient Information Date:10/17/2018 Name : Nola Mckinley 41 y.o.    
YOB: 1980 Chief Complaint Patient presents with  Diabetes 5 mo, does not check BG at home History of Present Illness: Nola Mckinley is a 41 y.o. female here for fu of  Type 2 Diabetes Mellitus. She delivered twins, babies are healthy Off insulin Did not bring the meter of the logbook Blood pressure is elevated Compliant with medications No hypoglycemia On oral medications PNA - was on Abx She was diagnosed with diabetes in February 2016. Wt Readings from Last 3 Encounters:  
10/17/18 254 lb 4.8 oz (115.3 kg) 05/17/18 261 lb 1.6 oz (118.4 kg) 02/06/18 288 lb 14.4 oz (131 kg) BP Readings from Last 3 Encounters:  
10/17/18 147/86  
05/17/18 155/84  
02/06/18 142/73 Past Medical History:  
Diagnosis Date  Carpal tunnel syndrome  Diabetes ([facility] [address] Current Outpatient Medications Medication Sig  
 metFORMIN (GLUCOPHAGE) 1,000 mg tablet TAKE ONE TABLET BY MOUTH TWICE DAILY WITH MEALS  linagliptin (TRADJENTA) 5 mg tablet TAKE ONE TABLET BY MOUTH ONCE DAILY  labetalol (NORMODYNE) 200 mg tablet Take 1 Tab by mouth two (2) times a day.  loratadine (CLARITIN) 10 mg tablet Take 10 mg by mouth.  losartan (COZAAR) 50 mg tablet Take 1 Tab by mouth daily. Discontinue methyldopa  Indications: hypertension  aspirin 81 mg chewable tablet Take 81 mg by mouth daily.  glucose blood VI test strips (ONETOUCH ULTRA TEST) strip Test blood glucose 6 times daily Dx Code: X30.670  Lancets misc Test blood glucose 6 times daily Dx Code: O24.414  
 Insulin Needles, Disposable, (JOSE ALBERTO PEN NEEDLE) 32 gauge x 5/32\" ndle With insulin  multivitamin (ONE A DAY) tablet Take 1 Tab by mouth daily.  clotrimazole-betamethasone (LOTRISONE) topical cream Twice daily to hand  cholecalciferol (VITAMIN D3) 1,000 unit cap Take 1,000 Units by mouth three (3) times daily.  pyridoxine, vitamin B6, (VITAMIN B-6) 100 mg tablet Take 100 mg by mouth daily.  cyanocobalamin (VITAMIN B12) 500 mcg tablet Take 500 mcg by mouth daily.  ASCORBATE CALCIUM (VITAMIN C PO) Take 1,000 mg by mouth daily. No current facility-administered medications for this visit. No Known Allergies Review of Systems: 
- Constitutional Symptoms: no fevers, no chills,  
- Eyes: no blurry vision no double vision - Cardiovascular: no chest pain ,no palpitations - Respiratory: no cough no shortness of breath 
- Gastrointestinal: no dysphagia no  abdominal pain - Psychiatric: no depression no  anxiety - Endocrine: no heat or cold intolerance Physical Examination: 
 Blood pressure 147/86, pulse (!) 58, temperature 97.6 °F (36.4 °C), temperature source Oral, resp. rate 18, height 5' 4\" (1.626 m), weight 254 lb 4.8 oz (115.3 kg), last menstrual period 10/09/2018, SpO2 99 %, unknown if currently breastfeeding. Estimated body mass index is 43.65 kg/m² as calculated from the following: 
  Height as of this encounter: 5' 4\" (1.626 m). -   Weight as of this encounter: 254 lb 4.8 oz (115.3 kg). General: pleasant, no distress, HEENT: no pallor, no periorbital edema, EOMI Neck: supple,  
Cardiovascular: regular, normal rate, normal S1 and S2 Respiratory: clear to auscultation bilaterally Neurological:alert and oriented Psychiatric: normal mood and affect 
- Skin: color, texture, turgor normal.  
 
Diabetic foot exam: February 2018 Left:   
 Vibratory sensation normal  
 Filament test normal sensation with micro filament Pulse DP: 1+ Deformities: None Right:  
 Vibratory sensation normal 
 Filament test normal sensation with micro filament Pulse DP: 1+ Deformities: None Data Reviewed: [] Glucose records reviewed. [] See glucose records for details (to be scanned). [] A1C [] Reviewed labs Assessment/Plan:  
 
No diagnosis found. 1. Type 2 Diabetes Mellitus Lab Results Component Value Date/Time Hemoglobin A1c 6.0 (H) 10/10/2018 09:30 AM  
 Hemoglobin A1c (POC) 5.2 01/16/2018 09:19 AM  
 Hemoglobin A1c, External 12.6 02/03/2016 Controlled Metformin, Tradjenta Not breast-feeding FLU annually ,Pneumovax ,aspirin daily,annual eye exam,microalbumin 2. Hirsutism - had oligomenorrhea , metformin has helped _PCOS LMP Jan 2017  
 
 
3 HTN -labetalol, add losartan 4. Obesity:Body mass index is 43.65 kg/m². Discussed about the importance of exercise and carbohydrate portion control. Follow-up Disposition: Not on File Thank you for allowing me to participate in the care of this patient. Doug Monroy MD 
 
 
Patient verbalized understanding

## 2018-10-17 NOTE — PROGRESS NOTES
Chief Complaint Patient presents with  Diabetes 5 mo, does not check BG at home Coordination of Care Questions 1. Have you been to the ER, urgent care clinic since your last visit? No  
    Hospitalized since your last visit? No 
 
2. Have you seen or consulted any other health care providers outside of the [address] [place] [address] since your last visit? Include any pap smears or colon screening.  No

## 2018-10-25 ENCOUNTER — TELEPHONE (OUTPATIENT)
Dept: ENDOCRINOLOGY | Age: 38
End: 2018-10-25

## 2018-10-25 NOTE — TELEPHONE ENCOUNTER
Pt stated she was prescribed losartan 100mg and in the instructions it says to stop 50 mg. She stated she never had the 50 mg and the 100mg is making her dizzy. Informed her that in her records it says that she was on 50 mg and that's why it was increased at her last visit. She stated she was only on the labetalol 200 mg BID and never had the 50 mg. Advised pt to take half of the Losartan 100mg and if it still makes her dizzy d/c. I will make Dr. Nelida Dhaliwal aware and we'll call with further instructions. Pt verbalized understanding with no further questions or concerns at this time.

## 2018-10-26 NOTE — TELEPHONE ENCOUNTER
Informed pt of Dr. Rayne Villalba note. Pt verbalized understanding with no further questions or concerns at this time.

## 2019-03-04 NOTE — PATIENT INSTRUCTIONS
Goals for blood sugar:  - fasting: less than 90 - 95  - 1 hour after a meal : less than 130  - 2 hours after a meal: less than 120    Check blood sugars immediately before breakfast  , 2 hour after the meals     Humulin N   insulin 25 units at bed time Size Of Lesion In Cm (Optional): 0 Detail Level: Detailed

## 2019-04-16 ENCOUNTER — OFFICE VISIT (OUTPATIENT)
Dept: ENDOCRINOLOGY | Age: 39
End: 2019-04-16

## 2019-04-16 VITALS
HEART RATE: 60 BPM | BODY MASS INDEX: 44.73 KG/M2 | TEMPERATURE: 97.3 F | OXYGEN SATURATION: 99 % | RESPIRATION RATE: 14 BRPM | DIASTOLIC BLOOD PRESSURE: 76 MMHG | HEIGHT: 64 IN | SYSTOLIC BLOOD PRESSURE: 151 MMHG | WEIGHT: 262 LBS

## 2019-04-16 DIAGNOSIS — I10 ESSENTIAL HYPERTENSION: ICD-10-CM

## 2019-04-16 DIAGNOSIS — E11.65 TYPE 2 DIABETES MELLITUS WITH HYPERGLYCEMIA, WITH LONG-TERM CURRENT USE OF INSULIN (HCC): Primary | ICD-10-CM

## 2019-04-16 DIAGNOSIS — Z79.4 TYPE 2 DIABETES MELLITUS WITH HYPERGLYCEMIA, WITH LONG-TERM CURRENT USE OF INSULIN (HCC): ICD-10-CM

## 2019-04-16 DIAGNOSIS — E11.65 TYPE 2 DIABETES MELLITUS WITH HYPERGLYCEMIA, WITH LONG-TERM CURRENT USE OF INSULIN (HCC): ICD-10-CM

## 2019-04-16 DIAGNOSIS — L68.0 HIRSUTISM: ICD-10-CM

## 2019-04-16 DIAGNOSIS — I10 ESSENTIAL HYPERTENSION: Primary | ICD-10-CM

## 2019-04-16 DIAGNOSIS — Z79.4 TYPE 2 DIABETES MELLITUS WITH HYPERGLYCEMIA, WITH LONG-TERM CURRENT USE OF INSULIN (HCC): Primary | ICD-10-CM

## 2019-04-16 PROBLEM — G56.00 CARPAL TUNNEL SYNDROME: Status: ACTIVE | Noted: 2019-04-16

## 2019-04-16 LAB
GLUCOSE POC: 110 MG/DL
HBA1C MFR BLD HPLC: 5.8 %

## 2019-04-16 RX ORDER — LOSARTAN POTASSIUM AND HYDROCHLOROTHIAZIDE 25; 100 MG/1; MG/1
1 TABLET ORAL
Qty: 90 TAB | Refills: 3 | Status: SHIPPED | OUTPATIENT
Start: 2019-04-16 | End: 2019-11-05 | Stop reason: SDUPTHER

## 2019-04-16 NOTE — PROGRESS NOTES
[facility]OLOG[facility] Michele Ferrari MD 
 
    [address] [phone] Fax [phone] Patient Information Date:4/16/2019 Name : Joe Chavira 41 y.o.    
YOB: 1980 Chief Complaint Patient presents with  Diabetes  Diabetic Foot Exam  
 
 
History of Present Illness: Joe Chavira is a 41 y.o. female here for fu of  Type 2 Diabetes Mellitus. Gained weight Not checking Blood pressure is elevated Tubal ligation Did not bring the meter of the logbook Compliant with the medications No hypoglycemia PNA - was on Abx She was diagnosed with diabetes in February 2016. Wt Readings from Last 3 Encounters:  
04/16/19 262 lb (118.8 kg) 10/17/18 254 lb 4.8 oz (115.3 kg) 05/17/18 261 lb 1.6 oz (118.4 kg) BP Readings from Last 3 Encounters:  
04/16/19 151/76  
10/17/18 147/86  
05/17/18 155/84 Past Medical History:  
Diagnosis Date  Carpal tunnel syndrome  Diabetes ([address] Current Outpatient Medications Medication Sig  
 losartan (COZAAR) 100 mg tablet Take 1 Tab by mouth daily. Stop 50 mg  
 metFORMIN (GLUCOPHAGE) 1,000 mg tablet TAKE ONE TABLET BY MOUTH TWICE DAILY WITH MEALS  linagliptin (TRADJENTA) 5 mg tablet TAKE ONE TABLET BY MOUTH ONCE DAILY  labetalol (NORMODYNE) 200 mg tablet Take 1 Tab by mouth two (2) times a day.  aspirin 81 mg chewable tablet Take 81 mg by mouth daily.  glucose blood VI test strips (ONETOUCH ULTRA TEST) strip Test blood glucose 6 times daily Dx Code: V52.302  Lancets misc Test blood glucose 6 times daily Dx Code: O24.414  
 Insulin Needles, Disposable, (JOSE ALBERTO PEN NEEDLE) 32 gauge x 5/32\" ndle With insulin  loratadine (CLARITIN) 10 mg tablet Take 10 mg by mouth.  multivitamin (ONE A DAY) tablet Take 1 Tab by mouth daily. No current facility-administered medications for this visit. No Known Allergies Review of Systems: 
- Constitutional Symptoms: no fevers, no chills,  
- Eyes: no blurry vision no double vision - Cardiovascular: no chest pain ,no palpitations - Respiratory: no cough no shortness of breath 
- Gastrointestinal: no dysphagia no  abdominal pain - Psychiatric: no depression no  anxiety - Endocrine: no heat or cold intolerance Physical Examination: 
 Blood pressure 151/76, pulse 60, temperature 97.3 °F (36.3 °C), temperature source Oral, resp. rate 14, height 5' 4\" (1.626 m), weight 262 lb (118.8 kg), SpO2 99 %, unknown if currently breastfeeding. Estimated body mass index is 44.97 kg/m² as calculated from the following: 
  Height as of this encounter: 5' 4\" (1.626 m). -   Weight as of this encounter: 262 lb (118.8 kg). General: pleasant, no distress, HEENT: no pallor, no periorbital edema, EOMI Neck: supple,  
Cardiovascular: regular, normal rate, normal S1 and S2 Respiratory: clear to auscultation bilaterally Neurological:alert and oriented Psychiatric: normal mood and affect 
- Skin: color, texture, turgor normal.  
 
Diabetic foot exam: April 2019 Left:   
 Vibratory sensation normal  
 Filament test normal sensation with micro filament Pulse DP: 1+ Deformities: None Right:  
 Vibratory sensation normal 
 Filament test normal sensation with micro filament Pulse DP: 1+ Deformities: None Data Reviewed:  
 
 
 
 
Assessment/Plan: 1. Type 2 diabetes mellitus with hyperglycemia, with long-term current use of insulin (HCC) 2. Essential hypertension 3. Hirsutism 1. Type 2 Diabetes Mellitus Lab Results Component Value Date/Time Hemoglobin A1c 6.0 (H) 10/10/2018 09:30 AM  
 Hemoglobin A1c (POC) 5.8 04/16/2019 11:01 AM  
 Hemoglobin A1c, External 12.6 02/03/2016 Controlled Metformin, Tradjenta FLU annually ,Pneumovax ,aspirin daily,annual eye exam,microalbumin 2.  Hirsutism - had oligomenorrhea , metformin has helped _PCOS 
 
 
3 HTN -labetalol, losartan 4. Obesity:Body mass index is 44.97 kg/m². Discussed about the importance of exercise and carbohydrate portion control. Thank you for allowing me to participate in the care of this patient. Valentino Angeles MD 
 
 
Patient verbalized understanding

## 2019-04-16 NOTE — LETTER
4/18/19 Patient: Hilma Lesches YOB: 1980 Date of Visit: 4/16/2019 [employer], [address] [address] [address] VIA Facsimile: [phone] Dear Richard Jones, NP, Thank you for referring Ms. Hilma Lesches to [address] [place] [address] for evaluation. My notes for this consultation are attached. If you have questions, please do not hesitate to call me. I look forward to following your patient along with you. Sincerely, Deon Taveras MD

## 2019-04-16 NOTE — PROGRESS NOTES
Vu Reaves is a 41 y.o. female here for Chief Complaint Patient presents with  Diabetes  Diabetic Foot Exam  
 
 
Functional glucose monitor and record keeping system? -not checking Eye exam within last year? - on file Foot exam within last year? - due 1. Have you been to the ER, urgent care clinic since your last visit? Hospitalized since your last visit? -no 
 
2. Have you seen or consulted any other health care providers outside of the [address] [place] [address] since your last visit?   Include any pap smears or colon screening.-no

## 2019-06-02 DIAGNOSIS — L68.0 HIRSUTISM: ICD-10-CM

## 2019-06-02 DIAGNOSIS — E11.65 UNCONTROLLED TYPE 2 DIABETES MELLITUS WITH HYPERGLYCEMIA, WITH LONG-TERM CURRENT USE OF INSULIN (HCC): ICD-10-CM

## 2019-06-02 DIAGNOSIS — Z79.4 UNCONTROLLED TYPE 2 DIABETES MELLITUS WITH HYPERGLYCEMIA, WITH LONG-TERM CURRENT USE OF INSULIN (HCC): ICD-10-CM

## 2019-06-02 RX ORDER — METFORMIN HYDROCHLORIDE 1000 MG/1
TABLET ORAL
Qty: 180 TAB | Refills: 0 | Status: SHIPPED | OUTPATIENT
Start: 2019-06-02 | End: 2019-09-03 | Stop reason: SDUPTHER

## 2019-06-02 RX ORDER — LABETALOL 200 MG/1
TABLET, FILM COATED ORAL
Qty: 180 TAB | Refills: 0 | Status: SHIPPED | OUTPATIENT
Start: 2019-06-02 | End: 2019-09-13 | Stop reason: SDUPTHER

## 2019-08-29 ENCOUNTER — OFFICE VISIT (OUTPATIENT)
Dept: ENDOCRINOLOGY | Age: 39
End: 2019-08-29

## 2019-08-29 VITALS
HEART RATE: 60 BPM | DIASTOLIC BLOOD PRESSURE: 86 MMHG | RESPIRATION RATE: 14 BRPM | WEIGHT: 258 LBS | HEIGHT: 64 IN | OXYGEN SATURATION: 100 % | TEMPERATURE: 98 F | BODY MASS INDEX: 44.05 KG/M2 | SYSTOLIC BLOOD PRESSURE: 151 MMHG

## 2019-08-29 DIAGNOSIS — O24.414 INSULIN CONTROLLED GESTATIONAL DIABETES MELLITUS (GDM) IN FIRST TRIMESTER: ICD-10-CM

## 2019-08-29 DIAGNOSIS — L68.0 HIRSUTISM: ICD-10-CM

## 2019-08-29 DIAGNOSIS — I10 ESSENTIAL HYPERTENSION: ICD-10-CM

## 2019-08-29 DIAGNOSIS — E11.65 TYPE 2 DIABETES MELLITUS WITH HYPERGLYCEMIA, WITH LONG-TERM CURRENT USE OF INSULIN (HCC): Primary | ICD-10-CM

## 2019-08-29 DIAGNOSIS — Z79.4 TYPE 2 DIABETES MELLITUS WITH HYPERGLYCEMIA, WITH LONG-TERM CURRENT USE OF INSULIN (HCC): Primary | ICD-10-CM

## 2019-08-29 RX ORDER — AMLODIPINE BESYLATE 10 MG/1
10 TABLET ORAL
Qty: 90 TAB | Refills: 3 | Status: SHIPPED | OUTPATIENT
Start: 2019-08-29 | End: 2021-01-07

## 2019-08-29 NOTE — PROGRESS NOTES
[facility]               Du Aldrich MD      Patient Information  Date:8/29/2019  Name : Florian Samuel 46 y.o.     YOB: 1980           Chief Complaint   Patient presents with    Diabetes       History of Present Illness: Florian Samuel is a 46 y.o. female here for fu of  Type 2 Diabetes Mellitus. Compliant with her medications  Blood pressure is elevated  Tubal ligation   No hypoglycemia      PNA - was on Abx    She was diagnosed with diabetes in February 2016. Wt Readings from Last 3 Encounters:   08/29/19 258 lb (117 kg)   04/16/19 262 lb (118.8 kg)   10/17/18 254 lb 4.8 oz (115.3 kg)       BP Readings from Last 3 Encounters:   08/29/19 151/86   04/16/19 151/76   10/17/18 147/86           Past Medical History:   Diagnosis Date    Carpal tunnel syndrome     Diabetes (HCC)      Current Outpatient Medications   Medication Sig    metFORMIN (GLUCOPHAGE) 1,000 mg tablet TAKE 1 TABLET BY MOUTH TWICE DAILY WITH A MEAL    labetalol (NORMODYNE) 200 mg tablet TAKE 1 TABLET BY MOUTH TWICE DAILY    linagliptin (TRADJENTA) 5 mg tablet TAKE 1 TABLET BY MOUTH ONCE DAILY    losartan-hydroCHLOROthiazide (HYZAAR) 100-25 mg per tablet Take 1 Tab by mouth every morning.  glucose blood VI test strips (ONETOUCH ULTRA TEST) strip Test blood glucose 6 times daily Dx Code: O24.414    Lancets misc Test blood glucose 6 times daily Dx Code: O24.414    Insulin Needles, Disposable, (JOSE ALBERTO PEN NEEDLE) 32 gauge x 5/32\" ndle With insulin    loratadine (CLARITIN) 10 mg tablet Take 10 mg by mouth.  aspirin 81 mg chewable tablet Take 81 mg by mouth daily.  multivitamin (ONE A DAY) tablet Take 1 Tab by mouth daily. No current facility-administered medications for this visit.       No Known Allergies      Review of Systems:  - Constitutional Symptoms: no fevers, no chills,   - Eyes: no blurry vision no double vision  - Cardiovascular: no chest pain ,no palpitations  - Respiratory: no cough no shortness of breath  - Gastrointestinal: no dysphagia no  abdominal pain  - Psychiatric: no depression no  anxiety  - Endocrine: no heat or cold intolerance    Physical Examination:   Blood pressure 151/86, pulse 60, temperature 98 °F (36.7 °C), temperature source Oral, resp. rate 14, height 5' 4\" (1.626 m), weight 258 lb (117 kg), SpO2 100 %, unknown if currently breastfeeding. Estimated body mass index is 44.29 kg/m² as calculated from the following:    Height as of this encounter: 5' 4\" (1.626 m). -   Weight as of this encounter: 258 lb (117 kg). General: pleasant, no distress,   HEENT: no pallor, no periorbital edema, EOMI  Neck: supple,   Cardiovascular: regular, normal rate, normal S1 and S2  Respiratory: clear to auscultation bilaterally  Neurological:alert and oriented  Psychiatric: normal mood and affect  - Skin: color, texture, turgor normal.     Diabetic foot exam: April 2019     Left:     Vibratory sensation normal    Filament test normal sensation with micro filament   Pulse DP: 1+    Deformities: None  Right:    Vibratory sensation normal   Filament test normal sensation with micro filament   Pulse DP: 1+   Deformities: None      Data Reviewed:           Assessment/Plan:     1. Type 2 diabetes mellitus with hyperglycemia, with long-term current use of insulin ([address]    2. Essential hypertension    3. Hirsutism        1. Type 2 Diabetes Mellitus   Lab Results   Component Value Date/Time    Hemoglobin A1c 6.5 (H) 08/22/2019 09:11 AM    Hemoglobin A1c (POC) 5.8 04/16/2019 11:01 AM    Hemoglobin A1c, External 12.6 02/03/2016   Controlled  Metformin, Tradjenta    FLU annually ,Pneumovax ,aspirin daily,annual eye exam,microalbumin    2. Hirsutism - had oligomenorrhea , metformin has helped _PCOS      3 HTN -labetalol, losartan  Amlodipine added    4. Obesity:Body mass index is 44.29 kg/m².   Discussed about the importance of exercise and carbohydrate portion control. Thank you for allowing me to participate in the care of this patient.     Chris Mann MD      Patient verbalized understanding

## 2019-08-29 NOTE — PROGRESS NOTES
Kilo aMrtinez is a 46 y.o. female here for   Chief Complaint   Patient presents with    Diabetes       Functional glucose monitor and record keeping system? -hasn't been checking  Eye exam within last year? - on file  Foot exam within last year? - on file    1. Have you been to the ER, urgent care clinic since your last visit? Hospitalized since your last visit? -no    2. Have you seen or consulted any other health care providers outside of the [address] [place] [address] since your last visit?   Include any pap smears or colon screening.-no

## 2019-09-03 DIAGNOSIS — Z79.4 UNCONTROLLED TYPE 2 DIABETES MELLITUS WITH HYPERGLYCEMIA, WITH LONG-TERM CURRENT USE OF INSULIN (HCC): ICD-10-CM

## 2019-09-03 DIAGNOSIS — L68.0 HIRSUTISM: ICD-10-CM

## 2019-09-03 DIAGNOSIS — E11.65 UNCONTROLLED TYPE 2 DIABETES MELLITUS WITH HYPERGLYCEMIA, WITH LONG-TERM CURRENT USE OF INSULIN (HCC): ICD-10-CM

## 2019-09-03 RX ORDER — METFORMIN HYDROCHLORIDE 1000 MG/1
TABLET ORAL
Qty: 180 TAB | Refills: 0 | Status: SHIPPED | OUTPATIENT
Start: 2019-09-03 | End: 2020-01-03

## 2019-09-13 RX ORDER — LABETALOL 200 MG/1
TABLET, FILM COATED ORAL
Qty: 180 TAB | Refills: 0 | Status: SHIPPED | OUTPATIENT
Start: 2019-09-13 | End: 2019-12-29

## 2019-10-02 NOTE — MR AVS SNAPSHOT
[address] [facility] [address] 
[phone] Patient: Kathi Roldan MRN: [id number] GJE:[id number] Visit Information Date & Time Provider Department Dept. Phone Encounter #  
 2/6/2018  9:00 AM Leslie Castro MD [facility] [phone] [id number] Follow-up Instructions Return in about 3 months (around 5/6/2018). Upcoming Health Maintenance Date Due  
 FOOT EXAM Q1 5/25/1991 Pneumococcal 19-64 Medium Risk (1 of 1 - PPSV23) 5/25/2000 DTaP/Tdap/Td series (1 - Tdap) 5/25/2002 PAP AKA CERVICAL CYTOLOGY 5/25/2002 Influenza Age 5 to Adult 8/1/2017 MICROALBUMIN Q1 12/30/2017 LIPID PANEL Q1 12/30/2017 HEMOGLOBIN A1C Q6M 7/16/2018 EYE EXAM RETINAL OR DILATED Q1 11/2/2018 Allergies as of 2/6/2018  Review Complete On: 2/6/2018 By: Leslie Castro MD  
 No Known Allergies Current Immunizations  Never Reviewed No immunizations on file. Not reviewed this visit You Were Diagnosed With   
  
 Codes Comments Type 2 diabetes mellitus with hyperglycemia, with long-term current use of insulin (HCC)    -  Primary ICD-10-CM: E11.65, Z79.4 ICD-9-CM: 250.00, 790.29, V58.67 Pre-existing diabetes mellitus affecting pregnancy in second trimester, antepartum     ICD-10-CM: O24.312 ICD-9-CM: 648.03, 250.00 Pre-existing diabetes mellitus in pregnancy in first trimester     ICD-10-CM: O24.311 ICD-9-CM: 648.03, 250.00 Vitals BP Pulse Temp Resp Height(growth percentile) Weight(growth percentile) 142/73 (BP 1 Location: Right arm, BP Patient Position: Sitting) 73 97 °F (36.1 °C) (Oral) 18 5' 4\" (1.626 m) 288 lb 14.4 oz (131 kg) SpO2 BMI OB Status Smoking Status 99% 49.59 kg/m2 Pregnant Former Smoker BMI and BSA Data Body Mass Index Body Surface Area  
 49.59 kg/m 2 2.43 m 2 Preferred Pharmacy Pharmacy Name Phone Patient's blood pressure is currently well controlled on current regiment.  We will check CBC and CMP.  Will refill patient's lisinopril.   [facility], [address] [place] [address] [phone] Your Updated Medication List  
  
   
This list is accurate as of: 2/6/18  9:28 AM.  Always use your most recent med list.  
  
  
  
  
 aspirin 81 mg chewable tablet Take 81 mg by mouth daily. cholecalciferol 1,000 unit Cap Commonly known as:  VITAMIN D3 Take 1,000 Units by mouth three (3) times daily. CLARITIN 10 mg tablet Generic drug:  loratadine Take 10 mg by mouth. clotrimazole-betamethasone topical cream  
Commonly known as:  Gurjit Carrion Twice daily to hand  
  
 cyanocobalamin 500 mcg tablet Commonly known as:  VITAMIN B12 Take 500 mcg by mouth daily. glucose blood VI test strips strip Commonly known as:  ONETOUCH ULTRA TEST Test blood glucose 6 times daily Dx Code: C38.105 Insulin Needles (Disposable) 32 gauge x 5/32\" Ndle Commonly known as:  Andree Pen Needle With insulin  
  
 insulin  unit/mL injection Commonly known as:  NovoLIN N Inject 40 units q HS. Stop Humulin N & Levemir  
  
 insulin regular 100 unit/mL injection Commonly known as:  NovoLIN R Inject 18 units before each meal with SSI. Max units daily: 105  
  
 insulin syringe-needle U-100 1 mL 31 gauge x 15/64\" Syrg Commonly known as:  BD INSULIN SYRINGE ULTRA-FINE  
1 Syringe by SubCUTAneous route four (4) times daily. DX code O24.311  
  
 labetalol 200 mg tablet Commonly known as:  Gayl Kavya Take 200 mg by mouth two (2) times a day. Lancets Misc Test blood glucose 6 times daily Dx Code: O24.414  
  
 metFORMIN 1,000 mg tablet Commonly known as:  GLUCOPHAGE  
TAKE ONE TABLET BY MOUTH TWICE DAILY WITH MEALS  
  
 methyldopa 500 mg tablet Commonly known as:  ALDOMET Take 1 Tab by mouth three (3) times daily. multivitamin tablet Commonly known as:  ONE A DAY Take 1 Tab by mouth daily. PNV38-Iron Cbn&Gluc-FA-DSS-DHA 35-1- mg Cmpk Take  by mouth. pyridoxine (vitamin B6) 100 mg tablet Commonly known as:  VITAMIN B-6 Take 100 mg by mouth daily. VITAMIN C PO Take 1,000 mg by mouth daily. We Performed the Following  DIABETES FOOT EXAM [7 Custom] Follow-up Instructions Return in about 3 months (around 5/6/2018). Patient Instructions Goals for blood sugar: 
- fasting: less than 90 - 95 
- 2 hours after a meal: less than 120 Check blood sugars immediately before breakfast  , 2 hour after the meals Novolin N  ( cloudy )  insulin 30 units at bed time, if you have low sugars in the middle of the night then decrease Novolin N to 26 units Humalog or Novolin R  18  units before each meal  
 
 
Additional Humalog or Novolin R  for high sugars Blood sugar  insulin 120-150  Add 2 units 150-200  Add 4 Units 201-250  Add 6 Units The night before delivery - take the full dose of Novolin N even if you are not eating after midnight After delivery continue metformin , the goals ate before meals < 120 , 2 hours after meals < 160 Follow up in 6 weeks Introducing [facility]! Angela Fowler introduces M[employer] Pixel patient portal. Now you can access parts of your medical record, email your doctor's office, and request medication refills online. 1. In your internet browser, go to [email or web address] 2. Click on the First Time User? Click Here link in the Sign In box. You will see the New Member Sign Up page. 3. Enter your Mashed Pixel Access Code exactly as it appears below. You will not need to use this code after youve completed the sign-up process. If you do not sign up before the expiration date, you must request a new code. · Mashed Pixel Access Code: [email or web address] Expires: 3/19/2018  9:18 AM 
 
4. Enter the last four digits of your Social Security Number (xxxx) and Date of Birth (mm/dd/yyyy) as indicated and click Submit.  You will be taken to the next sign-up page. 5. Create a Clinithink ID. This will be your [employer]ithink login ID and cannot be changed, so think of one that is secure and easy to remember. 6. Create a [employer]ithink password. You can change your password at any time. 7. Enter your Password Reset Question and Answer. This can be used at a later time if you forget your password. 8. Enter your e-mail address. You will receive e-mail notification when new information is available in [address] 9. Click Sign Up. You can now view and download portions of your medical record. 10. Click the Download Summary menu link to download a portable copy of your medical information. If you have questions, please visit the Frequently Asked Questions section of the [employer] website. Remember, [employer]ithink is NOT to be used for urgent needs. For medical emergencies, dial 911. Now available from your iPhone and Android! Please provide this summary of care documentation to your next provider. Your primary care clinician is listed as Marilee Holter. If you have any questions after today's visit, please call [phone].

## 2019-11-04 DIAGNOSIS — E11.65 TYPE 2 DIABETES MELLITUS WITH HYPERGLYCEMIA, WITHOUT LONG-TERM CURRENT USE OF INSULIN (HCC): ICD-10-CM

## 2019-11-05 DIAGNOSIS — I10 ESSENTIAL HYPERTENSION: ICD-10-CM

## 2019-11-05 DIAGNOSIS — Z79.4 TYPE 2 DIABETES MELLITUS WITH HYPERGLYCEMIA, WITH LONG-TERM CURRENT USE OF INSULIN (HCC): ICD-10-CM

## 2019-11-05 DIAGNOSIS — E11.65 TYPE 2 DIABETES MELLITUS WITH HYPERGLYCEMIA, WITH LONG-TERM CURRENT USE OF INSULIN (HCC): ICD-10-CM

## 2019-11-05 RX ORDER — LOSARTAN POTASSIUM AND HYDROCHLOROTHIAZIDE 25; 100 MG/1; MG/1
1 TABLET ORAL
Qty: 90 TAB | Refills: 3 | Status: SHIPPED | OUTPATIENT
Start: 2019-11-05 | End: 2019-11-08 | Stop reason: ALTCHOICE

## 2019-11-05 RX ORDER — LOSARTAN POTASSIUM 100 MG/1
TABLET ORAL
Qty: 90 TAB | Refills: 0 | OUTPATIENT
Start: 2019-11-05

## 2019-11-08 RX ORDER — HYDROCHLOROTHIAZIDE 25 MG/1
25 TABLET ORAL DAILY
Qty: 90 TAB | Refills: 3 | Status: SHIPPED | OUTPATIENT
Start: 2019-11-08 | End: 2020-04-15

## 2019-11-08 RX ORDER — LOSARTAN POTASSIUM 100 MG/1
100 TABLET ORAL DAILY
Qty: 90 TAB | Refills: 3 | Status: SHIPPED | OUTPATIENT
Start: 2019-11-08 | End: 2020-12-24 | Stop reason: SDUPTHER

## 2019-12-04 NOTE — PROGRESS NOTES
Gene Minaya is a 46 y.o. female here for   Chief Complaint   Patient presents with    Diabetes       Functional glucose monitor and record keeping system? -not lately  Eye exam within last year? -due   Foot exam within last year? - on file    1. Have you been to the ER, urgent care clinic since your last visit? Hospitalized since your last visit? -no    2. Have you seen or consulted any other health care providers outside of the [address] [place] [address] since your last visit?   Include any pap smears or colon screening.-no

## 2019-12-05 ENCOUNTER — OFFICE VISIT (OUTPATIENT)
Dept: ENDOCRINOLOGY | Age: 39
End: 2019-12-05

## 2019-12-05 VITALS
DIASTOLIC BLOOD PRESSURE: 63 MMHG | RESPIRATION RATE: 14 BRPM | SYSTOLIC BLOOD PRESSURE: 134 MMHG | HEART RATE: 66 BPM | BODY MASS INDEX: 44.39 KG/M2 | TEMPERATURE: 97.1 F | WEIGHT: 260 LBS | HEIGHT: 64 IN | OXYGEN SATURATION: 100 %

## 2019-12-05 DIAGNOSIS — E11.65 TYPE 2 DIABETES MELLITUS WITH HYPERGLYCEMIA, WITH LONG-TERM CURRENT USE OF INSULIN (HCC): Primary | ICD-10-CM

## 2019-12-05 DIAGNOSIS — L68.0 HIRSUTISM: ICD-10-CM

## 2019-12-05 DIAGNOSIS — I10 ESSENTIAL HYPERTENSION: ICD-10-CM

## 2019-12-05 DIAGNOSIS — Z79.4 TYPE 2 DIABETES MELLITUS WITH HYPERGLYCEMIA, WITH LONG-TERM CURRENT USE OF INSULIN (HCC): Primary | ICD-10-CM

## 2019-12-05 LAB
GLUCOSE POC: 194 MG/DL
HBA1C MFR BLD HPLC: 5.8 %

## 2019-12-05 RX ORDER — LANCETS 33 GAUGE
EACH MISCELLANEOUS
Qty: 100 LANCET | Refills: 3 | Status: SHIPPED | OUTPATIENT
Start: 2019-12-05 | End: 2021-03-03 | Stop reason: SDUPTHER

## 2019-12-05 RX ORDER — SPIRONOLACTONE 100 MG/1
100 TABLET, FILM COATED ORAL
Qty: 90 TAB | Refills: 3 | Status: SHIPPED | OUTPATIENT
Start: 2019-12-05 | End: 2020-12-24 | Stop reason: SDUPTHER

## 2019-12-05 NOTE — LETTER
12/5/19 Patient: Luz Maria Mendosa YOB: 1980 Date of Visit: 12/5/2019 Annie Cruz, [address] [address] [address] [address] [address] VIA Facsimile: [phone] Dear Annie Cruz, NP, Thank you for referring Ms. Luz Maria Mendosa to [address] [place] [address] for evaluation. My notes for this consultation are attached. If you have questions, please do not hesitate to call me. I look forward to following your patient along with you. Sincerely, Connor Avalos MD

## 2019-12-05 NOTE — PROGRESS NOTES
Thierry Sims MD      Patient Information  Date:12/5/2019  Name : Miranda Stanley 46 y.o.     YOB: 1980           Chief Complaint   Patient presents with    Diabetes       History of Present Illness: Miranda Stanley is a 46 y.o. female here for fu of  Type 2 Diabetes Mellitus. Compliant with her medications  Blood pressure is better  Complains of excess hair growth  She is not on losartan or hydrochlorothiazide  Tubal ligation   No hypoglycemia      PNA - was on Abx    She was diagnosed with diabetes in February 2016. Wt Readings from Last 3 Encounters:   12/05/19 260 lb (117.9 kg)   08/29/19 258 lb (117 kg)   04/16/19 262 lb (118.8 kg)       BP Readings from Last 3 Encounters:   12/05/19 134/63   08/29/19 151/86   04/16/19 151/76           Past Medical History:   Diagnosis Date    Carpal tunnel syndrome     Diabetes (HCC)      Current Outpatient Medications   Medication Sig    labetalol (NORMODYNE) 200 mg tablet TAKE 1 TABLET BY MOUTH TWICE DAILY    metFORMIN (GLUCOPHAGE) 1,000 mg tablet TAKE 1 TABLET BY MOUTH TWICE DAILY WITH A MEAL    glucose blood VI test strips (ONETOUCH ULTRA TEST) strip Test blood glucose 6 times daily Dx Code: O24.414    amLODIPine (NORVASC) 10 mg tablet Take 1 Tab by mouth every morning.  linagliptin (TRADJENTA) 5 mg tablet TAKE 1 TABLET BY MOUTH ONCE DAILY    Lancets misc Test blood glucose 6 times daily Dx Code: O24.414    Insulin Needles, Disposable, (JOSE ALBERTO PEN NEEDLE) 32 gauge x 5/32\" ndle With insulin    loratadine (CLARITIN) 10 mg tablet Take 10 mg by mouth.  losartan (COZAAR) 100 mg tablet Take 1 Tab by mouth daily. Stop Hyzaar    hydroCHLOROthiazide (HYDRODIURIL) 25 mg tablet Take 1 Tab by mouth daily. Stop Hyzaar    aspirin 81 mg chewable tablet Take 81 mg by mouth daily.  multivitamin (ONE A DAY) tablet Take 1 Tab by mouth daily.      No current facility-administered medications for this visit. No Known Allergies      Review of Systems:  - Constitutional Symptoms: no fevers, no chills,   - Eyes: no blurry vision no double vision  - Cardiovascular: no chest pain ,no palpitations  - Respiratory: no cough no shortness of breath  - Gastrointestinal: no dysphagia no  abdominal pain  - Psychiatric: no depression no  anxiety  - Endocrine: no heat or cold intolerance    Physical Examination:   Blood pressure 134/63, pulse 66, temperature 97.1 °F (36.2 °C), temperature source Oral, resp. rate 14, height 5' 4\" (1.626 m), weight 260 lb (117.9 kg), SpO2 100 %, unknown if currently breastfeeding. Estimated body mass index is 44.63 kg/m² as calculated from the following:    Height as of this encounter: 5' 4\" (1.626 m). -   Weight as of this encounter: 260 lb (117.9 kg). General: pleasant, no distress,   HEENT: no pallor, no periorbital edema, EOMI  Neck: supple,   Cardiovascular: regular, normal rate, normal S1 and S2  Respiratory: clear to auscultation bilaterally  Neurological:alert and oriented  Psychiatric: normal mood and affect  - Skin: color, texture, turgor normal.     Diabetic foot exam: April 2019     Left:     Vibratory sensation normal    Filament test normal sensation with micro filament   Pulse DP: 1+    Deformities: None  Right:    Vibratory sensation normal   Filament test normal sensation with micro filament   Pulse DP: 1+   Deformities: None      Data Reviewed:           Assessment/Plan:     1. Type 2 diabetes mellitus with hyperglycemia, with long-term current use of insulin ([address]    2. Essential hypertension        1. Type 2 Diabetes Mellitus   Lab Results   Component Value Date/Time    Hemoglobin A1c 6.5 (H) 08/22/2019 09:11 AM    Hemoglobin A1c (POC) 5.8 04/16/2019 11:01 AM    Hemoglobin A1c, External 12.6 02/03/2016   Controlled  Metformin, Tradjenta    FLU annually ,Pneumovax ,aspirin daily,annual eye exam,microalbumin    2.   Hirsutism - had oligomenorrhea , metformin has helped _PCOS  Spironolactone, tubal ligation    3 HTN -labetalol, off losartan-HCTZ  Amlodipine delete that  Spironolactone    4. Obesity:Body mass index is 44.63 kg/m². Discussed about the importance of exercise and carbohydrate portion control. Thank you for allowing me to participate in the care of this patient.     Dalia Davis MD      Patient verbalized understanding

## 2019-12-29 RX ORDER — LABETALOL 200 MG/1
TABLET, FILM COATED ORAL
Qty: 180 TAB | Refills: 0 | Status: SHIPPED | OUTPATIENT
Start: 2019-12-29 | End: 2020-04-15

## 2020-03-31 ENCOUNTER — TELEPHONE (OUTPATIENT)
Dept: ENDOCRINOLOGY | Age: 40
End: 2020-03-31

## 2020-03-31 DIAGNOSIS — E11.65 UNCONTROLLED TYPE 2 DIABETES MELLITUS WITH HYPERGLYCEMIA, WITH LONG-TERM CURRENT USE OF INSULIN (HCC): Primary | ICD-10-CM

## 2020-03-31 DIAGNOSIS — Z79.4 UNCONTROLLED TYPE 2 DIABETES MELLITUS WITH HYPERGLYCEMIA, WITH LONG-TERM CURRENT USE OF INSULIN (HCC): Primary | ICD-10-CM

## 2020-04-11 LAB
ALBUMIN SERPL-MCNC: 4.6 G/DL (ref 3.8–4.8)
ALBUMIN/CREAT UR: 6 MG/G CREAT (ref 0–29)
ALBUMIN/GLOB SERPL: 1.5 {RATIO} (ref 1.2–2.2)
ALP SERPL-CCNC: 62 IU/L (ref 39–117)
ALT SERPL-CCNC: 13 IU/L (ref 0–32)
AST SERPL-CCNC: 14 IU/L (ref 0–40)
BILIRUB SERPL-MCNC: 0.3 MG/DL (ref 0–1.2)
BUN SERPL-MCNC: 10 MG/DL (ref 6–20)
BUN/CREAT SERPL: 13 (ref 9–23)
CALCIUM SERPL-MCNC: 9.7 MG/DL (ref 8.7–10.2)
CHLORIDE SERPL-SCNC: 100 MMOL/L (ref 96–106)
CO2 SERPL-SCNC: 21 MMOL/L (ref 20–29)
CREAT SERPL-MCNC: 0.75 MG/DL (ref 0.57–1)
CREAT UR-MCNC: 61.4 MG/DL
EST. AVERAGE GLUCOSE BLD GHB EST-MCNC: 154 MG/DL
GLOBULIN SER CALC-MCNC: 3.1 G/DL (ref 1.5–4.5)
GLUCOSE SERPL-MCNC: 97 MG/DL (ref 65–99)
HBA1C MFR BLD: 7 % (ref 4.8–5.6)
LDLC SERPL DIRECT ASSAY-MCNC: 51 MG/DL (ref 0–99)
MICROALBUMIN UR-MCNC: 3.4 UG/ML
POTASSIUM SERPL-SCNC: 5.1 MMOL/L (ref 3.5–5.2)
PROT SERPL-MCNC: 7.7 G/DL (ref 6–8.5)
SODIUM SERPL-SCNC: 136 MMOL/L (ref 134–144)

## 2020-04-15 ENCOUNTER — VIRTUAL VISIT (OUTPATIENT)
Dept: ENDOCRINOLOGY | Age: 40
End: 2020-04-15

## 2020-04-15 DIAGNOSIS — Z79.4 TYPE 2 DIABETES MELLITUS WITH HYPERGLYCEMIA, WITH LONG-TERM CURRENT USE OF INSULIN (HCC): Primary | ICD-10-CM

## 2020-04-15 DIAGNOSIS — L68.0 HIRSUTISM: ICD-10-CM

## 2020-04-15 DIAGNOSIS — E11.65 TYPE 2 DIABETES MELLITUS WITH HYPERGLYCEMIA, WITH LONG-TERM CURRENT USE OF INSULIN (HCC): Primary | ICD-10-CM

## 2020-04-15 DIAGNOSIS — E28.2 PCOS (POLYCYSTIC OVARIAN SYNDROME): ICD-10-CM

## 2020-04-15 DIAGNOSIS — I10 ESSENTIAL HYPERTENSION: ICD-10-CM

## 2020-04-15 NOTE — PROGRESS NOTES
Bo Singh MD      Patient Information  Date:4/15/2020  Name : Truong Peck 46 y.o.     YOB: 1980           Chief Complaint   Patient presents with    Diabetes       Pursuant to the emergency declaration under the [facility] [address] [facility] authority and the [employer] Act, this Virtual  Visit was conducted, with patient's consent, to reduce the patient's risk of exposure to COVID-19 and provide continuity of care for an established patient. Services were provided through a video synchronous discussion virtually to substitute for in-person clinic visit. History of Present Illness: Truong Peck is a 46 y.o. female here for fu of  Type 2 Diabetes Mellitus. Compliant with her medications  Blood pressure is better  Hirsutism: On spironolactone which is helping  History of tubal ligation  Potassium is good  No hypoglycemia  No fever, sore throat, cough          She was diagnosed with diabetes in February 2016. Wt Readings from Last 3 Encounters:   12/05/19 260 lb (117.9 kg)   08/29/19 258 lb (117 kg)   04/16/19 262 lb (118.8 kg)       BP Readings from Last 3 Encounters:   12/05/19 134/63   08/29/19 151/86   04/16/19 151/76           Past Medical History:   Diagnosis Date    Carpal tunnel syndrome     Diabetes (HCC)      Current Outpatient Medications   Medication Sig    metFORMIN (GLUCOPHAGE) 1,000 mg tablet TAKE 1 TABLET BY MOUTH TWICE DAILY WITH A MEAL    spironolactone (ALDACTONE) 100 mg tablet Take 1 Tab by mouth nightly.  lancets (ONETOUCH DELICA PLUS LANCET) 33 gauge misc Test once daily Dx Code: E11.65    glucose blood VI test strips (ONETOUCH VERIO) strip Test once daily Dx Code: E11.65    losartan (COZAAR) 100 mg tablet Take 1 Tab by mouth daily.  Stop Hyzaar    amLODIPine (NORVASC) 10 mg tablet Take 1 Tab by mouth every morning.  linagliptin (TRADJENTA) 5 mg tablet TAKE 1 TABLET BY MOUTH ONCE DAILY    Insulin Needles, Disposable, (JOSE ALBERTO PEN NEEDLE) 32 gauge x 5/32\" ndle With insulin    loratadine (CLARITIN) 10 mg tablet Take 10 mg by mouth.  labetalol (NORMODYNE) 200 mg tablet TAKE 1 TABLET BY MOUTH TWICE DAILY    hydroCHLOROthiazide (HYDRODIURIL) 25 mg tablet Take 1 Tab by mouth daily. Stop Hyzaar    aspirin 81 mg chewable tablet Take 81 mg by mouth daily.  multivitamin (ONE A DAY) tablet Take 1 Tab by mouth daily. No current facility-administered medications for this visit. No Known Allergies      Review of Systems:  - Constitutional Symptoms: no fevers, no chills,   - Eyes: no blurry vision no double vision  - Cardiovascular: no chest pain ,no palpitations  - Respiratory: no cough no shortness of breath  - Gastrointestinal: no dysphagia no  abdominal pain  - Psychiatric: no depression no  anxiety  - Endocrine: no heat or cold intolerance    Physical Examination:   unknown if currently breastfeeding. Estimated body mass index is 44.63 kg/m² as calculated from the following:    Height as of 12/5/19: 5' 4\" (1.626 m). -   Weight as of 12/5/19: 260 lb (117.9 kg). - General: pleasant, no distress, good eye contact  - HEENT: no exophthalmos, no periorbital edema, EOMI  - Neck: No visible thyromegaly  - RS: Normal respiratory effort  - Musculoskeletal: no tremors  - Neurological: alert and oriented  - Psychiatric: normal mood and affect  - Skin: Normal color    Diabetic foot exam: April 2019     Left:     Vibratory sensation normal    Filament test normal sensation with micro filament   Pulse DP: 1+    Deformities: None  Right:    Vibratory sensation normal   Filament test normal sensation with micro filament   Pulse DP: 1+   Deformities: None      Data Reviewed:           Assessment/Plan:     1. Type 2 diabetes mellitus with hyperglycemia, with long-term current use of insulin ([address]    2.  Essential hypertension 3. Hirsutism        1. Type 2 Diabetes Mellitus   Lab Results   Component Value Date/Time    Hemoglobin A1c 7.0 (H) 04/10/2020 01:52 PM    Hemoglobin A1c (POC) 5.8 12/05/2019 09:56 AM    Hemoglobin A1c, External 12.6 02/03/2016   Controlled  Metformin, Tradjenta    FLU annually ,Pneumovax ,aspirin daily,annual eye exam,microalbumin    2. PCOS/hirsutism - had oligomenorrhea , metformin has helped _PCOS  Spironolactone, tubal ligation    3 HTN -losartan, off labetalol    Spironolactone    4. Obesity:There is no height or weight on file to calculate BMI. Discussed about the importance of exercise and carbohydrate portion control. Thank you for allowing me to participate in the care of this patient.     Gemini Mejía MD      Patient verbalized understanding

## 2020-04-15 NOTE — PROGRESS NOTES
Gemini Damon is a 46 y.o. female here for   Chief Complaint   Patient presents with    Diabetes     Pt consented to virtual visit. 1. Have you been to the ER, urgent care clinic since your last visit? Hospitalized since your last visit? -no    2. Have you seen or consulted any other health care providers outside of the [address] [place] [address] since your last visit? Include any pap smears or colon screening. -PCP and GYN

## 2020-06-10 DIAGNOSIS — E11.00 UNCONTROLLED TYPE 2 DIABETES MELLITUS WITH HYPEROSMOLARITY WITHOUT COMA, WITHOUT LONG-TERM CURRENT USE OF INSULIN (HCC): ICD-10-CM

## 2020-06-10 DIAGNOSIS — L30.9 DERMATITIS: ICD-10-CM

## 2020-06-10 RX ORDER — LINAGLIPTIN 5 MG/1
TABLET, FILM COATED ORAL
Qty: 90 TAB | Refills: 4 | Status: SHIPPED | OUTPATIENT
Start: 2020-06-10 | End: 2021-01-07 | Stop reason: ALTCHOICE

## 2020-09-02 ENCOUNTER — OFFICE VISIT (OUTPATIENT)
Dept: ENDOCRINOLOGY | Age: 40
End: 2020-09-02
Payer: COMMERCIAL

## 2020-09-02 VITALS
RESPIRATION RATE: 16 BRPM | OXYGEN SATURATION: 100 % | WEIGHT: 258 LBS | TEMPERATURE: 97.5 F | HEIGHT: 64 IN | BODY MASS INDEX: 44.05 KG/M2 | DIASTOLIC BLOOD PRESSURE: 52 MMHG | HEART RATE: 70 BPM | SYSTOLIC BLOOD PRESSURE: 109 MMHG

## 2020-09-02 DIAGNOSIS — I10 ESSENTIAL HYPERTENSION: ICD-10-CM

## 2020-09-02 DIAGNOSIS — Z79.4 TYPE 2 DIABETES MELLITUS WITH HYPERGLYCEMIA, WITH LONG-TERM CURRENT USE OF INSULIN (HCC): Primary | ICD-10-CM

## 2020-09-02 DIAGNOSIS — E66.01 MORBID OBESITY (HCC): ICD-10-CM

## 2020-09-02 DIAGNOSIS — E11.65 TYPE 2 DIABETES MELLITUS WITH HYPERGLYCEMIA, WITH LONG-TERM CURRENT USE OF INSULIN (HCC): Primary | ICD-10-CM

## 2020-09-02 DIAGNOSIS — L68.0 HIRSUTISM: ICD-10-CM

## 2020-09-02 LAB — HBA1C MFR BLD HPLC: 6.5 %

## 2020-09-02 PROCEDURE — 83036 HEMOGLOBIN GLYCOSYLATED A1C: CPT | Performed by: INTERNAL MEDICINE

## 2020-09-02 PROCEDURE — 99214 OFFICE O/P EST MOD 30 MIN: CPT | Performed by: INTERNAL MEDICINE

## 2020-09-02 NOTE — PROGRESS NOTES
Dolores Bartholomew MD      Patient Information  Date:9/2/2020  Name : Raymona Claude 45 y.o.     YOB: 1980           Chief Complaint   Patient presents with    Diabetes         History of Present Illness: Raymona Claude is a 45 y.o. female here for fu of  Type 2 Diabetes Mellitus. Compliant with her medications  Blood pressure is better  Hirsutism: On spironolactone which is helping  History of tubal ligation  No recent labs  Not able to lose weight, has tried different diets  No hypoglycemia  No fever, sore throat, cough          She was diagnosed with diabetes in February 2016. Wt Readings from Last 3 Encounters:   09/02/20 258 lb (117 kg)   12/05/19 260 lb (117.9 kg)   08/29/19 258 lb (117 kg)       BP Readings from Last 3 Encounters:   09/02/20 109/52   12/05/19 134/63   08/29/19 151/86           Past Medical History:   Diagnosis Date    Carpal tunnel syndrome     Diabetes ([facility] [address]      Current Outpatient Medications   Medication Sig    linaGLIPtin (Tradjenta) 5 mg tablet TAKE 1 TABLET BY MOUTH EVERY DAY    metFORMIN (GLUCOPHAGE) 1,000 mg tablet TAKE 1 TABLET BY MOUTH TWICE DAILY WITH A MEAL    spironolactone (ALDACTONE) 100 mg tablet Take 1 Tab by mouth nightly.  lancets (ONETOUCH DELICA PLUS LANCET) 33 gauge misc Test once daily Dx Code: E11.65    glucose blood VI test strips (ONETOUCH VERIO) strip Test once daily Dx Code: E11.65    losartan (COZAAR) 100 mg tablet Take 1 Tab by mouth daily. Stop Hyzaar    Insulin Needles, Disposable, (JOSE ALBERTO PEN NEEDLE) 32 gauge x 5/32\" ndle With insulin    loratadine (CLARITIN) 10 mg tablet Take 10 mg by mouth daily as needed.  amLODIPine (NORVASC) 10 mg tablet Take 1 Tab by mouth every morning. No current facility-administered medications for this visit.       No Known Allergies      Review of Systems:  - Constitutional Symptoms: no fevers, no chills,   - Eyes: no blurry vision no double vision  - Cardiovascular: no chest pain ,no palpitations  - Respiratory: no cough no shortness of breath  - Gastrointestinal: no dysphagia no  abdominal pain  - Psychiatric: no depression no  anxiety  - Endocrine: no heat or cold intolerance    Physical Examination:   Blood pressure 109/52, pulse 70, temperature 97.5 °F (36.4 °C), temperature source Oral, resp. rate 16, height 5' 4\" (1.626 m), weight 258 lb (117 kg), SpO2 100 %, unknown if currently breastfeeding. Estimated body mass index is 44.29 kg/m² as calculated from the following:    Height as of this encounter: 5' 4\" (1.626 m). -   Weight as of this encounter: 258 lb (117 kg). - General: pleasant, no distress, good eye contact  - HEENT: no exophthalmos, no periorbital edema, EOMI  - Neck: No visible thyromegaly  - RS: Normal respiratory effort  - Musculoskeletal: no tremors  - Neurological: alert and oriented  - Psychiatric: normal mood and affect  - Skin: Normal color    Diabetic foot exam: Sept 2020     Left:     Vibratory sensation normal    Filament test normal sensation with micro filament   Pulse DP: 1+    Deformities: None  Right:    Vibratory sensation normal   Filament test normal sensation with micro filament   Pulse DP: 1+   Deformities: None      Data Reviewed:           Assessment/Plan:     1. Type 2 diabetes mellitus with hyperglycemia, with long-term current use of insulin ([address]    2. Essential hypertension    3. Hirsutism        1. Type 2 Diabetes Mellitus   Lab Results   Component Value Date/Time    Hemoglobin A1c 7.0 (H) 04/10/2020 01:52 PM    Hemoglobin A1c (POC) 5.8 12/05/2019 09:56 AM    Hemoglobin A1c, External 12.6 02/03/2016   Controlled  Metformin, Tradjenta    FLU annually ,Pneumovax ,aspirin daily,annual eye exam,microalbumin    2. PCOS/hirsutism - had oligomenorrhea , metformin has helped _PCOS  Spironolactone, tubal ligation    3 HTN -losartan, off labetalol    Spironolactone    4. Obesity:Body mass index is 44.29 kg/m². Discussed about the importance of exercise and carbohydrate portion control. Has tried Atkins diet ,ketodiet , Herbal life   She wants to pursue bariatric surgery      Thank you for allowing me to participate in the care of this patient.     Seth Goldberg MD      Patient verbalized understanding

## 2020-09-02 NOTE — PROGRESS NOTES
Edwina Comer is a 45 y.o. female here for   Chief Complaint   Patient presents with    Diabetes       1. Have you been to the ER, urgent care clinic since your last visit? Hospitalized since your last visit? -no    2. Have you seen or consulted any other health care providers outside of the [address] [place] [address] since your last visit? Include any pap smears or colon screening. -PCP

## 2020-09-02 NOTE — PATIENT INSTRUCTIONS
Phentermine - We will begin a trial of weight loss medication to help with metabolism. Please watch out for chest pain, heart racing, dizziness, or anxiety and let me know if you develop any of these symptoms. Please monitor your blood pressure 1-2 times a week while on this and let me know if you are having any readings over 140/90. You can not be pregnant or get pregnant while you take the medication. If there is any possibility of pregnancy, a pregnancy test should be done to rule out before starting medication. [facility] [place] locally , Dr Sangeetha Vaughan is at [facility]

## 2020-09-02 NOTE — LETTER
9/2/20 Patient: Yuval Good YOB: 1980 Date of Visit: 9/2/2020 Diane Melchor, [address] [address] [address] [address] VIA Facsimile: [phone] Dear Diane Melchor, NP, Thank you for referring Ms. Yuval Good to [address] [place] [address] for evaluation. My notes for this consultation are attached. If you have questions, please do not hesitate to call me. I look forward to following your patient along with you. Sincerely, Akira Russell MD

## 2020-11-30 DIAGNOSIS — I10 ESSENTIAL HYPERTENSION: Primary | ICD-10-CM

## 2020-11-30 RX ORDER — LABETALOL 200 MG/1
200 TABLET, FILM COATED ORAL 2 TIMES DAILY
Qty: 180 TAB | Refills: 3 | Status: SHIPPED | OUTPATIENT
Start: 2020-11-30 | End: 2020-12-23 | Stop reason: SDUPTHER

## 2020-12-23 DIAGNOSIS — I10 ESSENTIAL HYPERTENSION: ICD-10-CM

## 2020-12-24 DIAGNOSIS — Z79.4 TYPE 2 DIABETES MELLITUS WITH HYPERGLYCEMIA, WITH LONG-TERM CURRENT USE OF INSULIN (HCC): ICD-10-CM

## 2020-12-24 DIAGNOSIS — E11.65 TYPE 2 DIABETES MELLITUS WITH HYPERGLYCEMIA, WITH LONG-TERM CURRENT USE OF INSULIN (HCC): ICD-10-CM

## 2020-12-24 DIAGNOSIS — E11.65 TYPE 2 DIABETES MELLITUS WITH HYPERGLYCEMIA, WITHOUT LONG-TERM CURRENT USE OF INSULIN (HCC): ICD-10-CM

## 2020-12-24 RX ORDER — SPIRONOLACTONE 100 MG/1
100 TABLET, FILM COATED ORAL
Qty: 90 TAB | Refills: 3 | Status: SHIPPED | OUTPATIENT
Start: 2020-12-24 | End: 2021-12-13

## 2020-12-24 RX ORDER — LOSARTAN POTASSIUM 100 MG/1
100 TABLET ORAL DAILY
Qty: 90 TAB | Refills: 3 | Status: SHIPPED | OUTPATIENT
Start: 2020-12-24 | End: 2022-01-01

## 2020-12-24 RX ORDER — LABETALOL 200 MG/1
200 TABLET, FILM COATED ORAL 2 TIMES DAILY
Qty: 180 TAB | Refills: 3 | Status: SHIPPED | OUTPATIENT
Start: 2020-12-24 | End: 2021-01-07

## 2021-01-07 ENCOUNTER — OFFICE VISIT (OUTPATIENT)
Dept: ENDOCRINOLOGY | Age: 41
End: 2021-01-07
Payer: COMMERCIAL

## 2021-01-07 VITALS
BODY MASS INDEX: 42.3 KG/M2 | SYSTOLIC BLOOD PRESSURE: 138 MMHG | OXYGEN SATURATION: 100 % | HEART RATE: 76 BPM | RESPIRATION RATE: 14 BRPM | HEIGHT: 64 IN | TEMPERATURE: 97.2 F | WEIGHT: 247.8 LBS | DIASTOLIC BLOOD PRESSURE: 78 MMHG

## 2021-01-07 DIAGNOSIS — I10 ESSENTIAL HYPERTENSION: ICD-10-CM

## 2021-01-07 DIAGNOSIS — E11.65 TYPE 2 DIABETES MELLITUS WITH HYPERGLYCEMIA, WITH LONG-TERM CURRENT USE OF INSULIN (HCC): Primary | ICD-10-CM

## 2021-01-07 DIAGNOSIS — L68.0 HIRSUTISM: ICD-10-CM

## 2021-01-07 DIAGNOSIS — Z79.4 TYPE 2 DIABETES MELLITUS WITH HYPERGLYCEMIA, WITH LONG-TERM CURRENT USE OF INSULIN (HCC): Primary | ICD-10-CM

## 2021-01-07 LAB — HBA1C MFR BLD HPLC: 5.9 %

## 2021-01-07 PROCEDURE — 83036 HEMOGLOBIN GLYCOSYLATED A1C: CPT | Performed by: INTERNAL MEDICINE

## 2021-01-07 PROCEDURE — 99214 OFFICE O/P EST MOD 30 MIN: CPT | Performed by: INTERNAL MEDICINE

## 2021-01-07 RX ORDER — DULAGLUTIDE 0.75 MG/.5ML
0.75 INJECTION, SOLUTION SUBCUTANEOUS
Qty: 4 SYRINGE | Refills: 11 | Status: SHIPPED | OUTPATIENT
Start: 2021-01-07 | End: 2021-05-12 | Stop reason: DRUGHIGH

## 2021-01-07 NOTE — PROGRESS NOTES
Monica Pryor is a 45 y.o. female here for   Chief Complaint   Patient presents with    Diabetes       1. Have you been to the ER, urgent care clinic since your last visit? Hospitalized since your last visit? -no    2. Have you seen or consulted any other health care providers outside of the [address] [place] [address] since your last visit?   Include any pap smears or colon screening.-no

## 2021-01-07 NOTE — PROGRESS NOTES
Lawanda Valle MD      Patient Information  Date:1/7/2021  Name : Lillian Agarwal 45 y.o.     YOB: 1980           Chief Complaint   Patient presents with    Diabetes         History of Present Illness: Lillian Agarwal is a 45 y.o. female here for fu of  Type 2 Diabetes Mellitus. Compliant with her medications  Did not bring the meter  Wants to try GLP-1 agonist  Hirsutism: On spironolactone which is helping  History of tubal ligation  No recent labs  Not able to lose weight, has tried different diets  No hypoglycemia            She was diagnosed with diabetes in February 2016. Wt Readings from Last 3 Encounters:   01/07/21 247 lb 12.8 oz (112.4 kg)   09/02/20 258 lb (117 kg)   12/05/19 260 lb (117.9 kg)       BP Readings from Last 3 Encounters:   09/02/20 109/52   12/05/19 134/63   08/29/19 151/86           Past Medical History:   Diagnosis Date    Carpal tunnel syndrome     Diabetes ([facility])      Current Outpatient Medications   Medication Sig    spironolactone (ALDACTONE) 100 mg tablet Take 1 Tab by mouth nightly.  losartan (COZAAR) 100 mg tablet Take 1 Tab by mouth daily. Stop Hyzaar    linaGLIPtin (Tradjenta) 5 mg tablet TAKE 1 TABLET BY MOUTH EVERY DAY    metFORMIN (GLUCOPHAGE) 1,000 mg tablet TAKE 1 TABLET BY MOUTH TWICE DAILY WITH A MEAL    lancets (ONETOUCH DELICA PLUS LANCET) 33 gauge misc Test once daily Dx Code: E11.65    glucose blood VI test strips (ONETOUCH VERIO) strip Test once daily Dx Code: E11.65    Insulin Needles, Disposable, (JOSE ALBERTO PEN NEEDLE) 32 gauge x 5/32\" ndle With insulin    loratadine (CLARITIN) 10 mg tablet Take 10 mg by mouth daily as needed.  labetaloL (NORMODYNE) 200 mg tablet Take 1 Tab by mouth two (2) times a day.  amLODIPine (NORVASC) 10 mg tablet Take 1 Tab by mouth every morning. No current facility-administered medications for this visit.       No Known Allergies      Review of Systems:  - Per HPI    Physical Examination:   Resp. rate 14, height 5' 4\" (1.626 m), weight 247 lb 12.8 oz (112.4 kg), unknown if currently breastfeeding. Estimated body mass index is 42.53 kg/m² as calculated from the following:    Height as of this encounter: 5' 4\" (1.626 m). -   Weight as of this encounter: 247 lb 12.8 oz (112.4 kg). - General: pleasant, no distress, good eye contact  - HEENT: no exophthalmos, no periorbital edema, EOMI  - Neck: No visible thyromegaly  - RS: Normal respiratory effort  - Musculoskeletal: no tremors  - Neurological: alert and oriented  - Psychiatric: normal mood and affect  - Skin: Normal color    Diabetic foot exam: Sept 2020     Left:     Vibratory sensation normal    Filament test normal sensation with micro filament   Pulse DP: 1+    Deformities: None  Right:    Vibratory sensation normal   Filament test normal sensation with micro filament   Pulse DP: 1+   Deformities: None      Data Reviewed:           Assessment/Plan:     1. Type 2 diabetes mellitus with hyperglycemia, with long-term current use of insulin ([address]    2. Hirsutism    3. Essential hypertension        1. Type 2 Diabetes Mellitus   Lab Results   Component Value Date/Time    Hemoglobin A1c 7.0 (H) 04/10/2020 01:52 PM    Hemoglobin A1c (POC) 6.5 09/02/2020 09:22 AM    Hemoglobin A1c, External 12.6 02/03/2016   Controlled  Metformin, Trulicity, discontinue Tradjenta    FLU annually ,Pneumovax ,aspirin daily,annual eye exam,microalbumin    2. PCOS/hirsutism - had oligomenorrhea , metformin has helped _PCOS  Spironolactone, tubal ligation    3 HTN -losartan, off labetalol    Spironolactone    4. Obesity:Body mass index is 42.53 kg/m². Discussed about the importance of exercise and carbohydrate portion control. Has tried Atkins diet ,ketodiet , Herbal life   At one point she wanted to pursue bariatric surgery      Thank you for allowing me to participate in the care of this patient.     Wilfredo Smith MD      Patient verbalized understanding

## 2021-01-07 NOTE — PROGRESS NOTES
Timoteo Gutierrez is a 45 y.o. female here for   Chief Complaint   Patient presents with    Diabetes       1. Have you been to the ER, urgent care clinic since your last visit? Hospitalized since your last visit? -    2. Have you seen or consulted any other health care providers outside of the [address] [place] [address] since your last visit?   Include any pap smears or colon screening.-

## 2021-01-07 NOTE — LETTER
1/7/2021 Patient: Vero Jones YOB: 1980 Date of Visit: 1/7/2021 Elda Jacobson, [address] [address] [address] [address] Via Fax: [phone] Dear Elda Jacobson, NP, Thank you for referring Ms. Vero Jones to [address] [place] [address] for evaluation. My notes for this consultation are attached. If you have questions, please do not hesitate to call me. I look forward to following your patient along with you. Sincerely, Themla Fay MD

## 2021-02-12 DIAGNOSIS — Z79.4 TYPE 2 DIABETES MELLITUS WITH HYPERGLYCEMIA, WITH LONG-TERM CURRENT USE OF INSULIN (HCC): ICD-10-CM

## 2021-02-12 DIAGNOSIS — E11.65 TYPE 2 DIABETES MELLITUS WITH HYPERGLYCEMIA, WITH LONG-TERM CURRENT USE OF INSULIN (HCC): ICD-10-CM

## 2021-02-12 DIAGNOSIS — E11.65 UNCONTROLLED TYPE 2 DIABETES MELLITUS WITH HYPERGLYCEMIA, WITH LONG-TERM CURRENT USE OF INSULIN (HCC): ICD-10-CM

## 2021-02-12 DIAGNOSIS — Z79.4 UNCONTROLLED TYPE 2 DIABETES MELLITUS WITH HYPERGLYCEMIA, WITH LONG-TERM CURRENT USE OF INSULIN (HCC): ICD-10-CM

## 2021-02-12 DIAGNOSIS — L68.0 HIRSUTISM: ICD-10-CM

## 2021-02-12 RX ORDER — METFORMIN HYDROCHLORIDE 1000 MG/1
TABLET ORAL
Qty: 180 TAB | Refills: 5 | Status: SHIPPED | OUTPATIENT
Start: 2021-02-12 | End: 2022-02-19

## 2021-02-12 RX ORDER — BLOOD SUGAR DIAGNOSTIC
STRIP MISCELLANEOUS
Qty: 100 STRIP | Refills: 3 | Status: SHIPPED | OUTPATIENT
Start: 2021-02-12 | End: 2022-02-19

## 2021-03-03 DIAGNOSIS — Z79.4 TYPE 2 DIABETES MELLITUS WITH HYPERGLYCEMIA, WITH LONG-TERM CURRENT USE OF INSULIN (HCC): ICD-10-CM

## 2021-03-03 DIAGNOSIS — E11.65 TYPE 2 DIABETES MELLITUS WITH HYPERGLYCEMIA, WITH LONG-TERM CURRENT USE OF INSULIN (HCC): ICD-10-CM

## 2021-03-03 RX ORDER — LANCETS 33 GAUGE
EACH MISCELLANEOUS
Qty: 100 LANCET | Refills: 3 | Status: SHIPPED | OUTPATIENT
Start: 2021-03-03 | End: 2021-08-08

## 2021-05-11 ENCOUNTER — TELEPHONE (OUTPATIENT)
Dept: ENDOCRINOLOGY | Age: 41
End: 2021-05-11

## 2021-05-11 DIAGNOSIS — E11.65 TYPE 2 DIABETES MELLITUS WITH HYPERGLYCEMIA, WITH LONG-TERM CURRENT USE OF INSULIN (HCC): Primary | ICD-10-CM

## 2021-05-11 DIAGNOSIS — Z79.4 TYPE 2 DIABETES MELLITUS WITH HYPERGLYCEMIA, WITH LONG-TERM CURRENT USE OF INSULIN (HCC): Primary | ICD-10-CM

## 2021-05-11 NOTE — TELEPHONE ENCOUNTER
I had stopped Tradjenta when started on Trulicity,is she not on Trulicity? Had also sent the prescription for Trulicity with a message to stop Tradjenta    No need for Tradjenta or Januvia when on Trulicity.     Only if she is not on Trulicity then Januvia 076 mg in the morning instead of Tradjenta

## 2021-05-12 ENCOUNTER — OFFICE VISIT (OUTPATIENT)
Dept: ENDOCRINOLOGY | Age: 41
End: 2021-05-12
Payer: COMMERCIAL

## 2021-05-12 VITALS
HEIGHT: 64 IN | HEART RATE: 58 BPM | SYSTOLIC BLOOD PRESSURE: 140 MMHG | DIASTOLIC BLOOD PRESSURE: 73 MMHG | BODY MASS INDEX: 38.76 KG/M2 | OXYGEN SATURATION: 100 % | TEMPERATURE: 97.5 F | WEIGHT: 227 LBS

## 2021-05-12 DIAGNOSIS — I10 ESSENTIAL HYPERTENSION: ICD-10-CM

## 2021-05-12 DIAGNOSIS — E11.65 TYPE 2 DIABETES MELLITUS WITH HYPERGLYCEMIA, UNSPECIFIED WHETHER LONG TERM INSULIN USE (HCC): Primary | ICD-10-CM

## 2021-05-12 LAB
ALBUMIN SERPL-MCNC: 4.6 G/DL (ref 3.8–4.8)
ALBUMIN/CREAT UR: <7 MG/G CREAT (ref 0–29)
ALBUMIN/GLOB SERPL: 1.7 {RATIO} (ref 1.2–2.2)
ALP SERPL-CCNC: 56 IU/L (ref 39–117)
ALT SERPL-CCNC: 12 IU/L (ref 0–32)
AST SERPL-CCNC: 18 IU/L (ref 0–40)
BILIRUB SERPL-MCNC: <0.2 MG/DL (ref 0–1.2)
BUN SERPL-MCNC: 19 MG/DL (ref 6–20)
BUN/CREAT SERPL: 25 (ref 9–23)
CALCIUM SERPL-MCNC: 9.9 MG/DL (ref 8.7–10.2)
CHLORIDE SERPL-SCNC: 101 MMOL/L (ref 96–106)
CO2 SERPL-SCNC: 22 MMOL/L (ref 20–29)
CREAT SERPL-MCNC: 0.76 MG/DL (ref 0.57–1)
CREAT UR-MCNC: 45.8 MG/DL
EST. AVERAGE GLUCOSE BLD GHB EST-MCNC: 131 MG/DL
GLOBULIN SER CALC-MCNC: 2.7 G/DL (ref 1.5–4.5)
GLUCOSE SERPL-MCNC: 84 MG/DL (ref 65–99)
HBA1C MFR BLD: 6.2 % (ref 4.8–5.6)
LDLC SERPL DIRECT ASSAY-MCNC: 60 MG/DL (ref 0–99)
MICROALBUMIN UR-MCNC: <3 UG/ML
POTASSIUM SERPL-SCNC: 4.8 MMOL/L (ref 3.5–5.2)
PROT SERPL-MCNC: 7.3 G/DL (ref 6–8.5)
SODIUM SERPL-SCNC: 135 MMOL/L (ref 134–144)

## 2021-05-12 PROCEDURE — 99214 OFFICE O/P EST MOD 30 MIN: CPT | Performed by: INTERNAL MEDICINE

## 2021-05-12 RX ORDER — DULAGLUTIDE 1.5 MG/.5ML
1.5 INJECTION, SOLUTION SUBCUTANEOUS
Qty: 2 ML | Refills: 11 | Status: SHIPPED | OUTPATIENT
Start: 2021-05-12 | End: 2022-04-10

## 2021-05-12 NOTE — PROGRESS NOTES
[facility] ENDOCRINOLOGY               Janice Kelley MD      Patient Information  Date:5/12/2021  Name : Blanka Kilgore 45 y.o.     YOB: 1980           Chief Complaint   Patient presents with    Diabetes         History of Present Illness: Blanka Kilgore is a 45 y.o. female here for fu of  Type 2 Diabetes Mellitus. Compliant with her medications  Checking once a week   On GLP-1 agonist, tolerating well  Hirsutism: On spironolactone which is helping  History of tubal ligation  Had  labs  She has lost weight  No hypoglycemia            She was diagnosed with diabetes in February 2016. Wt Readings from Last 3 Encounters:   01/07/21 247 lb 12.8 oz (112.4 kg)   09/02/20 258 lb (117 kg)   12/05/19 260 lb (117.9 kg)       BP Readings from Last 3 Encounters:   01/07/21 138/78   09/02/20 109/52   12/05/19 134/63           Past Medical History:   Diagnosis Date    Carpal tunnel syndrome     Diabetes ([facility] [address]      Current Outpatient Medications   Medication Sig    lancets (OneTouch Delica Plus Lancet) 33 gauge misc Test once daily Dx Code: E11.65    metFORMIN (GLUCOPHAGE) 1,000 mg tablet Take 1 tab BID    glucose blood VI test strips (OneTouch Verio test strips) strip Test once daily Dx Code: E11.65    dulaglutide (Trulicity) 9.70 GO/3.3 mL sub-q pen 0.5 mL by SubCUTAneous route every seven (7) days. Stop Tradjenta    spironolactone (ALDACTONE) 100 mg tablet Take 1 Tab by mouth nightly.  losartan (COZAAR) 100 mg tablet Take 1 Tab by mouth daily. Stop Hyzaar    Insulin Needles, Disposable, (JOSE ALBERTO PEN NEEDLE) 32 gauge x 5/32\" ndle With insulin    loratadine (CLARITIN) 10 mg tablet Take 10 mg by mouth daily as needed. No current facility-administered medications for this visit. No Known Allergies      Review of Systems:  - Per HPI    Physical Examination:   Height 5' 4\" (1.626 m), unknown if currently breastfeeding.  Estimated body mass index is 42.53 kg/m² as calculated from the following:    Height as of this encounter: 5' 4\" (1.626 m). -   Weight as of 1/7/21: 247 lb 12.8 oz (112.4 kg). - General: pleasant, no distress, good eye contact  - HEENT: no exophthalmos, no periorbital edema, EOMI  - CVS -S1-S2 regular  - RS: Normal respiratory effort  - Musculoskeletal: no tremors  - Neurological: alert and oriented  - Psychiatric: normal mood and affect  - Skin: Normal color    Diabetic foot exam: Sept 2020     Left:     Vibratory sensation normal    Filament test normal sensation with micro filament   Pulse DP: 1+    Deformities: None  Right:    Vibratory sensation normal   Filament test normal sensation with micro filament   Pulse DP: 1+   Deformities: None      Data Reviewed:           Assessment/Plan:     1. Type 2 diabetes mellitus with hyperglycemia, unspecified whether long term insulin use ([facility] [address]    2. Essential hypertension        1. Type 2 Diabetes Mellitus   Lab Results   Component Value Date/Time    Hemoglobin A1c 7.0 (H) 04/10/2020 01:52 PM    Hemoglobin A1c (POC) 5.9 01/07/2021 10:20 AM    Hemoglobin A1c, External 12.6 02/03/2016   Controlled  Metformin, Trulicity, discontinued Tradjenta    FLU annually ,Pneumovax ,aspirin daily,annual eye exam,microalbumin    2. PCOS/hirsutism - had oligomenorrhea , metformin has helped _PCOS  Spironolactone, tubal ligation    3 HTN -losartan, off labetalol  Spironolactone    4. Obesity:Body mass index is 42.53 kg/m². Discussed about the importance of exercise and carbohydrate portion control. Has tried Atkins diet ,ketodiet , Herbal life   At one point she wanted to pursue bariatric surgery      Thank you for allowing me to participate in the care of this patient.     Fidel Alegria MD      Patient verbalized understanding

## 2021-05-12 NOTE — LETTER
5/12/2021 Patient: Toi Frankel YOB: 1980 Date of Visit: 5/12/2021 Valente Teague, [address] [address] [address] [address] Via Fax: [phone] Dear Valente Teague DO, Thank you for referring Ms. Toi Frankel to [address] [place] [address] for evaluation. My notes for this consultation are attached. If you have questions, please do not hesitate to call me. I look forward to following your patient along with you. Sincerely, Ab Luis MD

## 2021-08-07 DIAGNOSIS — E11.65 TYPE 2 DIABETES MELLITUS WITH HYPERGLYCEMIA, WITH LONG-TERM CURRENT USE OF INSULIN (HCC): ICD-10-CM

## 2021-08-07 DIAGNOSIS — Z79.4 TYPE 2 DIABETES MELLITUS WITH HYPERGLYCEMIA, WITH LONG-TERM CURRENT USE OF INSULIN (HCC): ICD-10-CM

## 2021-08-08 RX ORDER — LANCETS 33 GAUGE
EACH MISCELLANEOUS
Qty: 100 PACKAGE | Refills: 3 | Status: SHIPPED | OUTPATIENT
Start: 2021-08-08 | End: 2021-12-05

## 2021-09-20 ENCOUNTER — OFFICE VISIT (OUTPATIENT)
Dept: ENDOCRINOLOGY | Age: 41
End: 2021-09-20
Payer: COMMERCIAL

## 2021-09-20 VITALS
HEART RATE: 68 BPM | HEIGHT: 64 IN | WEIGHT: 210.9 LBS | RESPIRATION RATE: 16 BRPM | OXYGEN SATURATION: 100 % | TEMPERATURE: 97.4 F | DIASTOLIC BLOOD PRESSURE: 75 MMHG | SYSTOLIC BLOOD PRESSURE: 130 MMHG | BODY MASS INDEX: 36 KG/M2

## 2021-09-20 DIAGNOSIS — E11.65 TYPE 2 DIABETES MELLITUS WITH HYPERGLYCEMIA, WITH LONG-TERM CURRENT USE OF INSULIN (HCC): Primary | ICD-10-CM

## 2021-09-20 DIAGNOSIS — I10 ESSENTIAL HYPERTENSION: ICD-10-CM

## 2021-09-20 DIAGNOSIS — L68.0 HIRSUTISM: ICD-10-CM

## 2021-09-20 DIAGNOSIS — Z79.4 TYPE 2 DIABETES MELLITUS WITH HYPERGLYCEMIA, WITH LONG-TERM CURRENT USE OF INSULIN (HCC): Primary | ICD-10-CM

## 2021-09-20 LAB — HBA1C MFR BLD HPLC: 5.8 %

## 2021-09-20 PROCEDURE — 83036 HEMOGLOBIN GLYCOSYLATED A1C: CPT | Performed by: INTERNAL MEDICINE

## 2021-09-20 PROCEDURE — 99214 OFFICE O/P EST MOD 30 MIN: CPT | Performed by: INTERNAL MEDICINE

## 2021-09-20 NOTE — LETTER
9/20/2021    Patient: Polly Araujo   YOB: 1980   Date of Visit: 9/20/2021     [address], [address] [facility] [address]  Via Fax: [phone]    Dear Jack Monroy,      Thank you for referring Ms. Polly Araujo to [id number] [place] [address] for evaluation. My notes for this consultation are attached. If you have questions, please do not hesitate to call me. I look forward to following your patient along with you.       Sincerely,    Felicia Ruggiero MD

## 2021-09-20 NOTE — PROGRESS NOTES
Micki Hidalgo is a 37 y.o. female here for   Chief Complaint   Patient presents with    Diabetes       1. Have you been to the ER, urgent care clinic since your last visit? Hospitalized since your last visit? - no    2. Have you seen or consulted any other health care providers outside of the [address] [place] [address] since your last visit?   Include any pap smears or colon screening.- no  Order placed for pt,  per Verbal Order with read back from Dr Kassy Araujo 9/20/2021

## 2021-09-20 NOTE — PROGRESS NOTES
[facility]               Dave Ramirez MD      Patient Information  Date:9/20/2021  Name : Ephraim Ellis 37 y.o.     YOB: 1980           Chief Complaint   Patient presents with    Diabetes         History of Present Illness: Ephraim Ellis is a 37 y.o. female here for fu of  Type 2 Diabetes Mellitus. Compliant with her medications  Checking once a week   On GLP-1 agonist, tolerating well  Hirsutism: On spironolactone which is helping  History of tubal ligation  Kick boxing 3 /week   She has lost weight, goal weight is 180 lbs   No hypoglycemia    She was diagnosed with diabetes in February 2016. Wt Readings from Last 3 Encounters:   09/20/21 210 lb 14.4 oz (95.7 kg)   05/12/21 227 lb (103 kg)   01/07/21 247 lb 12.8 oz (112.4 kg)       BP Readings from Last 3 Encounters:   09/20/21 130/75   05/12/21 (!) 140/73   01/07/21 138/78           Past Medical History:   Diagnosis Date    Carpal tunnel syndrome     Diabetes ([address]      Current Outpatient Medications   Medication Sig    lancets (One Touch Delica) 33 gauge misc USE TO CHECK BLOOD SUGAR ONCE A DAY    dulaglutide (Trulicity) 1.5 KJ/6.2 mL sub-q pen 0.5 mL by SubCUTAneous route every seven (7) days. STOP 0.75 MCG AND TRADJENTA    metFORMIN (GLUCOPHAGE) 1,000 mg tablet Take 1 tab BID    glucose blood VI test strips (OneTouch Verio test strips) strip Test once daily Dx Code: E11.65    spironolactone (ALDACTONE) 100 mg tablet Take 1 Tab by mouth nightly.  losartan (COZAAR) 100 mg tablet Take 1 Tab by mouth daily. Stop Hyzaar    Insulin Needles, Disposable, (JOSE ALBERTO PEN NEEDLE) 32 gauge x 5/32\" ndle With insulin    loratadine (CLARITIN) 10 mg tablet Take 10 mg by mouth daily as needed. No current facility-administered medications for this visit.      No Known Allergies      Review of Systems:  - Per HPI    Physical Examination:   Blood pressure 130/75, pulse 68, temperature 97.4 °F (36.3 °C), temperature source Temporal, resp. rate 16, height 5' 4\" (1.626 m), weight 210 lb 14.4 oz (95.7 kg), SpO2 100 %, unknown if currently breastfeeding. Estimated body mass index is 36.2 kg/m² as calculated from the following:    Height as of this encounter: 5' 4\" (1.626 m). -   Weight as of this encounter: 210 lb 14.4 oz (95.7 kg). - General: pleasant, no distress, good eye contact  - HEENT: no exophthalmos, no periorbital edema, EOMI  - CVS -S1-S2 regular  - RS: Normal respiratory effort  - Musculoskeletal: no tremors  - Neurological: alert and oriented  - Psychiatric: normal mood and affect  - Skin: Normal color    Diabetic foot exam: Sept 2021    Left:     Vibratory sensation normal    Filament test normal sensation with micro filament   Pulse DP: 1+    Deformities: None  Right:    Vibratory sensation normal   Filament test normal sensation with micro filament   Pulse DP: 1+   Deformities: None      Data Reviewed:           Assessment/Plan:     1. Type 2 diabetes mellitus with hyperglycemia, with long-term current use of insulin ([address]    2. Essential hypertension    3. Hirsutism        1. Type 2 Diabetes Mellitus   Lab Results   Component Value Date/Time    Hemoglobin A1c 6.2 (H) 05/11/2021 11:06 AM    Hemoglobin A1c (POC) 5.9 01/07/2021 10:20 AM    Hemoglobin A1c, External 12.6 02/03/2016 12:00 AM   Controlled  Metformin, Trulicity - nausea with higher dose, wants to continue. FLU annually ,Pneumovax ,aspirin daily,annual eye exam,microalbumin    2. PCOS/hirsutism - had oligomenorrhea , metformin has helped _PCOS  Spironolactone, tubal ligation    3 HTN -losartan, off labetalol  Spironolactone    4. Obesity:Body mass index is 36.2 kg/m². Discussed about the importance of exercise and carbohydrate portion control. Has tried Atkins diet ,ketodiet , Herbal life   At one point she wanted to pursue bariatric surgery    5. Dyslipidemia - declined statins , discussed benefits       Thank you for allowing me to participate in the care of this patient.     Alessandro Fernandez MD      Patient verbalized understanding

## 2021-10-14 ENCOUNTER — TELEPHONE (OUTPATIENT)
Dept: ENDOCRINOLOGY | Age: 41
End: 2021-10-14

## 2021-10-14 NOTE — TELEPHONE ENCOUNTER
Patient dropped off form permitting her to work from home being dm . She is asking for a call once form complete and she will .  She was advised 7-14 days for completion

## 2021-10-15 NOTE — TELEPHONE ENCOUNTER
Paperwork ready for . Attempted to call. Unsuccessful. Left msg for Lacey Valdez informing her of above. A callback number was left for any questions or concerns.

## 2021-12-02 DIAGNOSIS — Z79.4 TYPE 2 DIABETES MELLITUS WITH HYPERGLYCEMIA, WITH LONG-TERM CURRENT USE OF INSULIN (HCC): ICD-10-CM

## 2021-12-02 DIAGNOSIS — E11.65 TYPE 2 DIABETES MELLITUS WITH HYPERGLYCEMIA, WITH LONG-TERM CURRENT USE OF INSULIN (HCC): ICD-10-CM

## 2021-12-05 RX ORDER — LANCETS 33 GAUGE
EACH MISCELLANEOUS
Qty: 100 LANCET | Refills: 3 | Status: SHIPPED | OUTPATIENT
Start: 2021-12-05

## 2021-12-13 DIAGNOSIS — Z79.4 TYPE 2 DIABETES MELLITUS WITH HYPERGLYCEMIA, WITH LONG-TERM CURRENT USE OF INSULIN (HCC): ICD-10-CM

## 2021-12-13 DIAGNOSIS — E11.65 TYPE 2 DIABETES MELLITUS WITH HYPERGLYCEMIA, WITH LONG-TERM CURRENT USE OF INSULIN (HCC): ICD-10-CM

## 2021-12-13 RX ORDER — SPIRONOLACTONE 100 MG/1
TABLET, FILM COATED ORAL
Qty: 30 TABLET | Refills: 11 | Status: SHIPPED | OUTPATIENT
Start: 2021-12-13

## 2022-01-01 DIAGNOSIS — E11.65 TYPE 2 DIABETES MELLITUS WITH HYPERGLYCEMIA, WITHOUT LONG-TERM CURRENT USE OF INSULIN (HCC): ICD-10-CM

## 2022-01-01 RX ORDER — LOSARTAN POTASSIUM 100 MG/1
100 TABLET ORAL DAILY
Qty: 30 TABLET | Refills: 11 | Status: SHIPPED | OUTPATIENT
Start: 2022-01-01

## 2022-01-05 ENCOUNTER — TELEPHONE (OUTPATIENT)
Dept: ENDOCRINOLOGY | Age: 42
End: 2022-01-05

## 2022-01-05 NOTE — TELEPHONE ENCOUNTER
Patient requested a message sent to nurse to check on status of paper work stating it has been over a week since drop off.  She is aware of 7-14 days informed her she will get a call once complete

## 2022-01-06 NOTE — TELEPHONE ENCOUNTER
Informed pt that paperwork has been sitting at the  since before Christmas. Pt verbalized understanding.

## 2022-01-20 LAB
BUN SERPL-MCNC: 21 MG/DL (ref 6–24)
BUN/CREAT SERPL: 24 (ref 9–23)
CALCIUM SERPL-MCNC: 10 MG/DL (ref 8.7–10.2)
CHLORIDE SERPL-SCNC: 101 MMOL/L (ref 96–106)
CO2 SERPL-SCNC: 20 MMOL/L (ref 20–29)
CREAT SERPL-MCNC: 0.86 MG/DL (ref 0.57–1)
EST. AVERAGE GLUCOSE BLD GHB EST-MCNC: 105 MG/DL
GLUCOSE SERPL-MCNC: 88 MG/DL (ref 65–99)
HBA1C MFR BLD: 5.3 % (ref 4.8–5.6)
LDLC SERPL DIRECT ASSAY-MCNC: 41 MG/DL (ref 0–99)
POTASSIUM SERPL-SCNC: 5 MMOL/L (ref 3.5–5.2)
SODIUM SERPL-SCNC: 134 MMOL/L (ref 134–144)

## 2022-01-25 ENCOUNTER — OFFICE VISIT (OUTPATIENT)
Dept: ENDOCRINOLOGY | Age: 42
End: 2022-01-25
Payer: COMMERCIAL

## 2022-01-25 VITALS
OXYGEN SATURATION: 100 % | TEMPERATURE: 98.1 F | DIASTOLIC BLOOD PRESSURE: 52 MMHG | SYSTOLIC BLOOD PRESSURE: 100 MMHG | HEIGHT: 64 IN | WEIGHT: 200 LBS | BODY MASS INDEX: 34.15 KG/M2 | HEART RATE: 57 BPM | RESPIRATION RATE: 16 BRPM

## 2022-01-25 DIAGNOSIS — E11.65 TYPE 2 DIABETES MELLITUS WITH HYPERGLYCEMIA, UNSPECIFIED WHETHER LONG TERM INSULIN USE (HCC): Primary | ICD-10-CM

## 2022-01-25 DIAGNOSIS — R00.1 BRADYCARDIA: ICD-10-CM

## 2022-01-25 DIAGNOSIS — I10 ESSENTIAL HYPERTENSION: ICD-10-CM

## 2022-01-25 PROCEDURE — 99214 OFFICE O/P EST MOD 30 MIN: CPT | Performed by: INTERNAL MEDICINE

## 2022-01-25 NOTE — PROGRESS NOTES
[facility]               Hu Muller MD      Patient Information  Date:1/25/2022  Name : Vicenta Mcfadden 37 y.o.     YOB: 1980           Chief Complaint   Patient presents with    Diabetes         History of Present Illness: Vicenta Mcfadden is a 37 y.o. female here for fu of  Type 2 Diabetes Mellitus. Compliant with her medications  Checking once a week   On GLP-1 agonist, tolerating well  Hirsutism: On spironolactone which is helping  History of tubal ligation  Weight lifting and cardio  She has lost weight, goal weight is 180 lbs   No hypoglycemia    She was diagnosed with diabetes in February 2016. Wt Readings from Last 3 Encounters:   01/25/22 200 lb (90.7 kg)   09/20/21 210 lb 14.4 oz (95.7 kg)   05/12/21 227 lb (103 kg)       BP Readings from Last 3 Encounters:   01/25/22 (!) 100/52   09/20/21 130/75   05/12/21 (!) 140/73           Past Medical History:   Diagnosis Date    Carpal tunnel syndrome     Diabetes ([address]      Current Outpatient Medications   Medication Sig    losartan (COZAAR) 100 mg tablet TAKE 1 TAB BY MOUTH DAILY. STOP HYZAAR    spironolactone (ALDACTONE) 100 mg tablet TAKE 1 TABLET BY MOUTH EVERY DAY AT NIGHT    OneTouch Delica Plus Lancet 33 gauge misc USE TO CHECK BLOOD SUGAR ONCE A DAY    dulaglutide (Trulicity) 1.5 YS/8.6 mL sub-q pen 0.5 mL by SubCUTAneous route every seven (7) days. STOP 0.75 MCG AND TRADJENTA    metFORMIN (GLUCOPHAGE) 1,000 mg tablet Take 1 tab BID    glucose blood VI test strips (OneTouch Verio test strips) strip Test once daily Dx Code: E11.65    Insulin Needles, Disposable, (JOSE ALBERTO PEN NEEDLE) 32 gauge x 5/32\" ndle With insulin    loratadine (CLARITIN) 10 mg tablet Take 10 mg by mouth daily as needed. No current facility-administered medications for this visit.      No Known Allergies      Review of Systems:  - Per HPI    Physical Examination:   Blood pressure (!) 100/52, pulse (!) 57, temperature 98.1 °F (36.7 °C), temperature source Temporal, resp. rate 16, height 5' 4\" (1.626 m), weight 200 lb (90.7 kg), SpO2 100 %, unknown if currently breastfeeding. Estimated body mass index is 34.33 kg/m² as calculated from the following:    Height as of this encounter: 5' 4\" (1.626 m). -   Weight as of this encounter: 200 lb (90.7 kg). - General: pleasant, no distress, good eye contact  - HEENT: no exophthalmos, no periorbital edema, EOMI  - CVS -S1-S2 regular  - RS: Normal respiratory effort  - Musculoskeletal: no tremors  - Neurological: alert and oriented  - Psychiatric: normal mood and affect  - Skin: Normal color    Diabetic foot exam: Sept 2021    Left:     Vibratory sensation normal    Filament test normal sensation with micro filament   Pulse DP: 1+    Deformities: None  Right:    Vibratory sensation normal   Filament test normal sensation with micro filament   Pulse DP: 1+   Deformities: None      Data Reviewed:           Assessment/Plan:     1. Type 2 diabetes mellitus with hyperglycemia, unspecified whether long term insulin use ([facility]    2. Essential hypertension        1. Type 2 Diabetes Mellitus   Lab Results   Component Value Date/Time    Hemoglobin A1c 5.3 01/19/2022 10:58 AM    Hemoglobin A1c (POC) 5.8 09/20/2021 10:04 AM    Hemoglobin A1c, External 12.6 02/03/2016 12:00 AM   Controlled  Metformin, Trulicity - nausea with higher dose    FLU annually ,Pneumovax ,aspirin daily,annual eye exam,microalbumin    2. PCOS/hirsutism - had oligomenorrhea , metformin has helped _PCOS  Spironolactone, tubal ligation      3 HTN -losartan, off labetalol  Spironolactone  If blood pressure is persistently low, decrease losartan to half a tablet    4. Obesity:Body mass index is 34.33 kg/m². Discussed about the importance of exercise and carbohydrate portion control. Has tried Atkins diet ,ketodiet , Herbal life   At one point she wanted to pursue bariatric surgery    5. Dyslipidemia - declined statins , discussed benefits Thank you for allowing me to participate in the care of this patient.     Olen Scheuermann, MD      Patient verbalized understanding

## 2022-01-25 NOTE — PROGRESS NOTES
Brenda Pena is a 37 y.o. female here for   Chief Complaint   Patient presents with    Diabetes       1. Have you been to the ER, urgent care clinic since your last visit? Hospitalized since your last visit? -no    2. Have you seen or consulted any other health care providers outside of the [address] [place] [address] since your last visit?   Include any pap smears or colon screening.-nno

## 2022-01-25 NOTE — LETTER
1/25/2022    Patient: Radha Orozco   YOB: 1980   Date of Visit: 1/25/2022     Nette Agarwal, [address] [address]  Via Fax: [phone]    Dear Figueroa Gipson,      Thank you for referring Ms. Radha Orozco to [address] [place] [address] for evaluation. My notes for this consultation are attached. If you have questions, please do not hesitate to call me. I look forward to following your patient along with you.       Sincerely,    Gavi Au MD

## 2022-02-19 DIAGNOSIS — Z79.4 UNCONTROLLED TYPE 2 DIABETES MELLITUS WITH HYPERGLYCEMIA, WITH LONG-TERM CURRENT USE OF INSULIN (HCC): ICD-10-CM

## 2022-02-19 DIAGNOSIS — Z79.4 TYPE 2 DIABETES MELLITUS WITH HYPERGLYCEMIA, WITH LONG-TERM CURRENT USE OF INSULIN (HCC): ICD-10-CM

## 2022-02-19 DIAGNOSIS — E11.65 TYPE 2 DIABETES MELLITUS WITH HYPERGLYCEMIA, WITH LONG-TERM CURRENT USE OF INSULIN (HCC): ICD-10-CM

## 2022-02-19 DIAGNOSIS — E11.65 UNCONTROLLED TYPE 2 DIABETES MELLITUS WITH HYPERGLYCEMIA, WITH LONG-TERM CURRENT USE OF INSULIN (HCC): ICD-10-CM

## 2022-02-19 DIAGNOSIS — L68.0 HIRSUTISM: ICD-10-CM

## 2022-02-19 RX ORDER — BLOOD SUGAR DIAGNOSTIC
STRIP MISCELLANEOUS
Qty: 25 STRIP | Refills: 15 | Status: SHIPPED | OUTPATIENT
Start: 2022-02-19

## 2022-02-19 RX ORDER — METFORMIN HYDROCHLORIDE 1000 MG/1
TABLET ORAL
Qty: 180 TABLET | Refills: 5 | Status: SHIPPED | OUTPATIENT
Start: 2022-02-19

## 2022-03-18 PROBLEM — G56.00 CARPAL TUNNEL SYNDROME: Status: ACTIVE | Noted: 2019-04-16

## 2022-03-19 PROBLEM — I10 ESSENTIAL HYPERTENSION: Status: ACTIVE | Noted: 2017-09-28

## 2022-03-19 PROBLEM — Z79.4 TYPE 2 DIABETES MELLITUS WITH HYPERGLYCEMIA, WITH LONG-TERM CURRENT USE OF INSULIN (HCC): Status: ACTIVE | Noted: 2017-06-30

## 2022-03-19 PROBLEM — E11.65 TYPE 2 DIABETES MELLITUS WITH HYPERGLYCEMIA, WITH LONG-TERM CURRENT USE OF INSULIN (HCC): Status: ACTIVE | Noted: 2017-06-30

## 2022-03-19 PROBLEM — E66.01 MORBID OBESITY DUE TO EXCESS CALORIES (HCC): Status: ACTIVE | Noted: 2017-02-13

## 2022-04-10 DIAGNOSIS — E11.65 TYPE 2 DIABETES MELLITUS WITH HYPERGLYCEMIA, WITH LONG-TERM CURRENT USE OF INSULIN (HCC): ICD-10-CM

## 2022-04-10 DIAGNOSIS — Z79.4 TYPE 2 DIABETES MELLITUS WITH HYPERGLYCEMIA, WITH LONG-TERM CURRENT USE OF INSULIN (HCC): ICD-10-CM

## 2022-04-10 RX ORDER — DULAGLUTIDE 1.5 MG/.5ML
INJECTION, SOLUTION SUBCUTANEOUS
Qty: 6 ML | Refills: 3 | Status: SHIPPED | OUTPATIENT
Start: 2022-04-10

## 2022-05-25 ENCOUNTER — OFFICE VISIT (OUTPATIENT)
Dept: ENDOCRINOLOGY | Age: 42
End: 2022-05-25
Payer: COMMERCIAL

## 2022-05-25 VITALS
WEIGHT: 200 LBS | OXYGEN SATURATION: 100 % | HEART RATE: 57 BPM | TEMPERATURE: 98 F | RESPIRATION RATE: 16 BRPM | BODY MASS INDEX: 34.15 KG/M2 | HEIGHT: 64 IN | DIASTOLIC BLOOD PRESSURE: 60 MMHG | SYSTOLIC BLOOD PRESSURE: 116 MMHG

## 2022-05-25 DIAGNOSIS — I10 ESSENTIAL HYPERTENSION: ICD-10-CM

## 2022-05-25 DIAGNOSIS — Z79.4 TYPE 2 DIABETES MELLITUS WITH HYPERGLYCEMIA, WITH LONG-TERM CURRENT USE OF INSULIN (HCC): Primary | ICD-10-CM

## 2022-05-25 DIAGNOSIS — L68.0 HIRSUTISM: ICD-10-CM

## 2022-05-25 DIAGNOSIS — E11.65 TYPE 2 DIABETES MELLITUS WITH HYPERGLYCEMIA, WITH LONG-TERM CURRENT USE OF INSULIN (HCC): Primary | ICD-10-CM

## 2022-05-25 LAB — HBA1C MFR BLD HPLC: 5.3 %

## 2022-05-25 PROCEDURE — 3044F HG A1C LEVEL LT 7.0%: CPT | Performed by: INTERNAL MEDICINE

## 2022-05-25 PROCEDURE — 99214 OFFICE O/P EST MOD 30 MIN: CPT | Performed by: INTERNAL MEDICINE

## 2022-05-25 PROCEDURE — 83036 HEMOGLOBIN GLYCOSYLATED A1C: CPT | Performed by: INTERNAL MEDICINE

## 2022-05-25 NOTE — PROGRESS NOTES
Humberto Mcadams MD      Patient Information  Date:5/25/2022  Name : Nani Cerda 40 y.o.     YOB: 1980           Chief Complaint   Patient presents with    Diabetes         History of Present Illness: Nani Cerda is a 40 y.o. female here for fu of  Type 2 Diabetes Mellitus. Compliant with her medications  Checking once a week   On GLP-1 agonist, tolerating well  Hirsutism: On spironolactone which is helping  History of tubal ligation  Weight lifting and cardio  ,maintaining weight ,goal weight is 180 lbs   No hypoglycemia    She was diagnosed with diabetes in February 2016. Wt Readings from Last 3 Encounters:   05/25/22 200 lb (90.7 kg)   01/25/22 200 lb (90.7 kg)   09/20/21 210 lb 14.4 oz (95.7 kg)       BP Readings from Last 3 Encounters:   05/25/22 116/60   01/25/22 (!) 100/52   09/20/21 130/75           Past Medical History:   Diagnosis Date    Carpal tunnel syndrome     Diabetes ([facility]      Current Outpatient Medications   Medication Sig    Trulicity 1.5 IK/5.3 mL sub-q pen INJECT 0.5ML UNDER THE SKIN EVERY 7 DAYS, STOP 0.75MG AND TRADJENTA    metFORMIN (GLUCOPHAGE) 1,000 mg tablet TAKE 1 TABLET BY MOUTH TWICE A DAY    OneTouch Verio test strips strip TEST ONCE DAILY DX CODE: E11.65    losartan (COZAAR) 100 mg tablet TAKE 1 TAB BY MOUTH DAILY. STOP HYZAAR    spironolactone (ALDACTONE) 100 mg tablet TAKE 1 TABLET BY MOUTH EVERY DAY AT NIGHT    OneTouch Delica Plus Lancet 33 gauge misc USE TO CHECK BLOOD SUGAR ONCE A DAY    loratadine (CLARITIN) 10 mg tablet Take 10 mg by mouth daily as needed.  Insulin Needles, Disposable, (JOSE ALBERTO PEN NEEDLE) 32 gauge x 5/32\" ndle With insulin (Patient not taking: Reported on 5/25/2022)     No current facility-administered medications for this visit.      No Known Allergies      Review of Systems:  - Per HPI    Physical Examination:   Blood pressure 116/60, pulse (!) 57, temperature 98 °F (36.7 °C), temperature source Temporal, resp. rate 16, height 5' 4\" (1.626 m), weight 200 lb (90.7 kg), SpO2 100 %, unknown if currently breastfeeding. Estimated body mass index is 34.33 kg/m² as calculated from the following:    Height as of this encounter: 5' 4\" (1.626 m). -   Weight as of this encounter: 200 lb (90.7 kg). - General: pleasant, no distress, good eye contact  - HEENT: no exophthalmos, no periorbital edema, EOMI  - CVS -S1-S2 regular  - RS: Normal respiratory effort  - Musculoskeletal: no tremors  - Neurological: alert and oriented  - Psychiatric: normal mood and affect  - Skin: Normal color    Diabetic foot exam: Sept 2021    Left:     Vibratory sensation normal    Filament test normal sensation with micro filament   Pulse DP: 1+    Deformities: None  Right:    Vibratory sensation normal   Filament test normal sensation with micro filament   Pulse DP: 1+   Deformities: None      Data Reviewed:           Assessment/Plan:     1. Type 2 diabetes mellitus with hyperglycemia, with long-term current use of insulin ([facility])        1. Type 2 Diabetes Mellitus   Lab Results   Component Value Date/Time    Hemoglobin A1c 5.3 01/19/2022 10:58 AM    Hemoglobin A1c (POC) 5.8 09/20/2021 10:04 AM    Hemoglobin A1c, External 12.6 02/03/2016 12:00 AM   Controlled  Metformin, Trulicity - nausea with higher dose    FLU annually ,Pneumovax ,aspirin daily,annual eye exam,microalbumin    2. PCOS/hirsutism - had oligomenorrhea , metformin has helped _PCOS  Spironolactone, tubal ligation      3 HTN -losartan, off labetalol  Spironolactone  If blood pressure is persistently low, decrease losartan to half a tablet    4. Obesity:Body mass index is 34.33 kg/m². Discussed about the importance of exercise and carbohydrate portion control. Has tried Atkins diet ,ketodiet , Herbal life   At one point she wanted to pursue bariatric surgery    5. Dyslipidemia - declined statins , discussed benefits       Thank you for allowing me to participate in the care of this patient.     Cruz Linares MD      Patient verbalized understanding

## 2022-05-25 NOTE — LETTER
5/25/2022    Patient: Nani Cerda   YOB: 1980   Date of Visit: 5/25/2022     [address] [address], [address] [facility] [address]  Via Fax: [phone]    Dear [address] [place] [address], DO,      Thank you for referring Ms. Nani Cerda to [address] [place] [address] for evaluation. My notes for this consultation are attached. If you have questions, please do not hesitate to call me. I look forward to following your patient along with you.       Sincerely,    Nathan Haley MD

## 2022-05-25 NOTE — PROGRESS NOTES
Lashay Sanchez is a 40 y.o. female here for   Chief Complaint   Patient presents with    Diabetes       1. Have you been to the ER, urgent care clinic since your last visit? Hospitalized since your last visit? -no    2. Have you seen or consulted any other health care providers outside of the [address] [place] [address] since your last visit?   Include any pap smears or colon screening[facility]

## 2022-09-06 ENCOUNTER — TELEPHONE (OUTPATIENT)
Dept: ENDOCRINOLOGY | Age: 42
End: 2022-09-06

## 2022-09-06 NOTE — TELEPHONE ENCOUNTER
Pt states she dropped her last trulicity insulin pen and is wondering what she can do. Please advise pt.  JACLYN

## 2022-09-07 NOTE — TELEPHONE ENCOUNTER
Attempted to call. Unsuccessful. Left msg for Jacky Murray to give us a call back at the office. A callback number was left.

## 2022-09-07 NOTE — TELEPHONE ENCOUNTER
Attempted to call. Unsuccessful. Left [facility] for Nani Hubbard to give us a call back at the office. A callback number was left.

## 2022-09-08 NOTE — TELEPHONE ENCOUNTER
Attempted to call. Unsuccessful. Left msg for Monica Pryor to give us a call back at the office. A callback number was left.

## 2022-12-09 DIAGNOSIS — E11.65 TYPE 2 DIABETES MELLITUS WITH HYPERGLYCEMIA, WITH LONG-TERM CURRENT USE OF INSULIN (HCC): ICD-10-CM

## 2022-12-09 DIAGNOSIS — Z79.4 TYPE 2 DIABETES MELLITUS WITH HYPERGLYCEMIA, WITH LONG-TERM CURRENT USE OF INSULIN (HCC): ICD-10-CM

## 2022-12-09 RX ORDER — BLOOD SUGAR DIAGNOSTIC
STRIP MISCELLANEOUS
Qty: 50 STRIP | Refills: 11 | Status: SHIPPED | OUTPATIENT
Start: 2022-12-09

## 2023-01-10 DIAGNOSIS — E11.65 TYPE 2 DIABETES MELLITUS WITH HYPERGLYCEMIA, WITH LONG-TERM CURRENT USE OF INSULIN (HCC): ICD-10-CM

## 2023-01-10 DIAGNOSIS — Z79.4 TYPE 2 DIABETES MELLITUS WITH HYPERGLYCEMIA, WITH LONG-TERM CURRENT USE OF INSULIN (HCC): ICD-10-CM

## 2023-01-10 DIAGNOSIS — E11.65 TYPE 2 DIABETES MELLITUS WITH HYPERGLYCEMIA, WITHOUT LONG-TERM CURRENT USE OF INSULIN (HCC): ICD-10-CM

## 2023-01-11 RX ORDER — LOSARTAN POTASSIUM 100 MG/1
TABLET ORAL
Qty: 90 TABLET | Refills: 3 | Status: SHIPPED | OUTPATIENT
Start: 2023-01-11

## 2023-01-11 RX ORDER — SPIRONOLACTONE 100 MG/1
TABLET, FILM COATED ORAL
Qty: 90 TABLET | Refills: 3 | Status: SHIPPED | OUTPATIENT
Start: 2023-01-11

## 2023-02-15 ENCOUNTER — HOSPITAL ENCOUNTER (EMERGENCY)
Age: 43
Discharge: HOME | End: 2023-02-15
Payer: COMMERCIAL

## 2023-02-15 VITALS
SYSTOLIC BLOOD PRESSURE: 149 MMHG | OXYGEN SATURATION: 100 % | WEIGHT: 200 LBS | HEIGHT: 64 IN | DIASTOLIC BLOOD PRESSURE: 76 MMHG | RESPIRATION RATE: 16 BRPM | TEMPERATURE: 99.4 F | BODY MASS INDEX: 34.15 KG/M2 | HEART RATE: 77 BPM

## 2023-02-15 DIAGNOSIS — B34.9 VIRAL ILLNESS: Primary | ICD-10-CM

## 2023-02-15 PROCEDURE — 99281 EMR DPT VST MAYX REQ PHY/QHP: CPT

## 2023-02-15 PROCEDURE — 99283 EMERGENCY DEPT VISIT LOW MDM: CPT

## 2023-02-15 RX ORDER — IBUPROFEN 600 MG/1
600 TABLET ORAL
Qty: 30 TABLET | Refills: 0 | Status: SHIPPED | OUTPATIENT
Start: 2023-02-15

## 2023-02-15 RX ORDER — LORATADINE 10 MG/1
10 TABLET ORAL
Qty: 30 TABLET | Refills: 0 | Status: SHIPPED | OUTPATIENT
Start: 2023-02-15

## 2023-02-15 RX ORDER — SODIUM CHLORIDE 0.65 %
2 DROPS NASAL
Qty: 30 ML | Refills: 0 | Status: SHIPPED | OUTPATIENT
Start: 2023-02-15 | End: 2023-03-06

## 2023-02-15 RX ORDER — ACETAMINOPHEN 500 MG
1000 TABLET ORAL
Qty: 20 TABLET | Refills: 0 | Status: SHIPPED | OUTPATIENT
Start: 2023-02-15 | End: 2023-03-06

## 2023-02-15 NOTE — ED PROVIDER NOTES
[facility] EMERGENCY DEPARTMENT  EMERGENCY DEPARTMENT HISTORY AND PHYSICAL EXAM      Date: 2/15/2023  Patient Name: Edwar Rascon  MRN: [id number]  [place]: 1980  Date of evaluation: 2/15/2023  Provider: Kaz Pichardo NP   Note Started: 5:55 PM 2/15/23    HISTORY OF PRESENT ILLNESS     Chief Complaint   Patient presents with    Headache    Sinus Pain       History Provided By: Patient    HPI: Edwar Rascon, 40 y.o. female DM,HTN headache, chills, sinus pain since yesterday. She reports use of TheraFlu mild improvement in symptoms however they represent. She denies any fever, sore throat, ear pain, cough, shortness of breath, lightheaded or dizziness. PAST MEDICAL HISTORY   Past Medical History:  Past Medical History:   Diagnosis Date    Carpal tunnel syndrome     Diabetes ([address]        Past Surgical History:  Past Surgical History:   Procedure Laterality Date    HX CARPAL TUNNEL RELEASE Right     HX  SECTION      HX TONSILLECTOMY         Family History:  Family History   Problem Relation Age of Onset    Diabetes Father        Social History:  Social History     Tobacco Use    Smoking status: Former    Smokeless tobacco: Never   Substance Use Topics    Alcohol use: No     Alcohol/week: 0.0 standard drinks    Drug use: No       Allergies:  No Known Allergies    PCP: Taran Lynn DO    Current Meds:   Previous Medications    GLUCOSE BLOOD VI TEST STRIPS (ONETOUCH VERIO TEST STRIPS) STRIP    Use to check BG once daily .  DX E11.65    INSULIN NEEDLES, DISPOSABLE, (JOSE ALBERTO PEN NEEDLE) 32 GAUGE X 5/32\" NDLE    With insulin    LOSARTAN (COZAAR) 100 MG TABLET    TAKE 1 TABLET BY MOUTH EVERY DAY -STOP COMBINATION TABLET    METFORMIN (GLUCOPHAGE) 1,000 MG TABLET    TAKE 1 TABLET BY MOUTH TWICE A DAY    ONETOUCH DELICA PLUS LANCET 33 GAUGE MISC    USE TO CHECK BLOOD SUGAR ONCE A DAY    SPIRONOLACTONE (ALDACTONE) 100 MG TABLET    TAKE 1 TABLET BY MOUTH EVERY DAY AT NIGHT    TRULICITY 1.5 NT/8.7 ML SUB-Q PEN    INJECT 0.5ML UNDER THE SKIN EVERY 7 DAYS, STOP 0.75MG AND TRADJENTA       REVIEW OF SYSTEMS   Review of Systems   Constitutional:  Negative for chills and fever. HENT:  Positive for congestion and sinus pain. Negative for ear discharge, ear pain, sinus pressure and sore throat. Respiratory:  Negative for cough and shortness of breath. Cardiovascular:  Negative for chest pain. Neurological:  Positive for headaches. Negative for dizziness and light-headedness. All other systems reviewed and are negative. Positives and Pertinent negatives as per HPI. PHYSICAL EXAM     ED Triage Vitals [02/15/23 1704]   ED Encounter Vitals Group      BP (!) 149/76      Pulse (Heart Rate) 77      Resp Rate 16      Temp 99.4 °F (37.4 °C)      Temp src       O2 Sat (%) 100 %      Weight 200 lb      Height 5' 4\"      Physical Exam  Vitals and nursing note reviewed. Constitutional:       General: She is not in acute distress. Appearance: Normal appearance. She is normal weight. She is not ill-appearing or toxic-appearing. HENT:      Head: Normocephalic and atraumatic. Right Ear: Hearing normal.      Left Ear: Hearing normal.      Nose:      Right Sinus: Maxillary sinus tenderness present. No frontal sinus tenderness. Left Sinus: Maxillary sinus tenderness present. No frontal sinus tenderness. Mouth/Throat:      Mouth: Mucous membranes are moist.      Tongue: No lesions. Pharynx: Uvula midline. No pharyngeal swelling or uvula swelling. Tonsils: No tonsillar exudate or tonsillar abscesses. 1+ on the right. 1+ on the left. Eyes:      General: Lids are normal.      Extraocular Movements: Extraocular movements intact. Pupils: Pupils are equal, round, and reactive to light. Cardiovascular:      Rate and Rhythm: Normal rate and regular rhythm. Pulses: Normal pulses. Radial pulses are 2+ on the right side and 2+ on the left side.         Dorsalis pedis pulses are 2+ on the right side and 2+ on the left side. Pulmonary:      Effort: Pulmonary effort is normal. No accessory muscle usage or respiratory distress. Breath sounds: Normal breath sounds. No wheezing or rhonchi. Abdominal:      General: Bowel sounds are normal.      Palpations: Abdomen is soft. Tenderness: There is no abdominal tenderness. There is no right CVA tenderness or left CVA tenderness. Musculoskeletal:      Cervical back: Normal range of motion and neck supple. No muscular tenderness. Right lower leg: No edema. Left lower leg: No edema. Feet:      Right foot:      Skin integrity: No skin breakdown. Left foot:      Skin integrity: No skin breakdown. Skin:     General: Skin is warm and dry. Capillary Refill: Capillary refill takes less than 2 seconds. Findings: No abrasion, bruising, ecchymosis, erythema or signs of injury. Neurological:      Mental Status: She is alert and oriented to person, place, and time. GCS: GCS eye subscore is 4. GCS verbal subscore is 5. GCS motor subscore is 6. Sensory: Sensation is intact. Psychiatric:         Attention and Perception: Attention normal.         Mood and Affect: Mood normal.         Behavior: Behavior normal. Behavior is cooperative. Cognition and Memory: Cognition normal.       SCREENINGS               No data recorded      LAB, EKG AND DIAGNOSTIC RESULTS    available at the time of this note:  No results found. PROCEDURES   Unless otherwise noted below, none. Performed by: Sandy Juan NP   Procedures      CRITICAL CARE TIME       ED COURSE and DIFFERENTIAL DIAGNOSIS/MDM   Vitals:    Vitals:    02/15/23 1704   BP: (!) 149/76   Pulse: 77   Resp: 16   Temp: 99.4 °F (37.4 °C)   SpO2: 100%   Weight: 90.7 kg (200 lb)   Height: 5' 4\" (1.626 m)         FINAL IMPRESSION     1. Viral illness          DISPOSITION/PLAN   Discharged    Discharge Note: The patient is stable for discharge home.  The signs, symptoms, diagnosis, and discharge instructions have been discussed, understanding conveyed, and agreed upon. The patient is to follow up as recommended or return to ER should their symptoms worsen. PATIENT REFERRED TO:  Follow-up Information       Follow up With Specialties Details Why [address] [facility], [facility],  Family Medicine Schedule an appointment as soon as possible for a visit in 1 week If symptoms worsen, As needed [address] [address]  [phone]                DISCHARGE MEDICATIONS:  Current Discharge Medication List        START taking these medications    Details   sodium chloride ([place] Saline) 0.65 % drop 2 Drops by Both Nostrils route every two (2) hours as needed for Congestion. Qty: 30 mL, Refills: 0  Start date: 2/15/2023      ibuprofen (MOTRIN) 600 mg tablet Take 1 Tablet by mouth every eight (8) hours as needed for Pain. Qty: 30 Tablet, Refills: 0  Start date: 2/15/2023      acetaminophen (Tylenol Extra Strength) 500 mg tablet Take 2 Tablets by mouth every six (6) hours as needed for Pain or Fever. Qty: 20 Tablet, Refills: 0  Start date: 2/15/2023           CONTINUE these medications which have CHANGED    Details   loratadine (CLARITIN) 10 mg tablet Take 1 Tablet by mouth daily as needed for Allergies or Rhinitis. Qty: 30 Tablet, Refills: 0  Start date: 2/15/2023               DISCONTINUED MEDICATIONS:  Current Discharge Medication List          I am the Primary Clinician of Record: Jorge Arteaga NP (electronically signed)    (Please note that parts of this dictation were completed with voice recognition software. Quite often unanticipated grammatical, syntax, homophones, and other interpretive errors are inadvertently transcribed by the computer software. Please disregards these errors.  Please excuse any errors that have escaped final proofreading.)

## 2023-02-15 NOTE — Clinical Note
[address]  [address] [place] [address]  [phone]    Work/School Note    Date: 2/15/2023    To Whom It May concern:      Peter Raman was seen and treated today in the emergency room by the following provider(s):  Nurse Practitioner: Riddhi Parson NP. Peter Raman is excused from work/school on 02/15/23. She is clear to return to work/school on 02/16/23.         Sincerely,          Ranjit Bennett NP

## 2023-02-27 ENCOUNTER — TELEPHONE (OUTPATIENT)
Dept: ENDOCRINOLOGY | Age: 43
End: 2023-02-27

## 2023-02-27 DIAGNOSIS — Z79.4 TYPE 2 DIABETES MELLITUS WITH HYPERGLYCEMIA, WITH LONG-TERM CURRENT USE OF INSULIN (HCC): ICD-10-CM

## 2023-02-27 DIAGNOSIS — E11.65 TYPE 2 DIABETES MELLITUS WITH HYPERGLYCEMIA, WITH LONG-TERM CURRENT USE OF INSULIN (HCC): ICD-10-CM

## 2023-02-27 RX ORDER — BLOOD SUGAR DIAGNOSTIC
STRIP MISCELLANEOUS
Qty: 50 STRIP | Refills: 11 | Status: SHIPPED | OUTPATIENT
Start: 2023-02-27

## 2023-03-06 ENCOUNTER — OFFICE VISIT (OUTPATIENT)
Dept: ENDOCRINOLOGY | Age: 43
End: 2023-03-06
Payer: COMMERCIAL

## 2023-03-06 VITALS
WEIGHT: 204 LBS | DIASTOLIC BLOOD PRESSURE: 79 MMHG | TEMPERATURE: 98.7 F | BODY MASS INDEX: 34.83 KG/M2 | HEIGHT: 64 IN | SYSTOLIC BLOOD PRESSURE: 123 MMHG | HEART RATE: 77 BPM | OXYGEN SATURATION: 99 %

## 2023-03-06 DIAGNOSIS — E11.65 UNCONTROLLED TYPE 2 DIABETES MELLITUS WITH HYPERGLYCEMIA, WITH LONG-TERM CURRENT USE OF INSULIN (HCC): ICD-10-CM

## 2023-03-06 DIAGNOSIS — E11.65 TYPE 2 DIABETES MELLITUS WITH HYPERGLYCEMIA, WITH LONG-TERM CURRENT USE OF INSULIN (HCC): Primary | ICD-10-CM

## 2023-03-06 DIAGNOSIS — L68.0 HIRSUTISM: ICD-10-CM

## 2023-03-06 DIAGNOSIS — I10 ESSENTIAL HYPERTENSION: ICD-10-CM

## 2023-03-06 DIAGNOSIS — Z79.4 UNCONTROLLED TYPE 2 DIABETES MELLITUS WITH HYPERGLYCEMIA, WITH LONG-TERM CURRENT USE OF INSULIN (HCC): ICD-10-CM

## 2023-03-06 DIAGNOSIS — Z53.20 STATIN DECLINED: ICD-10-CM

## 2023-03-06 DIAGNOSIS — Z79.4 TYPE 2 DIABETES MELLITUS WITH HYPERGLYCEMIA, WITH LONG-TERM CURRENT USE OF INSULIN (HCC): Primary | ICD-10-CM

## 2023-03-06 PROCEDURE — 3078F DIAST BP <80 MM HG: CPT | Performed by: INTERNAL MEDICINE

## 2023-03-06 PROCEDURE — 3074F SYST BP LT 130 MM HG: CPT | Performed by: INTERNAL MEDICINE

## 2023-03-06 PROCEDURE — 3044F HG A1C LEVEL LT 7.0%: CPT | Performed by: INTERNAL MEDICINE

## 2023-03-06 PROCEDURE — 99214 OFFICE O/P EST MOD 30 MIN: CPT | Performed by: INTERNAL MEDICINE

## 2023-03-06 RX ORDER — DULAGLUTIDE 1.5 MG/.5ML
INJECTION, SOLUTION SUBCUTANEOUS
Qty: 6 ML | Refills: 3 | Status: SHIPPED | OUTPATIENT
Start: 2023-03-06

## 2023-03-06 RX ORDER — METFORMIN HYDROCHLORIDE 1000 MG/1
1000 TABLET ORAL 2 TIMES DAILY
Qty: 180 TABLET | Refills: 3 | Status: SHIPPED | OUTPATIENT
Start: 2023-03-06

## 2023-03-06 RX ORDER — DICLOFENAC SODIUM 50 MG/1
TABLET, DELAYED RELEASE ORAL
COMMUNITY
Start: 2023-03-05

## 2023-03-06 RX ORDER — METHOCARBAMOL 500 MG/1
TABLET, FILM COATED ORAL
COMMUNITY
Start: 2023-03-05

## 2023-03-06 NOTE — PROGRESS NOTES
Identified pt with two pt identifiers. Reviewed record in preparation for visit and have obtained necessary documentation. All patient medications has been reviewed. Chief Complaint   Patient presents with    Diabetes    Hirsutism     Additional information about chief complaint:    Visit Vitals  /79 (BP 1 Location: Left upper arm, BP Patient Position: Sitting, BP Cuff Size: Adult long)   Pulse 77   Temp 98.7 °F (37.1 °C) (Temporal)   Ht 5' 4\" (1.626 m)   Wt 204 lb (92.5 kg)   SpO2 99%   BMI 35.02 kg/m²       Health Maintenance Due   Topic    Hepatitis C Screening     Pneumococcal 0-64 years (1 - PCV)    Hepatitis B Vaccine (1 of 3 - Risk 3-dose series)    DTaP/Tdap/Td series (1 - Tdap)    Cervical cancer screen     Lipid Screen     COVID-19 Vaccine (2 - Moderna series)    Depression Screen     Flu Vaccine (1)    Foot Exam Q1        1. Have you been to the ER, urgent care clinic since your last visit? Hospitalized since your last visit? [facility] on 3/5/23    2. Have you seen or consulted any other health care providers outside of the [address] [place] [address] since your last visit? Include any pap smears or colon screening.  no

## 2023-03-06 NOTE — LETTER
3/6/2023    Patient: Colonel Pena   YOB: 1980   Date of Visit: 3/6/2023     Oralia Berry [address] [address]  Via Fax: [phone]    Dear Aaron Garcias,      Thank you for referring Ms. Colonel Pena to [id number] [place] [address] for evaluation. My notes for this consultation are attached. If you have questions, please do not hesitate to call me. I look forward to following your patient along with you.       Sincerely,    Timothy Terrazas MD

## 2023-03-06 NOTE — PROGRESS NOTES
Tyrel Carrera MD      Patient Information  Date:3/6/2023  Name : Torri Gilman 40 y.o.     YOB: 1980           Chief Complaint   Patient presents with    Diabetes    Hirsutism       History of Present Illness: Torri Gilman is a 40 y.o. female here for fu of  Type 2 Diabetes Mellitus. 3/6/23  Metformin - abd pain, occasional diarrhea  Getting Trulicity - tolerating  Lifting weights  No vision issues  No chest pain   No issues with feet  Staying hydrated  Not planning any pregnancy - tubes tied    Prior history  Compliant with her medications  Checking once a week   On GLP-1 agonist, tolerating well  Hirsutism: On spironolactone which is helping  History of tubal ligation  Weight lifting and cardio  Maintaining weight ,goal weight is 180 lbs   No hypoglycemia    She was diagnosed with diabetes in February 2016. Wt Readings from Last 3 Encounters:   03/06/23 204 lb (92.5 kg)   02/15/23 200 lb (90.7 kg)   05/25/22 200 lb (90.7 kg)       BP Readings from Last 3 Encounters:   03/06/23 123/79   02/15/23 (!) 149/76   05/25/22 116/60           Past Medical History:   Diagnosis Date    Carpal tunnel syndrome     Diabetes (HCC)      Current Outpatient Medications   Medication Sig    methocarbamoL (ROBAXIN) 500 mg tablet     diclofenac EC (VOLTAREN) 50 mg EC tablet     dulaglutide (Trulicity) 1.5 ET/1.9 mL sub-q pen INJECT 0.5ML UNDER THE SKIN EVERY 7 DAYS, STOP 0.75MG AND TRADJENTA    metFORMIN (GLUCOPHAGE) 1,000 mg tablet Take 1 Tablet by mouth two (2) times a day. glucose blood VI test strips (OneTouch Verio test strips) strip Use to check BG once daily . DX E11.65    loratadine (CLARITIN) 10 mg tablet Take 1 Tablet by mouth daily as needed for Allergies or Rhinitis.     ibuprofen (MOTRIN) 600 mg tablet Take 1 Tablet by mouth every eight (8) hours as needed for Pain.    losartan (COZAAR) 100 mg tablet TAKE 1 TABLET BY MOUTH EVERY DAY -STOP COMBINATION TABLET    spironolactone (ALDACTONE) 100 mg tablet TAKE 1 TABLET BY MOUTH EVERY DAY AT NIGHT    OneTouch Delica Plus Lancet 33 gauge misc USE TO CHECK BLOOD SUGAR ONCE A DAY     No current facility-administered medications for this visit. No Known Allergies      Review of Systems:  Per HPI    Physical Examination:   Blood pressure 123/79, pulse 77, temperature 98.7 °F (37.1 °C), temperature source Temporal, height 5' 4\" (1.626 m), weight 204 lb (92.5 kg), last menstrual period 02/14/2023, SpO2 99 %, unknown if currently breastfeeding. Estimated body mass index is 35.02 kg/m² as calculated from the following:    Height as of this encounter: 5' 4\" (1.626 m). Weight as of this encounter: 204 lb (92.5 kg).   General: pleasant, no distress, good eye contact  HEENT: no exophthalmos, no periorbital edema, EOMI  CVS -S1-S2 regular  RS: Normal respiratory effort  Musculoskeletal: no tremors  Neurological: alert and oriented  Psychiatric: normal mood and affect  Skin: Normal color    Diabetic foot exam: Sept 2021    Left:     Vibratory sensation normal    Filament test normal sensation with micro filament   Pulse DP: 1+    Deformities: None  Right:    Vibratory sensation normal   Filament test normal sensation with micro filament   Pulse DP: 1+   Deformities: None      Data Reviewed:     Lab Results   Component Value Date/Time    Hemoglobin A1c 5.7 (H) 02/27/2023 01:50 PM    Hemoglobin A1c 5.3 01/19/2022 10:58 AM    Hemoglobin A1c 6.2 (H) 05/11/2021 11:06 AM    Hemoglobin A1c, External 12.6 02/03/2016 12:00 AM    Glucose 79 02/27/2023 01:50 PM    Glucose  12/05/2019 09:56 AM    Microalbumin/Creat ratio (mg/g creat) <16 02/27/2023 01:50 PM    Microalbumin,urine random <0.50 02/27/2023 01:50 PM    LDL,Direct 38 02/27/2023 01:50 PM    LDL, calculated 60 10/10/2018 09:30 AM    Creatinine 0.79 02/27/2023 01:50 PM      Lab Results   Component Value Date/Time    GFR est non-AA 85 01/19/2022 10:58 AM    GFR est AA 98 01/19/2022 10:58 AM    Creatinine 0.79 02/27/2023 01:50 PM    BUN 17 02/27/2023 01:50 PM    Sodium 134 (L) 02/27/2023 01:50 PM    Potassium 4.8 02/27/2023 01:50 PM    Chloride 102 02/27/2023 01:50 PM    CO2 27 02/27/2023 01:50 PM       No results found for: WBC, WBCLT, HGBPOC, HGB, HGBP, HCTPOC, HCT, PHCT, RBCH, PLT, MCV, HGBEXT, HCTEXT, PLTEXT, HGBEXT, HCTEXT, PLTEXT         Assessment/Plan:     1. Type 2 diabetes mellitus with hyperglycemia, with long-term current use of insulin ([address]    2. Hirsutism    3. Essential hypertension    4. Uncontrolled type 2 diabetes mellitus with hyperglycemia, with long-term current use of insulin ([address]          1. Type 2 Diabetes Mellitus   Lab Results   Component Value Date/Time    Hemoglobin A1c 5.7 (H) 02/27/2023 01:50 PM    Hemoglobin A1c (POC) 5.3 05/25/2022 01:30 PM    Hemoglobin A1c, External 12.6 02/03/2016 12:00 AM   Controlled  Metformin 500 mg BID, reduced it March 8822,  Trulicity - nausea with higher dose    FLU annually ,Pneumovax ,aspirin daily,annual eye exam,microalbumin    2. PCOS/hirsutism - had oligomenorrhea , metformin has helped _PCOS  Spironolactone, tubal ligation  Avoid pregnancy, risk factors of teratogenicity explained, she is not planning any pregnancy    3 HTN -losartan, off labetalol  Spironolactone      4. Obesity:Body mass index is 35.02 kg/m². Discussed about the importance of exercise and carbohydrate portion control. Has tried Atkins diet ,ketodiet , Herbal life   At one point she wanted to pursue bariatric surgery    5. Dyslipidemia - declined statins , discussed benefits   Follow-up and Dispositions    Return for follow up in 5-6 months with labs bnv. Thank you for allowing me to participate in the care of this patient.     Braden Naranjo MD      Patient verbalized understanding

## 2023-03-31 DIAGNOSIS — E11.65 TYPE 2 DIABETES MELLITUS WITH HYPERGLYCEMIA, WITH LONG-TERM CURRENT USE OF INSULIN (HCC): ICD-10-CM

## 2023-03-31 DIAGNOSIS — Z79.4 TYPE 2 DIABETES MELLITUS WITH HYPERGLYCEMIA, WITH LONG-TERM CURRENT USE OF INSULIN (HCC): ICD-10-CM

## 2023-03-31 RX ORDER — SPIRONOLACTONE 100 MG/1
100 TABLET, FILM COATED ORAL
Qty: 90 TABLET | Refills: 3 | Status: SHIPPED | OUTPATIENT
Start: 2023-03-31

## 2023-03-31 NOTE — TELEPHONE ENCOUNTER
Pt left VM stating she needs spironolactone sent to [facility]. Only has one pill left.

## 2023-04-05 RX ORDER — LOSARTAN POTASSIUM 100 MG/1
TABLET ORAL
Qty: 90 TABLET | Refills: 3 | Status: SHIPPED
Start: 2023-04-05

## 2023-04-05 NOTE — TELEPHONE ENCOUNTER
Pt Cande Ortiz left a voicemail stating that she needed her losartan sent to [facility], which is a change from her previous preferred pharmacy. Rx for losartan pend to Dr. June Zacarias for approval. Pt's preferred pharmacy changed in chart.

## 2023-04-22 DIAGNOSIS — E11.65 TYPE 2 DIABETES MELLITUS WITH HYPERGLYCEMIA, WITH LONG-TERM CURRENT USE OF INSULIN (HCC): Primary | ICD-10-CM

## 2023-04-22 DIAGNOSIS — L68.0 HIRSUTISM: ICD-10-CM

## 2023-04-22 DIAGNOSIS — Z79.4 TYPE 2 DIABETES MELLITUS WITH HYPERGLYCEMIA, WITH LONG-TERM CURRENT USE OF INSULIN (HCC): Primary | ICD-10-CM

## 2023-04-24 DIAGNOSIS — E11.65 TYPE 2 DIABETES MELLITUS WITH HYPERGLYCEMIA, WITH LONG-TERM CURRENT USE OF INSULIN (HCC): Primary | ICD-10-CM

## 2023-04-24 DIAGNOSIS — Z79.4 TYPE 2 DIABETES MELLITUS WITH HYPERGLYCEMIA, WITH LONG-TERM CURRENT USE OF INSULIN (HCC): Primary | ICD-10-CM

## 2023-04-24 DIAGNOSIS — L68.0 HIRSUTISM: ICD-10-CM

## 2023-05-25 RX ORDER — DULAGLUTIDE 1.5 MG/.5ML
INJECTION, SOLUTION SUBCUTANEOUS
COMMUNITY
Start: 2023-03-06

## 2023-05-25 RX ORDER — METHOCARBAMOL 500 MG/1
TABLET, FILM COATED ORAL
COMMUNITY
Start: 2023-03-05

## 2023-05-25 RX ORDER — IBUPROFEN 600 MG/1
600 TABLET ORAL EVERY 8 HOURS PRN
COMMUNITY
Start: 2023-02-15

## 2023-05-25 RX ORDER — LOSARTAN POTASSIUM 100 MG/1
TABLET ORAL
COMMUNITY
Start: 2023-04-05

## 2023-05-25 RX ORDER — LORATADINE 10 MG/1
10 TABLET ORAL DAILY PRN
COMMUNITY
Start: 2023-02-15

## 2023-05-25 RX ORDER — SPIRONOLACTONE 100 MG/1
1 TABLET, FILM COATED ORAL NIGHTLY
COMMUNITY
Start: 2023-03-31

## 2023-05-31 ENCOUNTER — TELEPHONE (OUTPATIENT)
Age: 43
End: 2023-05-31

## 2023-05-31 NOTE — TELEPHONE ENCOUNTER
Informed pt of Dr. James Hernandez note. Pt verbalized understanding with no further questions or concerns at this time.

## 2023-05-31 NOTE — TELEPHONE ENCOUNTER
Blood glucose is normal.  I want her to talk to the eye doctor and see if it is okay to wait till 13th, recommend seeing the eye doctor soon

## 2023-05-31 NOTE — TELEPHONE ENCOUNTER
Pt left  stating she is having issues with her eyes. Has eye appt coming up.  Stated her A1C in Jan was 5.7%

## 2023-05-31 NOTE — TELEPHONE ENCOUNTER
Pt states her eyesight is going in and out of blurriness, especially left eye. Feels like there is pressure. BG have been running <120. No new meds. On Trulicity 1.5 weekly and Metformin 1000 mg 1/2 tab BID.  Has appt with eye doc on 6/13/23

## 2023-08-21 ENCOUNTER — NURSE ONLY (OUTPATIENT)
Age: 43
End: 2023-08-21

## 2023-08-21 DIAGNOSIS — L68.0 HIRSUTISM: ICD-10-CM

## 2023-08-21 DIAGNOSIS — E11.65 TYPE 2 DIABETES MELLITUS WITH HYPERGLYCEMIA, WITH LONG-TERM CURRENT USE OF INSULIN (HCC): ICD-10-CM

## 2023-08-21 DIAGNOSIS — Z79.4 TYPE 2 DIABETES MELLITUS WITH HYPERGLYCEMIA, WITH LONG-TERM CURRENT USE OF INSULIN (HCC): ICD-10-CM

## 2023-08-22 LAB
ANION GAP SERPL CALC-SCNC: 6 MMOL/L (ref 5–15)
BUN SERPL-MCNC: 12 MG/DL (ref 6–20)
BUN/CREAT SERPL: 15 (ref 12–20)
CALCIUM SERPL-MCNC: 9.1 MG/DL (ref 8.5–10.1)
CHLORIDE SERPL-SCNC: 107 MMOL/L (ref 97–108)
CO2 SERPL-SCNC: 25 MMOL/L (ref 21–32)
CREAT SERPL-MCNC: 0.78 MG/DL (ref 0.55–1.02)
EST. AVERAGE GLUCOSE BLD GHB EST-MCNC: 117 MG/DL
GLUCOSE SERPL-MCNC: 90 MG/DL (ref 65–100)
HBA1C MFR BLD: 5.7 % (ref 4–5.6)
POTASSIUM SERPL-SCNC: 4.6 MMOL/L (ref 3.5–5.1)
SODIUM SERPL-SCNC: 138 MMOL/L (ref 136–145)

## 2023-08-28 ENCOUNTER — OFFICE VISIT (OUTPATIENT)
Age: 43
End: 2023-08-28
Payer: COMMERCIAL

## 2023-08-28 VITALS
WEIGHT: 193 LBS | RESPIRATION RATE: 18 BRPM | HEIGHT: 64 IN | DIASTOLIC BLOOD PRESSURE: 63 MMHG | OXYGEN SATURATION: 100 % | TEMPERATURE: 98.5 F | SYSTOLIC BLOOD PRESSURE: 124 MMHG | HEART RATE: 62 BPM | BODY MASS INDEX: 32.95 KG/M2

## 2023-08-28 DIAGNOSIS — E11.65 TYPE 2 DIABETES MELLITUS WITH HYPERGLYCEMIA, WITHOUT LONG-TERM CURRENT USE OF INSULIN (HCC): Primary | ICD-10-CM

## 2023-08-28 DIAGNOSIS — I10 ESSENTIAL HYPERTENSION: ICD-10-CM

## 2023-08-28 PROCEDURE — 3044F HG A1C LEVEL LT 7.0%: CPT | Performed by: INTERNAL MEDICINE

## 2023-08-28 PROCEDURE — 3074F SYST BP LT 130 MM HG: CPT | Performed by: INTERNAL MEDICINE

## 2023-08-28 PROCEDURE — 3078F DIAST BP <80 MM HG: CPT | Performed by: INTERNAL MEDICINE

## 2023-08-28 PROCEDURE — 99214 OFFICE O/P EST MOD 30 MIN: CPT | Performed by: INTERNAL MEDICINE

## 2023-08-28 RX ORDER — LANCETS 33 GAUGE
EACH MISCELLANEOUS
COMMUNITY
Start: 2021-12-05

## 2023-08-28 RX ORDER — BLOOD SUGAR DIAGNOSTIC
STRIP MISCELLANEOUS
COMMUNITY
Start: 2023-07-15

## 2023-08-28 NOTE — PROGRESS NOTES
Fercho Rocha is a 44 y.o. female here for   Chief Complaint   Patient presents with    Diabetes       1. Have you been to the ER, urgent care clinic since your last visit? Hospitalized since your last visit? -no    2. Have you seen or consulted any other health care providers outside of the [address] [place] [address] Avenue since your last visit? Include any pap smears or colon screening. -GYN

## 2023-10-23 ENCOUNTER — APPOINTMENT (OUTPATIENT)
Facility: HOSPITAL | Age: 43
End: 2023-10-23
Payer: COMMERCIAL

## 2023-10-23 ENCOUNTER — HOSPITAL ENCOUNTER (EMERGENCY)
Facility: HOSPITAL | Age: 43
Discharge: HOME | End: 2023-10-23
Attending: FAMILY MEDICINE
Payer: COMMERCIAL

## 2023-10-23 VITALS
SYSTOLIC BLOOD PRESSURE: 112 MMHG | BODY MASS INDEX: 31.07 KG/M2 | DIASTOLIC BLOOD PRESSURE: 64 MMHG | OXYGEN SATURATION: 100 % | RESPIRATION RATE: 18 BRPM | TEMPERATURE: 97.6 F | HEART RATE: 54 BPM | HEIGHT: 64 IN | WEIGHT: 182 LBS

## 2023-10-23 DIAGNOSIS — R51.9 NONINTRACTABLE EPISODIC HEADACHE, UNSPECIFIED HEADACHE TYPE: Primary | ICD-10-CM

## 2023-10-23 DIAGNOSIS — R94.31 ABNORMAL EKG: ICD-10-CM

## 2023-10-23 LAB
ALBUMIN SERPL-MCNC: 4.1 G/DL (ref 3.5–5)
ALBUMIN/GLOB SERPL: 1.3 (ref 1.1–2.2)
ALP SERPL-CCNC: 41 U/L (ref 45–117)
ALT SERPL-CCNC: 19 U/L (ref 12–78)
ANION GAP SERPL CALC-SCNC: 9 MMOL/L (ref 5–15)
APPEARANCE UR: CLEAR
AST SERPL W P-5'-P-CCNC: 36 U/L (ref 15–37)
BACTERIA URNS QL MICRO: ABNORMAL /HPF
BASOPHILS # BLD: 0 K/UL (ref 0–0.1)
BASOPHILS NFR BLD: 1 % (ref 0–1)
BILIRUB SERPL-MCNC: 0.3 MG/DL (ref 0.2–1)
BILIRUB UR QL: NEGATIVE
BUN SERPL-MCNC: 12 MG/DL (ref 6–20)
BUN/CREAT SERPL: 13 (ref 12–20)
CA-I BLD-MCNC: 9.4 MG/DL (ref 8.5–10.1)
CHLORIDE SERPL-SCNC: 101 MMOL/L (ref 97–108)
CO2 SERPL-SCNC: 27 MMOL/L (ref 21–32)
COLOR UR: COLORLESS
CREAT SERPL-MCNC: 0.91 MG/DL (ref 0.55–1.02)
DIFFERENTIAL METHOD BLD: ABNORMAL
EKG ATRIAL RATE: 61 BPM
EKG DIAGNOSIS: NORMAL
EKG P AXIS: 32 DEGREES
EKG P-R INTERVAL: 170 MS
EKG Q-T INTERVAL: 412 MS
EKG QRS DURATION: 86 MS
EKG QTC CALCULATION (BAZETT): 414 MS
EKG R AXIS: 75 DEGREES
EKG T AXIS: -12 DEGREES
EKG VENTRICULAR RATE: 61 BPM
EOSINOPHIL # BLD: 0 K/UL (ref 0–0.4)
EOSINOPHIL NFR BLD: 1 % (ref 0–7)
EPITH CASTS URNS QL MICRO: ABNORMAL /LPF
ERYTHROCYTE [DISTWIDTH] IN BLOOD BY AUTOMATED COUNT: 13.1 % (ref 11.5–14.5)
GLOBULIN SER CALC-MCNC: 3.2 G/DL (ref 2–4)
GLUCOSE SERPL-MCNC: 90 MG/DL (ref 65–100)
GLUCOSE UR STRIP.AUTO-MCNC: NEGATIVE MG/DL
HCT VFR BLD AUTO: 33.4 % (ref 35–47)
HGB BLD-MCNC: 11 G/DL (ref 11.5–16)
HGB UR QL STRIP: NEGATIVE
IMM GRANULOCYTES # BLD AUTO: 0 K/UL (ref 0–0.04)
IMM GRANULOCYTES NFR BLD AUTO: 0 % (ref 0–0.5)
KETONES UR QL STRIP.AUTO: NEGATIVE MG/DL
LEUKOCYTE ESTERASE UR QL STRIP.AUTO: NEGATIVE
LYMPHOCYTES # BLD: 1.9 K/UL (ref 0.8–3.5)
LYMPHOCYTES NFR BLD: 40 % (ref 12–49)
MCH RBC QN AUTO: 30.1 PG (ref 26–34)
MCHC RBC AUTO-ENTMCNC: 32.9 G/DL (ref 30–36.5)
MCV RBC AUTO: 91.5 FL (ref 80–99)
MONOCYTES # BLD: 0.4 K/UL (ref 0–1)
MONOCYTES NFR BLD: 8 % (ref 5–13)
MUCOUS THREADS URNS QL MICRO: ABNORMAL /LPF
NEUTS SEG # BLD: 2.4 K/UL (ref 1.8–8)
NEUTS SEG NFR BLD: 50 % (ref 32–75)
NITRITE UR QL STRIP.AUTO: NEGATIVE
PH UR STRIP: 7 (ref 5–8)
PLATELET # BLD AUTO: 252 K/UL (ref 150–400)
PMV BLD AUTO: 11.2 FL (ref 8.9–12.9)
POTASSIUM SERPL-SCNC: 4 MMOL/L (ref 3.5–5.1)
PROT SERPL-MCNC: 7.3 G/DL (ref 6.4–8.2)
PROT UR STRIP-MCNC: NEGATIVE MG/DL
RBC # BLD AUTO: 3.65 M/UL (ref 3.8–5.2)
RBC #/AREA URNS HPF: ABNORMAL /HPF (ref 0–5)
SODIUM SERPL-SCNC: 137 MMOL/L (ref 136–145)
SP GR UR REFRACTOMETRY: 1 (ref 1–1.03)
TROPONIN I SERPL HS-MCNC: 11 NG/L (ref 0–51)
URINE CULTURE IF INDICATED: ABNORMAL
UROBILINOGEN UR QL STRIP.AUTO: 0.1 EU/DL (ref 0.2–1)
WBC # BLD AUTO: 4.7 K/UL (ref 3.6–11)
WBC URNS QL MICRO: ABNORMAL /HPF (ref 0–4)

## 2023-10-23 PROCEDURE — 93005 ELECTROCARDIOGRAM TRACING: CPT | Performed by: FAMILY MEDICINE

## 2023-10-23 PROCEDURE — 70450 CT HEAD/BRAIN W/O DYE: CPT

## 2023-10-23 PROCEDURE — 81001 URINALYSIS AUTO W/SCOPE: CPT

## 2023-10-23 PROCEDURE — 99284 EMERGENCY DEPT VISIT MOD MDM: CPT

## 2023-10-23 PROCEDURE — 6360000002 HC RX W HCPCS: Performed by: FAMILY MEDICINE

## 2023-10-23 PROCEDURE — 6370000000 HC RX 637 (ALT 250 FOR IP): Performed by: FAMILY MEDICINE

## 2023-10-23 PROCEDURE — 84484 ASSAY OF TROPONIN QUANT: CPT

## 2023-10-23 PROCEDURE — 80053 COMPREHEN METABOLIC PANEL: CPT

## 2023-10-23 PROCEDURE — 85025 COMPLETE CBC W/AUTO DIFF WBC: CPT

## 2023-10-23 PROCEDURE — 96374 THER/PROPH/DIAG INJ IV PUSH: CPT

## 2023-10-23 RX ORDER — SENNOSIDES 8.6 MG
650 CAPSULE ORAL EVERY 8 HOURS PRN
Qty: 12 TABLET | Refills: 0 | Status: SHIPPED | OUTPATIENT
Start: 2023-10-23 | End: 2023-10-27

## 2023-10-23 RX ORDER — IBUPROFEN 800 MG/1
800 TABLET ORAL EVERY 8 HOURS PRN
Qty: 12 TABLET | Refills: 0 | Status: SHIPPED | OUTPATIENT
Start: 2023-10-23 | End: 2023-10-28

## 2023-10-23 RX ORDER — ACETAMINOPHEN 500 MG
1000 TABLET ORAL
Status: COMPLETED | OUTPATIENT
Start: 2023-10-23 | End: 2023-10-23

## 2023-10-23 RX ORDER — KETOROLAC TROMETHAMINE 30 MG/ML
30 INJECTION, SOLUTION INTRAMUSCULAR; INTRAVENOUS ONCE
Status: COMPLETED | OUTPATIENT
Start: 2023-10-23 | End: 2023-10-23

## 2023-10-23 RX ADMIN — ACETAMINOPHEN 1000 MG: 500 TABLET ORAL at 15:41

## 2023-10-23 RX ADMIN — KETOROLAC TROMETHAMINE 30 MG: 30 INJECTION INTRAMUSCULAR; INTRAVENOUS at 15:42

## 2023-10-23 ASSESSMENT — PAIN SCALES - GENERAL: PAINLEVEL_OUTOF10: 4

## 2023-10-23 ASSESSMENT — PAIN - FUNCTIONAL ASSESSMENT: PAIN_FUNCTIONAL_ASSESSMENT: 0-10

## 2023-10-23 NOTE — ED PROVIDER NOTES
EMERGENCY DEPARTMENT HISTORY AND PHYSICAL EXAM       Date: 10/23/2023  Patient Name: Giuseppe Whitehead    History of Presenting Illness         History Provided By:     HPI: Giuseppe Whitehead, is an extremely pleasant 44 y.o. female presenting to the ED with a chief complaint of headache. Patient endorses gradual onset of headache that started 2 days ago. Patient characterizes pain as achy over front of head. Denies abrupt nor worst headache of life. No identifiable alleviating or aggravating factors. No neck stiffness nor fevers. Occasionally she feels like she sees \"a black line, \" which makes her feel dizzy intermittently. Denies any field cuts however. No diplopia. No temporal pain. No numbness, tingling nor weakness in extremities. Patient declines any recent head nor neck injuries. There are no other complaints, changes, or physical findings at this time. PCP: Kory Bush, DO    No current facility-administered medications on file prior to encounter.      Current Outpatient Medications on File Prior to Encounter   Medication Sig Dispense Refill    ONETOUCH VERIO strip       Lancets (ONETOUCH DELICA PLUS [id number]) MISC USE TO CHECK BLOOD SUGAR ONCE A DAY      Ferrous Sulfate (IRON PO) Take 1 tablet by mouth daily      Multiple Vitamin (MULTIVITAMIN ADULT PO) Take 1 tablet by mouth daily      loratadine (CLARITIN) 10 MG tablet Take 1 tablet by mouth daily as needed      losartan (COZAAR) 100 MG tablet TAKE 1 TABLET BY MOUTH EVERY DAY -STOP COMBINATION TABLET      metFORMIN (GLUCOPHAGE) 1000 MG tablet Take 0.5 tablets by mouth 2 times daily      spironolactone (ALDACTONE) 100 MG tablet Take 1 tablet by mouth nightly         Past History     Past Medical History:  Past Medical History:   Diagnosis Date    Carpal tunnel syndrome     Diabetes ([address])        Past Surgical History:  Past Surgical History:   Procedure Laterality Date    CARPAL TUNNEL RELEASE Right      SECTION      TONSILLECTOMY

## 2023-10-23 NOTE — DISCHARGE INSTRUCTIONS
Total Protein 7.3 6.4 - 8.2 g/dL    Albumin 4.1 3.5 - 5.0 g/dL    Globulin 3.2 2.0 - 4.0 g/dL    Albumin/Globulin Ratio 1.3 1.1 - 2.2     Troponin    Collection Time: 10/23/23  2:03 PM   Result Value Ref Range    Troponin, High Sensitivity 11 0 - 51 ng/L   Urinalysis with Reflex to Culture    Collection Time: 10/23/23  2:22 PM    Specimen: Urine   Result Value Ref Range    Color, UA Colorless      Appearance Clear Clear      Specific Gravity, UA 1.005 1.003 - 1.030      pH, Urine 7.0 5.0 - 8.0      Protein, UA Negative Negative mg/dL    Glucose, UA Negative Negative mg/dL    Ketones, Urine Negative Negative mg/dL    Bilirubin Urine Negative Negative      Blood, Urine Negative Negative      Urobilinogen, Urine 0.1 (L) 0.2 - 1.0 EU/dL    Nitrite, Urine Negative Negative      Leukocyte Esterase, Urine Negative Negative      WBC, UA 5-10 0 - 4 /hpf    RBC, UA 0-5 0 - 5 /hpf    Epithelial Cells UA Moderate (A) Few /lpf    BACTERIA, URINE 3+ (A) Negative /hpf    Urine Culture if Indicated Culture not indicated by UA result Culture not indicated by UA result      Mucus, UA 1+ (A) Negative /lpf       Radiologic Studies  CT Head W/O Contrast   Final Result   No acute intracranial abnormalities. ------------------------------------------------------------------------------------------------------------  The exam and treatment you received in the Emergency Department were for an urgent problem and are not intended as complete care. It is important that you follow-up with a doctor, nurse practitioner, or physician assistant to:  (1) confirm your diagnosis,  (2) re-evaluation of changes in your illness and treatment, and  (3) for ongoing care. Please take your discharge instructions with you when you go to your follow-up appointment. If you have any problem arranging a follow-up appointment, contact the Emergency Department.   If your symptoms become worse or you do not improve as expected and you are unable to

## 2023-12-29 ENCOUNTER — NURSE ONLY (OUTPATIENT)
Age: 43
End: 2023-12-29

## 2023-12-29 DIAGNOSIS — I10 ESSENTIAL HYPERTENSION: ICD-10-CM

## 2023-12-29 DIAGNOSIS — E11.65 TYPE 2 DIABETES MELLITUS WITH HYPERGLYCEMIA, WITHOUT LONG-TERM CURRENT USE OF INSULIN (HCC): ICD-10-CM

## 2023-12-30 LAB
ANION GAP SERPL CALC-SCNC: 4 MMOL/L (ref 5–15)
BUN SERPL-MCNC: 22 MG/DL (ref 6–20)
BUN/CREAT SERPL: 22 (ref 12–20)
CALCIUM SERPL-MCNC: 9 MG/DL (ref 8.5–10.1)
CHLORIDE SERPL-SCNC: 106 MMOL/L (ref 97–108)
CHOLEST SERPL-MCNC: 124 MG/DL
CO2 SERPL-SCNC: 27 MMOL/L (ref 21–32)
CREAT SERPL-MCNC: 0.98 MG/DL (ref 0.55–1.02)
CREAT UR-MCNC: 185 MG/DL
EST. AVERAGE GLUCOSE BLD GHB EST-MCNC: 120 MG/DL
GLUCOSE SERPL-MCNC: 139 MG/DL (ref 65–100)
HBA1C MFR BLD: 5.8 % (ref 4–5.6)
HDLC SERPL-MCNC: 72 MG/DL
HDLC SERPL: 1.7 (ref 0–5)
LDLC SERPL CALC-MCNC: 38.8 MG/DL (ref 0–100)
MICROALBUMIN UR-MCNC: 2.33 MG/DL
MICROALBUMIN/CREAT UR-RTO: 13 MG/G (ref 0–30)
POTASSIUM SERPL-SCNC: 4.5 MMOL/L (ref 3.5–5.1)
SODIUM SERPL-SCNC: 137 MMOL/L (ref 136–145)
TRIGL SERPL-MCNC: 66 MG/DL
VLDLC SERPL CALC-MCNC: 13.2 MG/DL

## 2024-01-05 ENCOUNTER — OFFICE VISIT (OUTPATIENT)
Age: 44
End: 2024-01-05
Payer: COMMERCIAL

## 2024-01-05 VITALS
RESPIRATION RATE: 18 BRPM | OXYGEN SATURATION: 100 % | HEIGHT: 64 IN | WEIGHT: 176 LBS | DIASTOLIC BLOOD PRESSURE: 68 MMHG | SYSTOLIC BLOOD PRESSURE: 119 MMHG | BODY MASS INDEX: 30.05 KG/M2 | HEART RATE: 63 BPM

## 2024-01-05 DIAGNOSIS — I10 ESSENTIAL HYPERTENSION: ICD-10-CM

## 2024-01-05 DIAGNOSIS — E78.2 MIXED HYPERLIPIDEMIA: ICD-10-CM

## 2024-01-05 DIAGNOSIS — E11.65 TYPE 2 DIABETES MELLITUS WITH HYPERGLYCEMIA, WITHOUT LONG-TERM CURRENT USE OF INSULIN (HCC): Primary | ICD-10-CM

## 2024-01-05 PROCEDURE — 3074F SYST BP LT 130 MM HG: CPT | Performed by: INTERNAL MEDICINE

## 2024-01-05 PROCEDURE — 3078F DIAST BP <80 MM HG: CPT | Performed by: INTERNAL MEDICINE

## 2024-01-05 PROCEDURE — 99214 OFFICE O/P EST MOD 30 MIN: CPT | Performed by: INTERNAL MEDICINE

## 2024-01-05 NOTE — PROGRESS NOTES
Blood pressure 119/68, pulse 63, resp. rate 18, height 1.626 m (5' 4\"), weight 79.8 kg (176 lb), SpO2 100 %.   
9.0 12/29/2023    MALBCR 13 12/29/2023              Assessment/Plan:                 1. Type 2 Diabetes Mellitus      Lab Results   Component Value Date    LABA1C 5.8 (H) 12/29/2023     Controlled  Off Trulicity due to nausea   Metformin 500 mg daily    FLU annually ,Pneumovax ,aspirin daily,annual eye exam,microalbumin      2.  PCOS/hirsutism - had  oligomenorrhea , metformin has helped _PCOS   Spironolactone, tubal ligation   Avoid pregnancy, risk factors of teratogenicity explained, she is not planning any pregnancy      3 HTN -losartan 1/2 tablet , off labetalol   Spironolactone half a tablet         4.Obesity:Body mass index is 35.02 kg/m².   Discussed about the importance of exercise and carbohydrate portion control.   Has tried Atkins diet ,ketodiet , Herbal life    At one point she wanted to pursue bariatric surgery      5.Dyslipidemia - declined statins , discussed benefits ,               Thank you for allowing me to participate in the care of this patient.      Mirta Martin MD         Patient verbalized understanding

## 2024-01-16 RX ORDER — LOSARTAN POTASSIUM 100 MG/1
TABLET ORAL
Qty: 30 TABLET | Refills: 0 | Status: SHIPPED | OUTPATIENT
Start: 2024-01-16

## 2024-01-16 RX ORDER — SPIRONOLACTONE 100 MG/1
100 TABLET, FILM COATED ORAL NIGHTLY
Qty: 30 TABLET | Refills: 0 | Status: SHIPPED | OUTPATIENT
Start: 2024-01-16

## 2024-03-25 DIAGNOSIS — E11.65 TYPE 2 DIABETES MELLITUS WITH HYPERGLYCEMIA, WITHOUT LONG-TERM CURRENT USE OF INSULIN (HCC): Primary | ICD-10-CM

## 2024-03-26 RX ORDER — BLOOD SUGAR DIAGNOSTIC
STRIP MISCELLANEOUS
Qty: 50 EACH | Refills: 11 | Status: SHIPPED | OUTPATIENT
Start: 2024-03-26

## 2024-05-29 ENCOUNTER — NURSE ONLY (OUTPATIENT)
Age: 44
End: 2024-05-29

## 2024-05-29 DIAGNOSIS — E11.65 TYPE 2 DIABETES MELLITUS WITH HYPERGLYCEMIA, WITHOUT LONG-TERM CURRENT USE OF INSULIN (HCC): ICD-10-CM

## 2024-05-30 LAB
ANION GAP SERPL CALC-SCNC: 4 MMOL/L (ref 5–15)
BUN SERPL-MCNC: 10 MG/DL (ref 6–20)
BUN/CREAT SERPL: 12 (ref 12–20)
CALCIUM SERPL-MCNC: 9.3 MG/DL (ref 8.5–10.1)
CHLORIDE SERPL-SCNC: 108 MMOL/L (ref 97–108)
CO2 SERPL-SCNC: 28 MMOL/L (ref 21–32)
CREAT SERPL-MCNC: 0.81 MG/DL (ref 0.55–1.02)
EST. AVERAGE GLUCOSE BLD GHB EST-MCNC: 111 MG/DL
GLUCOSE SERPL-MCNC: 72 MG/DL (ref 65–100)
HBA1C MFR BLD: 5.5 % (ref 4–5.6)
LDLC SERPL DIRECT ASSAY-MCNC: 36 MG/DL (ref 0–100)
POTASSIUM SERPL-SCNC: 4 MMOL/L (ref 3.5–5.1)
SODIUM SERPL-SCNC: 140 MMOL/L (ref 136–145)

## 2024-06-21 ENCOUNTER — OFFICE VISIT (OUTPATIENT)
Age: 44
End: 2024-06-21
Payer: COMMERCIAL

## 2024-06-21 VITALS
WEIGHT: 174 LBS | TEMPERATURE: 99.9 F | BODY MASS INDEX: 29.71 KG/M2 | SYSTOLIC BLOOD PRESSURE: 145 MMHG | HEART RATE: 72 BPM | DIASTOLIC BLOOD PRESSURE: 74 MMHG | RESPIRATION RATE: 20 BRPM | HEIGHT: 64 IN

## 2024-06-21 DIAGNOSIS — E55.9 VITAMIN D DEFICIENCY: ICD-10-CM

## 2024-06-21 DIAGNOSIS — E11.65 TYPE 2 DIABETES MELLITUS WITH HYPERGLYCEMIA, UNSPECIFIED WHETHER LONG TERM INSULIN USE (HCC): Primary | ICD-10-CM

## 2024-06-21 DIAGNOSIS — I10 ESSENTIAL HYPERTENSION: ICD-10-CM

## 2024-06-21 PROCEDURE — 3044F HG A1C LEVEL LT 7.0%: CPT | Performed by: INTERNAL MEDICINE

## 2024-06-21 PROCEDURE — 3077F SYST BP >= 140 MM HG: CPT | Performed by: INTERNAL MEDICINE

## 2024-06-21 PROCEDURE — 99214 OFFICE O/P EST MOD 30 MIN: CPT | Performed by: INTERNAL MEDICINE

## 2024-06-21 PROCEDURE — 3078F DIAST BP <80 MM HG: CPT | Performed by: INTERNAL MEDICINE

## 2024-06-21 RX ORDER — METOPROLOL SUCCINATE 25 MG/1
TABLET, EXTENDED RELEASE ORAL
COMMUNITY
Start: 2024-06-17

## 2024-06-21 RX ORDER — LOSARTAN POTASSIUM 25 MG/1
25 TABLET ORAL DAILY
COMMUNITY
Start: 2024-06-17

## 2024-06-21 ASSESSMENT — PATIENT HEALTH QUESTIONNAIRE - PHQ9
1. LITTLE INTEREST OR PLEASURE IN DOING THINGS: NOT AT ALL
SUM OF ALL RESPONSES TO PHQ QUESTIONS 1-9: 0
SUM OF ALL RESPONSES TO PHQ QUESTIONS 1-9: 0
SUM OF ALL RESPONSES TO PHQ9 QUESTIONS 1 & 2: 0
SUM OF ALL RESPONSES TO PHQ QUESTIONS 1-9: 0
2. FEELING DOWN, DEPRESSED OR HOPELESS: NOT AT ALL
SUM OF ALL RESPONSES TO PHQ QUESTIONS 1-9: 0

## 2024-06-21 NOTE — PROGRESS NOTES
[facility] DIABETES AND ENDOCRINOLOGY                 Mirta Martin MD         Patient Information     Name : Tom Landry 42 y.o.       YOB: 1980                 Chief Complaint   Patient presents with    Diabetes           History of Present Illness: Tom Landry is here  for fu of  Type 2 Diabetes Mellitus .    She was diagnosed with diabetes in February 2016.     She is off metformin in Jan 2024, discontinued trulicity due to nausea in July 2023  She has lost weight,  She is exercising and eating healthy  Blood pressure elevated, evaluated by cardiology, heart monitor, on losartan, metoprolol  Since being off spironolactone noted some hair growth   Not planning any pregnancy - tubal ligation     Highest weight 319 lbs in her 20s, last 10 years highest weight was 260 lbs ,,goal weight is 160 lbs    No hypoglycemia          Wt Readings from Last 3 Encounters:   06/21/24 78.9 kg (174 lb)   01/05/24 79.8 kg (176 lb)   10/23/23 82.6 kg (182 lb)        Past Medical History:   Diagnosis Date    Carpal tunnel syndrome     Diabetes (HCC)        Current Outpatient Medications   Medication Sig    metoprolol succinate (TOPROL XL) 25 MG extended release tablet     losartan (COZAAR) 25 MG tablet Take 1 tablet by mouth daily    Ferrous Sulfate (IRON PO) Take 1 tablet by mouth daily    Multiple Vitamin (MULTIVITAMIN ADULT PO) Take 1 tablet by mouth daily    spironolactone (ALDACTONE) 100 MG tablet TAKE ONE TABLET BY MOUTH AT BEDTIME (Patient not taking: Reported on 6/21/2024)     No current facility-administered medications for this visit.                    Physical Examination:      General: pleasant, no distress, good eye contact    HEENT: no exophthalmos, no periorbital edema, EOMI    CVS -S1-S2 regular    RS: Normal respiratory effort    Musculoskeletal: no tremors    Neurological: alert and oriented    Psychiatric: normal mood and affect    Skin: Normal color      Diabetic foot exam: Sept

## 2024-06-21 NOTE — PROGRESS NOTES
Identified pt with two pt identifiers(name and ). Reviewed record in preparation for visit and have obtained necessary documentation. All patient medications has been reviewed.  No chief complaint on file.      Vitals:    24 1406   BP: (!) 145/74   Pulse: 72   Resp: 20   Temp: 99.9 °F (37.7 °C)                   Coordination of Care Questionnaire:   1) Have you been to an emergency room, urgent care, or hospitalized since your last visit?   no       2. Have seen or consulted any other health care provider since your last visit? Yes, cardiologist        Patient is accompanied by Self I have received verbal consent from Tom Landry to discuss any/all medical information while they are present in the room.

## 2024-10-21 ENCOUNTER — APPOINTMENT (OUTPATIENT)
Facility: HOSPITAL | Age: 44
End: 2024-10-21
Payer: COMMERCIAL

## 2024-10-21 ENCOUNTER — HOSPITAL ENCOUNTER (EMERGENCY)
Facility: HOSPITAL | Age: 44
Discharge: HOME | End: 2024-10-21
Payer: COMMERCIAL

## 2024-10-21 VITALS
DIASTOLIC BLOOD PRESSURE: 67 MMHG | BODY MASS INDEX: 29.02 KG/M2 | TEMPERATURE: 98.3 F | OXYGEN SATURATION: 99 % | HEART RATE: 60 BPM | SYSTOLIC BLOOD PRESSURE: 127 MMHG | HEIGHT: 64 IN | WEIGHT: 170 LBS | RESPIRATION RATE: 16 BRPM

## 2024-10-21 DIAGNOSIS — M75.41 SUBACROMIAL IMPINGEMENT, RIGHT: ICD-10-CM

## 2024-10-21 DIAGNOSIS — M19.011 GLENOHUMERAL ARTHRITIS, RIGHT: ICD-10-CM

## 2024-10-21 DIAGNOSIS — M12.811 ROTATOR CUFF ARTHROPATHY OF RIGHT SHOULDER: Primary | ICD-10-CM

## 2024-10-21 PROCEDURE — 6360000002 HC RX W HCPCS: Performed by: PHYSICIAN ASSISTANT

## 2024-10-21 PROCEDURE — 96372 THER/PROPH/DIAG INJ SC/IM: CPT

## 2024-10-21 PROCEDURE — 73030 X-RAY EXAM OF SHOULDER: CPT

## 2024-10-21 PROCEDURE — 99284 EMERGENCY DEPT VISIT MOD MDM: CPT

## 2024-10-21 RX ORDER — KETOROLAC TROMETHAMINE 30 MG/ML
30 INJECTION, SOLUTION INTRAMUSCULAR; INTRAVENOUS
Status: COMPLETED | OUTPATIENT
Start: 2024-10-21 | End: 2024-10-21

## 2024-10-21 RX ORDER — DICLOFENAC SODIUM 75 MG/1
75 TABLET, DELAYED RELEASE ORAL 2 TIMES DAILY
Qty: 20 TABLET | Refills: 0 | Status: SHIPPED | OUTPATIENT
Start: 2024-10-21

## 2024-10-21 RX ORDER — METHOCARBAMOL 750 MG/1
750 TABLET, FILM COATED ORAL 4 TIMES DAILY
Qty: 20 TABLET | Refills: 0 | Status: SHIPPED | OUTPATIENT
Start: 2024-10-21 | End: 2024-10-26

## 2024-10-21 RX ORDER — ACETAMINOPHEN 500 MG
1000 TABLET ORAL 4 TIMES DAILY PRN
Qty: 40 TABLET | Refills: 0 | Status: SHIPPED | OUTPATIENT
Start: 2024-10-21

## 2024-10-21 RX ORDER — TRAMADOL HYDROCHLORIDE 50 MG/1
50 TABLET ORAL EVERY 6 HOURS PRN
Qty: 12 TABLET | Refills: 0 | Status: SHIPPED | OUTPATIENT
Start: 2024-10-21 | End: 2024-10-24

## 2024-10-21 RX ADMIN — KETOROLAC TROMETHAMINE 30 MG: 30 INJECTION, SOLUTION INTRAMUSCULAR; INTRAVENOUS at 16:14

## 2024-10-21 ASSESSMENT — PAIN SCALES - GENERAL: PAINLEVEL_OUTOF10: 7

## 2024-10-21 ASSESSMENT — PAIN - FUNCTIONAL ASSESSMENT: PAIN_FUNCTIONAL_ASSESSMENT: 0-10

## 2024-12-06 ENCOUNTER — LAB (OUTPATIENT)
Age: 44
End: 2024-12-06

## 2024-12-06 DIAGNOSIS — E55.9 VITAMIN D DEFICIENCY: ICD-10-CM

## 2024-12-06 DIAGNOSIS — E11.65 TYPE 2 DIABETES MELLITUS WITH HYPERGLYCEMIA, UNSPECIFIED WHETHER LONG TERM INSULIN USE (HCC): ICD-10-CM

## 2024-12-06 DIAGNOSIS — I10 ESSENTIAL HYPERTENSION: ICD-10-CM

## 2024-12-07 LAB
25(OH)D3 SERPL-MCNC: 28.7 NG/ML (ref 30–100)
ANION GAP SERPL CALC-SCNC: 3 MMOL/L (ref 2–12)
BUN SERPL-MCNC: 13 MG/DL (ref 6–20)
BUN/CREAT SERPL: 17 (ref 12–20)
CALCIUM SERPL-MCNC: 9.5 MG/DL (ref 8.5–10.1)
CHLORIDE SERPL-SCNC: 106 MMOL/L (ref 97–108)
CO2 SERPL-SCNC: 28 MMOL/L (ref 21–32)
CREAT SERPL-MCNC: 0.75 MG/DL (ref 0.55–1.02)
EST. AVERAGE GLUCOSE BLD GHB EST-MCNC: 111 MG/DL
GLUCOSE SERPL-MCNC: 77 MG/DL (ref 65–100)
HBA1C MFR BLD: 5.5 % (ref 4–5.6)
POTASSIUM SERPL-SCNC: 4.7 MMOL/L (ref 3.5–5.1)
SODIUM SERPL-SCNC: 137 MMOL/L (ref 136–145)

## 2024-12-13 ENCOUNTER — OFFICE VISIT (OUTPATIENT)
Age: 44
End: 2024-12-13
Payer: COMMERCIAL

## 2024-12-13 VITALS
OXYGEN SATURATION: 100 % | HEIGHT: 64 IN | WEIGHT: 172.6 LBS | SYSTOLIC BLOOD PRESSURE: 124 MMHG | DIASTOLIC BLOOD PRESSURE: 63 MMHG | BODY MASS INDEX: 29.47 KG/M2 | TEMPERATURE: 64 F

## 2024-12-13 DIAGNOSIS — E11.65 TYPE 2 DIABETES MELLITUS WITH HYPERGLYCEMIA, WITHOUT LONG-TERM CURRENT USE OF INSULIN (HCC): Primary | ICD-10-CM

## 2024-12-13 DIAGNOSIS — I10 ESSENTIAL HYPERTENSION: ICD-10-CM

## 2024-12-13 PROCEDURE — 3074F SYST BP LT 130 MM HG: CPT | Performed by: INTERNAL MEDICINE

## 2024-12-13 PROCEDURE — 3078F DIAST BP <80 MM HG: CPT | Performed by: INTERNAL MEDICINE

## 2024-12-13 PROCEDURE — 99214 OFFICE O/P EST MOD 30 MIN: CPT | Performed by: INTERNAL MEDICINE

## 2024-12-13 PROCEDURE — 3044F HG A1C LEVEL LT 7.0%: CPT | Performed by: INTERNAL MEDICINE

## 2024-12-13 ASSESSMENT — PATIENT HEALTH QUESTIONNAIRE - PHQ9
2. FEELING DOWN, DEPRESSED OR HOPELESS: NOT AT ALL
SUM OF ALL RESPONSES TO PHQ9 QUESTIONS 1 & 2: 0
1. LITTLE INTEREST OR PLEASURE IN DOING THINGS: NOT AT ALL
SUM OF ALL RESPONSES TO PHQ QUESTIONS 1-9: 0

## 2024-12-13 NOTE — PROGRESS NOTES
[facility] DIABETES AND ENDOCRINOLOGY                 Mirta Martin MD         Patient Information     Name : Tom Landry 42 y.o.       YOB: 1980             The patient (or guardian, if applicable) and other individuals in attendance with the patient were advised that Artificial Intelligence will be utilized during this visit to record, process the conversation to generate a clinical note, and support improvement of the AI technology. The patient (or guardian, if applicable) and other individuals in attendance at the appointment consented to the use of AI, including the recording.        Chief Complaint   Patient presents with    Diabetes           History of Present Illness: Tom Landry is here  for fu of  Type 2 Diabetes Mellitus .    She was diagnosed with diabetes in February 2016.     She is off metformin since Jan 2024, discontinued trulicity due to nausea in July 2023  She has lost weight,  She is exercising and eating healthy  Seeing cardiology for hypertrophic obstructive cardiomyopathy     Highest weight 319 lbs in her 20s, last 10 years highest weight was 260 lbs ,,goal weight is 160 lbs    No hypoglycemia          Wt Readings from Last 3 Encounters:   12/13/24 78.3 kg (172 lb 9.6 oz)   10/21/24 77.1 kg (170 lb)   06/21/24 78.9 kg (174 lb)        Past Medical History:   Diagnosis Date    Carpal tunnel syndrome     Diabetes (HCC)        Current Outpatient Medications   Medication Sig    metoprolol succinate (TOPROL XL) 25 MG extended release tablet     losartan (COZAAR) 25 MG tablet Take 1 tablet by mouth daily    Multiple Vitamin (MULTIVITAMIN ADULT PO) Take 1 tablet by mouth daily     No current facility-administered medications for this visit.                    Physical Examination:      General: pleasant, no distress, good eye contact    HEENT: no exophthalmos, no periorbital edema, EOMI    CVS -S1-S2 regular, + murmur    RS: Normal respiratory effort

## 2024-12-13 NOTE — PROGRESS NOTES
Identified pt with two pt identifiers(name and ). Reviewed record in preparation for visit and have obtained necessary documentation. All patient medications has been reviewed.  Chief Complaint   Patient presents with    Diabetes       Vitals:    24 1342   BP: 124/63   Temp: (!) 64 °F (17.8 °C)   SpO2: 100%                   Coordination of Care Questionnaire:   1) Have you been to an emergency room, urgent care, or hospitalized since your last visit?   no       2. Have seen or consulted any other health care provider since your last visit? no        Patient is accompanied by self I have received verbal consent from Tom Landry to discuss any/all medical information while they are present in the room.

## 2025-05-16 DIAGNOSIS — E11.65 TYPE 2 DIABETES MELLITUS WITH HYPERGLYCEMIA, WITHOUT LONG-TERM CURRENT USE OF INSULIN (HCC): Primary | ICD-10-CM

## 2025-05-16 RX ORDER — BLOOD SUGAR DIAGNOSTIC
STRIP MISCELLANEOUS
Qty: 50 STRIP | Refills: 11 | Status: SHIPPED | OUTPATIENT
Start: 2025-05-16